# Patient Record
Sex: MALE | Race: WHITE | NOT HISPANIC OR LATINO | Employment: FULL TIME | URBAN - NONMETROPOLITAN AREA
[De-identification: names, ages, dates, MRNs, and addresses within clinical notes are randomized per-mention and may not be internally consistent; named-entity substitution may affect disease eponyms.]

---
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
## 2018-06-28 ENCOUNTER — HOSPITAL ENCOUNTER (EMERGENCY)
Facility: HOSPITAL | Age: 18
Discharge: HOME | End: 2018-06-29
Attending: INTERNAL MEDICINE | Admitting: INTERNAL MEDICINE
Payer: OTHER MISCELLANEOUS

## 2018-06-28 VITALS
DIASTOLIC BLOOD PRESSURE: 77 MMHG | TEMPERATURE: 98.9 F | SYSTOLIC BLOOD PRESSURE: 150 MMHG | OXYGEN SATURATION: 99 % | RESPIRATION RATE: 16 BRPM

## 2018-06-28 DIAGNOSIS — S81.811A LACERATION OF RIGHT LOWER EXTREMITY, INITIAL ENCOUNTER: ICD-10-CM

## 2018-06-28 PROCEDURE — 12001 RPR S/N/AX/GEN/TRNK 2.5CM/<: CPT

## 2018-06-28 PROCEDURE — 99282 EMERGENCY DEPT VISIT SF MDM: CPT | Mod: 25

## 2018-06-28 PROCEDURE — 12001 RPR S/N/AX/GEN/TRNK 2.5CM/<: CPT | Performed by: INTERNAL MEDICINE

## 2018-06-28 NOTE — ED AVS SNAPSHOT
[facility]    [address] [place] [address]    [place] [address]    Phone:  [phone]                                       Hema Rico   MRN: [id number]    Department:  [facility]   Date of Visit:  6/28/2018           Patient Information     Date Of Birth          2000        Your diagnoses for this visit were:     Laceration of right lower extremity, initial encounter        You were seen by Chauncey Lee MD.      Follow-up Information     Schedule an appointment as soon as possible for a visit with Pema Allison PA.    Specialty:  Family Practice    Contact information:    [address] [address]  [place] [address]  [phone]          Discharge Instructions          * LACERATION (All Closures)  A laceration is a cut through the skin. This will usually require stitches (sutures) or staples if it is deep. Minor cuts may be treated with a tape closure ( Steri-Strips ) or Dermabond skin glue.       HOME CARE:  PAIN MEDICINE: You may use acetaminophen (Tylenol) 650-1000 mg every 6 hours or ibuprofen (Motrin, Advil) 600 mg every 6-8 hours with food to control pain, if you are able to take these medicines. [NOTE: If you have chronic liver or kidney disease or ever had a stomach ulcer or GI bleeding, talk with your doctor before using these medicines.]  EXTREMITY, FACE or TRUNK WOUNDS:    Keep the wound clean and dry. If a bandage was applied and it becomes wet or dirty, replace it. Otherwise, leave it in place for the first 24 hours.    If stitches or staples were used, clean the wound daily. Protect the wound from sunlight and tanning lamps.    After removing the bandage, wash the area with soap and water. Use a wet cotton swab (Q tip) to loosen and remove any blood or crust that forms.    After cleaning, apply a thin layer of Polysporin or Bacitracin ointment. This will keep the wound clean and make it easier to remove the stitches or staples. Reapply a fresh bandage.    You  may remove the bandage to shower as usual after the first 24 hours, but do not soak the area in water (no swimming) until the stitches or staples are removed.    If Steri-Strips were used, keep the area clean and dry. If it becomes wet, blot it dry with a towel. It is okay to take a brief shower, but avoid scrubbing the area.    If Dermabond skin adhesive was used, do not scratch, rub or pick at the adhesive film. Do not place tape directly over the film. Do not apply liquid, ointment or creams to the wound while the film is in place. Do not clean the wound with peroxide and do not apply ointments. Avoid activities that cause heavy sweating until the film has fallen off. Protect the wound from prolonged exposure to sunlight or tanning lamps. You may shower as usual but do not soak the wound in water (no baths or swimming). The film will fall off by itself in 5-10 days.  SCALP WOUNDS: During the first two days, you may carefully rinse your hair in the shower to remove blood, glass or dirt particles. After two days, you may shower and shampoo your hair normally. Do not soak your scalp in the tub or go swimming until the stitches or staples have been removed.  MOUTH WOUNDS: Eat soft foods to reduce pain. If the cut is inside of your mouth, clean by rinsing after each meal and at bedtime with a mixture of equal parts water and Hydrogen Peroxide (do not swallow!). Or, you can use a cotton swab to directly apply Hydrogen Peroxide onto the cut.  After the wound is done healing, use sunscreen over the area whenever exposed for the next 6 minths to avoid a darker scar.     FOLLOW UP: Most skin wounds heal within ten days. Mouth and facial wounds heal within five days. However, even with proper treatment, a wound infection may sometimes occur. Therefore, you should check the wound daily for signs of infection listed below.  Stitches should be removed from the face within five days; stitches and staples should be removed from  other parts of the body within 7-10 days. Unless you are told to come back to the emergency room, you may have your doctor or urgent care remove the stitches. If dissolving stitches were used in the mouth, these will fall out or dissolve without the need for removal. If tape closures ( Steri-Strips ) were used, remove them yourself if they have not fallen off after 7 days. If Dermabond skin glue was used, the film will fall off by itself in 5-10 days.      GET PROMPT MEDICAL ATTENTION  if any of the following occur:    Increasing pain in the wound    Redness, swelling or pus coming from the wound    Fever over 101 F (38.3 C) oral    If stitches or staples come apart or fall out or if Steri-Strips fall off before seven days    If the wound edges re-open    Bleeding not controlled by direct pressure    2092-4993 [employer]. [address] [place] [address]. All rights reserved. This information is not intended as a substitute for professional medical care. Always follow your healthcare professional's instructions.  This information has been modified by your health care provider with permission from the publisher.         Review of your medicines      Notice     You have not been prescribed any medications.            Orders Needing Specimen Collection     None      Pending Results     No orders found for last 3 day(s).            Pending Culture Results     No orders found for last 3 day(s).            Thank you for choosing [facility]       Thank you for choosing [facility] for your care. Our goal is always to provide you with excellent care. Hearing back from our patients is one way we can continue to improve our services. Please take a few minutes to complete the written survey that you may receive in the mail after you visit with us. Thank you!        [employer] Information     [employer] lets you send messages to your doctor, view your test results, renew your prescriptions, schedule appointments  "and more. To sign up, go to [email or web address] . Click on \"Log in\" on the left side of the screen, which will take you to the Welcome page. Then click on \"Sign up Now\" on the right side of the page.     You will be asked to enter the access code listed below, as well as some personal information. Please follow the directions to create your username and password.     Your access code is: [email or web address]  Expires: 2018 12:29 AM     Your access code will  in 90 days. If you need help or a new code, please call your [facility] clinic or [phone].        Care EveryWhere ID     This is your Care EveryWhere ID. This could be used by other organizations to access your [facility] medical records  [id number]        Equal Access to Services     KYLE GÓMEZ : Makayla Greene, ursula holliday, olga diane, alberta story. So [facility] [phone].    ATENCIÓN: Si habla español, tiene a lindsey disposición servicios gratuitos de asistencia lingüística. Llame al [phone].    We comply with applicable federal civil rights laws and [place] laws. We do not discriminate on the basis of race, color, national origin, age, disability, sex, sexual orientation, or gender identity.            After Visit Summary       This is your record. Keep this with you and show to your community pharmacist(s) and doctor(s) at your next visit.                  "

## 2018-06-28 NOTE — ED AVS SNAPSHOT
[facility]    [address]    [place] [address]    Phone:  [phone]                                       Hema Rico   MRN: [id number]    Department:  [facility]   Date of Visit:  6/28/2018           After Visit Summary Signature Page     I have received my discharge instructions, and my questions have been answered. I have discussed any challenges I see with this plan with the nurse or doctor.    ..........................................................................................................................................  Patient/Patient Representative Signature      ..........................................................................................................................................  Patient Representative Print Name and Relationship to Patient    ..................................................               ................................................  Date                                            Time    ..........................................................................................................................................  Reviewed by Signature/Title    ...................................................              ..............................................  Date                                                            Time

## 2018-06-29 ASSESSMENT — ENCOUNTER SYMPTOMS
ABDOMINAL PAIN: 0
FOCAL WEAKNESS: 0
SHORTNESS OF BREATH: 0
EXTREMITY NUMBNESS: 0
FEVER: 0

## 2018-06-29 NOTE — ED NOTES
Ambulated to room 3 independently, call light in reach.  Around 2330 was cutting a zip tie with a razor blade cutter at work.  It slipped and cut right thigh.  2.5 cm laceration right anterior medial thigh, bleeding controlled.  Wound flushed with NS.  Denies pain at this time.   Tdap - 8/11/11.

## 2018-06-29 NOTE — DISCHARGE INSTRUCTIONS
* LACERATION (All Closures)  A laceration is a cut through the skin. This will usually require stitches (sutures) or staples if it is deep. Minor cuts may be treated with a tape closure ( Steri-Strips ) or Dermabond skin glue.       HOME CARE:  PAIN MEDICINE: You may use acetaminophen (Tylenol) 650-1000 mg every 6 hours or ibuprofen (Motrin, Advil) 600 mg every 6-8 hours with food to control pain, if you are able to take these medicines. [NOTE: If you have chronic liver or kidney disease or ever had a stomach ulcer or GI bleeding, talk with your doctor before using these medicines.]  EXTREMITY, FACE or TRUNK WOUNDS:    Keep the wound clean and dry. If a bandage was applied and it becomes wet or dirty, replace it. Otherwise, leave it in place for the first 24 hours.    If stitches or staples were used, clean the wound daily. Protect the wound from sunlight and tanning lamps.    After removing the bandage, wash the area with soap and water. Use a wet cotton swab (Q tip) to loosen and remove any blood or crust that forms.    After cleaning, apply a thin layer of Polysporin or Bacitracin ointment. This will keep the wound clean and make it easier to remove the stitches or staples. Reapply a fresh bandage.    You may remove the bandage to shower as usual after the first 24 hours, but do not soak the area in water (no swimming) until the stitches or staples are removed.    If Steri-Strips were used, keep the area clean and dry. If it becomes wet, blot it dry with a towel. It is okay to take a brief shower, but avoid scrubbing the area.    If Dermabond skin adhesive was used, do not scratch, rub or pick at the adhesive film. Do not place tape directly over the film. Do not apply liquid, ointment or creams to the wound while the film is in place. Do not clean the wound with peroxide and do not apply ointments. Avoid activities that cause heavy sweating until the film has fallen off. Protect the wound from prolonged  exposure to sunlight or tanning lamps. You may shower as usual but do not soak the wound in water (no baths or swimming). The film will fall off by itself in 5-10 days.  SCALP WOUNDS: During the first two days, you may carefully rinse your hair in the shower to remove blood, glass or dirt particles. After two days, you may shower and shampoo your hair normally. Do not soak your scalp in the tub or go swimming until the stitches or staples have been removed.  MOUTH WOUNDS: Eat soft foods to reduce pain. If the cut is inside of your mouth, clean by rinsing after each meal and at bedtime with a mixture of equal parts water and Hydrogen Peroxide (do not swallow!). Or, you can use a cotton swab to directly apply Hydrogen Peroxide onto the cut.  After the wound is done healing, use sunscreen over the area whenever exposed for the next 6 minths to avoid a darker scar.     FOLLOW UP: Most skin wounds heal within ten days. Mouth and facial wounds heal within five days. However, even with proper treatment, a wound infection may sometimes occur. Therefore, you should check the wound daily for signs of infection listed below.  Stitches should be removed from the face within five days; stitches and staples should be removed from other parts of the body within 7-10 days. Unless you are told to come back to the emergency room, you may have your doctor or urgent care remove the stitches. If dissolving stitches were used in the mouth, these will fall out or dissolve without the need for removal. If tape closures ( Steri-Strips ) were used, remove them yourself if they have not fallen off after 7 days. If Dermabond skin glue was used, the film will fall off by itself in 5-10 days.      GET PROMPT MEDICAL ATTENTION  if any of the following occur:    Increasing pain in the wound    Redness, swelling or pus coming from the wound    Fever over 101 F (38.3 C) oral    If stitches or staples come apart or fall out or if Steri-Strips fall  off before seven days    If the wound edges re-open    Bleeding not controlled by direct pressure    2851-9086 [employer]. [address] [place] [address], [place] [address]. All rights reserved. This information is not intended as a substitute for professional medical care. Always follow your healthcare professional's instructions.  This information has been modified by your health care provider with permission from the publisher.

## 2018-06-30 NOTE — ED PROVIDER NOTES
History     Chief Complaint   Patient presents with     Laceration     about 2330 was cutting a zip tie with a razor blade cutter, it slipped and cut right thigh, bleeding controlled     Patient is a 18 year old male presenting with skin laceration. The history is provided by the patient.   Laceration   Location:  Leg  Leg laceration location:  R leg  Depth:  Through dermis  Laceration mechanism:  Razor  Pain details:     Severity:  Mild  Foreign body present:  No foreign bodies  Tetanus status:  Up to date  Associated symptoms: no fever, no focal weakness, no numbness and no swelling          Problem List:    Patient Active Problem List    Diagnosis Date Noted     NO ACTIVE PROBLEMS 03/24/2016     Priority: Medium        Past Medical History:    History reviewed. No pertinent past medical history.    Past Surgical History:    History reviewed. No pertinent surgical history.    Family History:    Family History   Problem Relation Age of Onset     Asthma Mother      Psychotic Disorder Father      panic attacks     Hypertension Father      Family History Negative Maternal Grandmother      Family History Negative Brother      Hypertension Maternal Grandfather      C.A.D. Maternal Grandfather      Family History Negative Paternal Grandmother      Family History Negative Paternal Grandfather      GASTROINTESTINAL DISEASE Maternal Aunt      crohns     HEART DISEASE Other      family history Afib     HEART DISEASE Other      family history mitral valve     Thyroid Disease No family hx of        Social History:  Marital Status:  Single [1]  Social History   Substance Use Topics     Smoking status: Current Every Day Smoker     Types: Dip, chew, snus or snuff     Smokeless tobacco: Never Used      Comment: e cigarettes     Alcohol use No        Medications:      No current outpatient prescriptions on file.      Review of Systems   Constitutional: Negative for fever.   Respiratory: Negative for shortness of breath.     Cardiovascular: Negative for chest pain.   Gastrointestinal: Negative for abdominal pain.   Neurological: Negative for focal weakness.   All other systems reviewed and are negative.      Physical Exam   BP: 150/77  Heart Rate: 79  Temp: 98.9  F (37.2  C)  Resp: 16  SpO2: 99 %      Physical Exam   Constitutional: No distress.   HENT:   Head: Atraumatic.   Mouth/Throat: Oropharynx is clear and moist. No oropharyngeal exudate.   Eyes: Pupils are equal, round, and reactive to light. No scleral icterus.   Cardiovascular: Normal heart sounds and intact distal pulses.    Pulmonary/Chest: Breath sounds normal. No respiratory distress.   Abdominal: Soft. Bowel sounds are normal. There is no tenderness.   Musculoskeletal: He exhibits no edema or tenderness.   Skin: Skin is warm. No rash noted. He is not diaphoretic.        About 20 x 5 mm laceration on anteromedial of mid thigh       ED Course     ED Course     Laceration repair  Date/Time: 6/29/2018 8:41 PM  Performed by: EMERSON MORRISSEY  Authorized by: EMERSON MORRISSEY   Consent: Verbal consent obtained.  Risks and benefits: risks, benefits and alternatives were discussed  Consent given by: patient  Patient understanding: patient states understanding of the procedure being performed  Patient identity confirmed: verbally with patient  Body area: lower extremity  Location details: right upper leg  Laceration length: 2 cm  Foreign bodies: no foreign bodies  Tendon involvement: none  Nerve involvement: none  Anesthesia: local infiltration    Anesthesia:  Local Anesthetic: lidocaine 2% without epinephrine  Anesthetic total: 2 mL  Irrigation solution: saline  Irrigation method: syringe  Amount of cleaning: standard  Skin closure: 3-0 nylon  Technique: simple  Approximation: close  Approximation difficulty: simple  Patient tolerance: Patient tolerated the procedure well with no immediate complications                         No results found for this or any previous visit (from the past  24 hour(s)).    Medications - No data to display    Assessments & Plan (with Medical Decision Making)   R upper leg laceration with razor   Repaired  Dc home, fu with PCP  I have reviewed the nursing notes.    I have reviewed the findings, diagnosis, plan and need for follow up with the patient.      There are no discharge medications for this patient.      Final diagnoses:   Laceration of right lower extremity, initial encounter       6/28/2018   [facility]     Chauncey Lee MD  06/29/18 9964

## 2018-07-31 NOTE — PROGRESS NOTES
"  SUBJECTIVE:   Hema Rico is a 18 year old male who presents to clinic today for the following health issues:    Abnormal Mood Symptoms      Duration: 1 year - has  Gotten drastically worse    Description:  Depression: no   Anxiety: YES  Panic attacks: YES on Saturday, has had a headache/migraine since saturday    Accompanying signs and symptoms: see PHQ-9 and JENAE scores    History (similar episodes/previous evaluation): None    Precipitating or alleviating factors: None    Therapies tried and outcome: none    Pt is a 17 yo M who presents to clinic with anxiety symptoms. States he has been struggling with anxiety for the last year or so. He has difficulty falling asleep because he mind is racing. He also notes his mind seems scattered. It is hard for him to focus. He is forgetful and irritable. He had what he feels is a panic attack several days ago around 2 AM. Felt overwhelmed, palpitations, short of breath, sweating. He was able to get up and walk it off. Lasted about 30 minutes. No weight loss. No change in appetite. Is not taking any drugs. Is working on quitting tobacco. He denies etoh or other drug use.     Of note, patient was worked up from a cardiac standpoint in 2014 for palpitations. Normal echo and normal holter monitor.     Problem list and histories reviewed & adjusted, as indicated.  Additional history: as documented    Labs reviewed in EPIC    Reviewed and updated as needed this visit by clinical staff  Tobacco  Allergies  Meds  Problems  Med Hx  Surg Hx  Fam Hx  Soc Hx        Reviewed and updated as needed this visit by Provider  Tobacco  Med Hx  Surg Hx  Fam Hx  Soc Hx        ROS:  Constitutional, HEENT, cardiovascular, pulmonary, gi and gu systems are negative, except as otherwise noted.    OBJECTIVE:     /78  Pulse 66  Temp 98.6  F (37  C) (Tympanic)  Resp 16  Ht 6' 3.5\" (1.918 m)  Wt 200 lb (90.7 kg)  SpO2 98%  BMI 24.67 kg/m2  Body mass index is 24.67 " kg/(m^2).  GENERAL: healthy, alert and no distress  NECK: no adenopathy, no asymmetry, masses, or scars and thyroid normal to palpation  RESP: lungs clear to auscultation - no rales, rhonchi or wheezes  CV: regular rate and rhythm, normal S1 S2, no S3 or S4, no murmur, click or rub, no peripheral edema and peripheral pulses strong  ABDOMEN: soft, nontender, no hepatosplenomegaly, no masses and bowel sounds normal  MS: no gross musculoskeletal defects noted, no edema    Diagnostic Test Results:  Results for orders placed or performed in visit on 08/01/18   CBC with platelets and differential   Result Value Ref Range    WBC 6.9 4.0 - 11.0 10e9/L    RBC Count 4.88 4.4 - 5.9 10e12/L    Hemoglobin 15.0 13.3 - 17.7 g/dL    Hematocrit 42.3 40.0 - 53.0 %    MCV 87 78 - 100 fl    MCH 30.7 26.5 - 33.0 pg    MCHC 35.5 31.5 - 36.5 g/dL    RDW 12.1 10.0 - 15.0 %    Platelet Count 191 150 - 450 10e9/L    Diff Method Automated Method     % Neutrophils 57.2 %    % Lymphocytes 32.5 %    % Monocytes 6.2 %    % Eosinophils 3.3 %    % Basophils 0.7 %    % Immature Granulocytes 0.1 %    Nucleated RBCs 0 0 /100    Absolute Neutrophil 4.0 1.6 - 8.3 10e9/L    Absolute Lymphocytes 2.3 0.8 - 5.3 10e9/L    Absolute Monocytes 0.4 0.0 - 1.3 10e9/L    Absolute Eosinophils 0.2 0.0 - 0.7 10e9/L    Absolute Basophils 0.1 0.0 - 0.2 10e9/L    Abs Immature Granulocytes 0.0 0 - 0.4 10e9/L    Absolute Nucleated RBC 0.0    Comprehensive metabolic panel (BMP + Alb, Alk Phos, ALT, AST, Total. Bili, TP)   Result Value Ref Range    Sodium 140 133 - 144 mmol/L    Potassium 3.7 3.4 - 5.3 mmol/L    Chloride 106 94 - 109 mmol/L    Carbon Dioxide 28 20 - 32 mmol/L    Anion Gap 6 3 - 14 mmol/L    Glucose 89 70 - 99 mg/dL    Urea Nitrogen 12 7 - 30 mg/dL    Creatinine 0.63 (L) 0.66 - 1.25 mg/dL    GFR Estimate >90 >60 mL/min/1.7m2    GFR Estimate If Black >90 >60 mL/min/1.7m2    Calcium 8.4 (L) 8.5 - 10.1 mg/dL    Bilirubin Total 1.2 0.2 - 1.3 mg/dL    Albumin 4.5  3.4 - 5.0 g/dL    Protein Total 7.6 6.8 - 8.8 g/dL    Alkaline Phosphatase 108 40 - 150 U/L    ALT 32 0 - 70 U/L    AST 16 0 - 45 U/L   TSH with free T4 reflex   Result Value Ref Range    TSH 2.59 0.40 - 4.00 mU/L       ASSESSMENT/PLAN:     JENAE (generalized anxiety disorder)   Discussed options with patient. He would like to start medication. Is ambivalent about what to start. Discussed SSRIs vs SNRI like wellbutrin as he is currently using tobacco. He believes his mom or brother may have been on this medication. Start at one tab a day. Follow up 1 month. If side effects of medication, he should call rather than wait for appt and we can start something else if desired over the phone.   - CBC with platelets and differential  - Comprehensive metabolic panel (BMP + Alb, Alk Phos, ALT, AST, Total. Bili, TP)  - TSH with free T4 reflex  - Vitamin D Deficiency  - buPROPion (WELLBUTRIN XL) 150 MG 24 hr tablet; Take 1 tablet (150 mg) by mouth every morning  Dispense: 30 tablet; Refill: 1    Sushila Anderson MD  [facility]

## 2018-08-01 ENCOUNTER — TELEPHONE (OUTPATIENT)
Dept: FAMILY MEDICINE | Facility: OTHER | Age: 18
End: 2018-08-01

## 2018-08-01 ENCOUNTER — OFFICE VISIT (OUTPATIENT)
Dept: FAMILY MEDICINE | Facility: OTHER | Age: 18
End: 2018-08-01
Attending: FAMILY MEDICINE
Payer: COMMERCIAL

## 2018-08-01 VITALS
OXYGEN SATURATION: 98 % | HEART RATE: 66 BPM | DIASTOLIC BLOOD PRESSURE: 78 MMHG | BODY MASS INDEX: 24.36 KG/M2 | SYSTOLIC BLOOD PRESSURE: 128 MMHG | HEIGHT: 76 IN | TEMPERATURE: 98.6 F | RESPIRATION RATE: 16 BRPM | WEIGHT: 200 LBS

## 2018-08-01 DIAGNOSIS — Z72.0 TOBACCO ABUSE: ICD-10-CM

## 2018-08-01 DIAGNOSIS — F41.1 GAD (GENERALIZED ANXIETY DISORDER): Primary | ICD-10-CM

## 2018-08-01 LAB
ALBUMIN SERPL-MCNC: 4.5 G/DL (ref 3.4–5)
ALP SERPL-CCNC: 108 U/L (ref 40–150)
ALT SERPL W P-5'-P-CCNC: 32 U/L (ref 0–70)
ANION GAP SERPL CALCULATED.3IONS-SCNC: 6 MMOL/L (ref 3–14)
AST SERPL W P-5'-P-CCNC: 16 U/L (ref 0–45)
BASOPHILS # BLD AUTO: 0.1 10E9/L (ref 0–0.2)
BASOPHILS NFR BLD AUTO: 0.7 %
BILIRUB SERPL-MCNC: 1.2 MG/DL (ref 0.2–1.3)
BUN SERPL-MCNC: 12 MG/DL (ref 7–30)
CALCIUM SERPL-MCNC: 8.4 MG/DL (ref 8.5–10.1)
CHLORIDE SERPL-SCNC: 106 MMOL/L (ref 94–109)
CO2 SERPL-SCNC: 28 MMOL/L (ref 20–32)
CREAT SERPL-MCNC: 0.63 MG/DL (ref 0.66–1.25)
DIFFERENTIAL METHOD BLD: NORMAL
EOSINOPHIL # BLD AUTO: 0.2 10E9/L (ref 0–0.7)
EOSINOPHIL NFR BLD AUTO: 3.3 %
ERYTHROCYTE [DISTWIDTH] IN BLOOD BY AUTOMATED COUNT: 12.1 % (ref 10–15)
GFR SERPL CREATININE-BSD FRML MDRD: >90 ML/MIN/1.7M2
GLUCOSE SERPL-MCNC: 89 MG/DL (ref 70–99)
HCT VFR BLD AUTO: 42.3 % (ref 40–53)
HGB BLD-MCNC: 15 G/DL (ref 13.3–17.7)
IMM GRANULOCYTES # BLD: 0 10E9/L (ref 0–0.4)
IMM GRANULOCYTES NFR BLD: 0.1 %
LYMPHOCYTES # BLD AUTO: 2.3 10E9/L (ref 0.8–5.3)
LYMPHOCYTES NFR BLD AUTO: 32.5 %
MCH RBC QN AUTO: 30.7 PG (ref 26.5–33)
MCHC RBC AUTO-ENTMCNC: 35.5 G/DL (ref 31.5–36.5)
MCV RBC AUTO: 87 FL (ref 78–100)
MONOCYTES # BLD AUTO: 0.4 10E9/L (ref 0–1.3)
MONOCYTES NFR BLD AUTO: 6.2 %
NEUTROPHILS # BLD AUTO: 4 10E9/L (ref 1.6–8.3)
NEUTROPHILS NFR BLD AUTO: 57.2 %
NRBC # BLD AUTO: 0 10*3/UL
NRBC BLD AUTO-RTO: 0 /100
PLATELET # BLD AUTO: 191 10E9/L (ref 150–450)
POTASSIUM SERPL-SCNC: 3.7 MMOL/L (ref 3.4–5.3)
PROT SERPL-MCNC: 7.6 G/DL (ref 6.8–8.8)
RBC # BLD AUTO: 4.88 10E12/L (ref 4.4–5.9)
SODIUM SERPL-SCNC: 140 MMOL/L (ref 133–144)
TSH SERPL DL<=0.005 MIU/L-ACNC: 2.59 MU/L (ref 0.4–4)
WBC # BLD AUTO: 6.9 10E9/L (ref 4–11)

## 2018-08-01 PROCEDURE — 99213 OFFICE O/P EST LOW 20 MIN: CPT | Performed by: FAMILY MEDICINE

## 2018-08-01 PROCEDURE — 82306 VITAMIN D 25 HYDROXY: CPT | Mod: 90 | Performed by: FAMILY MEDICINE

## 2018-08-01 PROCEDURE — 36415 COLL VENOUS BLD VENIPUNCTURE: CPT | Performed by: FAMILY MEDICINE

## 2018-08-01 PROCEDURE — 99000 SPECIMEN HANDLING OFFICE-LAB: CPT | Performed by: FAMILY MEDICINE

## 2018-08-01 PROCEDURE — 80050 GENERAL HEALTH PANEL: CPT | Performed by: FAMILY MEDICINE

## 2018-08-01 RX ORDER — BUPROPION HYDROCHLORIDE 150 MG/1
150 TABLET ORAL EVERY MORNING
Qty: 30 TABLET | Refills: 1 | Status: SHIPPED | OUTPATIENT
Start: 2018-08-01 | End: 2018-11-01

## 2018-08-01 ASSESSMENT — ANXIETY QUESTIONNAIRES
2. NOT BEING ABLE TO STOP OR CONTROL WORRYING: NEARLY EVERY DAY
3. WORRYING TOO MUCH ABOUT DIFFERENT THINGS: NEARLY EVERY DAY
6. BECOMING EASILY ANNOYED OR IRRITABLE: NOT AT ALL
7. FEELING AFRAID AS IF SOMETHING AWFUL MIGHT HAPPEN: MORE THAN HALF THE DAYS
IF YOU CHECKED OFF ANY PROBLEMS ON THIS QUESTIONNAIRE, HOW DIFFICULT HAVE THESE PROBLEMS MADE IT FOR YOU TO DO YOUR WORK, TAKE CARE OF THINGS AT HOME, OR GET ALONG WITH OTHER PEOPLE: SOMEWHAT DIFFICULT
1. FEELING NERVOUS, ANXIOUS, OR ON EDGE: MORE THAN HALF THE DAYS
GAD7 TOTAL SCORE: 12
4. TROUBLE RELAXING: MORE THAN HALF THE DAYS
5. BEING SO RESTLESS THAT IT IS HARD TO SIT STILL: NOT AT ALL

## 2018-08-01 ASSESSMENT — PAIN SCALES - GENERAL: PAINLEVEL: NO PAIN (0)

## 2018-08-01 NOTE — TELEPHONE ENCOUNTER
To: Nurse of Dr. Anderson  Please call mom of patient back to discuss the medication you put him on today.  She does not agree with the medication and would like to speak with the doctor.  Her phone # [phone].  Thank you

## 2018-08-01 NOTE — NURSING NOTE
"Chief Complaint   Patient presents with     Anxiety       Initial /78  Pulse 66  Temp 98.6  F (37  C) (Tympanic)  Resp 16  Ht 6' 3.5\" (1.918 m)  Wt 200 lb (90.7 kg)  SpO2 98%  BMI 24.67 kg/m2 Estimated body mass index is 24.67 kg/(m^2) as calculated from the following:    Height as of this encounter: 6' 3.5\" (1.918 m).    Weight as of this encounter: 200 lb (90.7 kg).  Medication Reconciliation: complete    Sabiha Peña LPN  "

## 2018-08-01 NOTE — MR AVS SNAPSHOT
"              After Visit Summary   2018    Hema Rico    MRN: [id number]           Patient Information     Date Of Birth          2000        Visit Information        Provider Department      2018 11:30 AM Sushila Anderson MD [facility]        Today's Diagnoses     JENAE (generalized anxiety disorder)    -  1    Tobacco abuse           Follow-ups after your visit        Who to contact     If you have questions or need follow up information about today's clinic visit or your schedule please contact [facility] directly at [phone].  Normal or non-critical lab and imaging results will be communicated to you by Ravel Lawhart, letter or phone within 4 business days after the clinic has received the results. If you do not hear from us within 7 days, please contact the clinic through Ravel Lawhart or phone. If you have a critical or abnormal lab result, we will notify you by phone as soon as possible.  Submit refill requests through [employer] or call your pharmacy and they will forward the refill request to us. Please allow 3 business days for your refill to be completed.          Additional Information About Your Visit        MyChart Information     [employer] lets you send messages to your doctor, view your test results, renew your prescriptions, schedule appointments and more. To sign up, go to [email or web address] . Click on \"Log in\" on the left side of the screen, which will take you to the Welcome page. Then click on \"Sign up Now\" on the right side of the page.     You will be asked to enter the access code listed below, as well as some personal information. Please follow the directions to create your username and password.     Your access code is: [email or web address]  Expires: 2018 12:29 AM     Your access code will  in 90 days. If you need help or a new code, please call your [facility] or [phone].        Care EveryWhere ID     This is your Care EveryWhere ID. This " "could be used by other organizations to access your [facility] medical records  [id number]        Your Vitals Were     Pulse Temperature Respirations Height Pulse Oximetry BMI (Body Mass Index)    66 98.6  F (37  C) (Tympanic) 16 6' 3.5\" (1.918 m) 98% 24.67 kg/m2       Blood Pressure from Last 3 Encounters:   08/01/18 128/78   06/28/18 150/77   04/14/17 122/66    Weight from Last 3 Encounters:   08/01/18 200 lb (90.7 kg)   04/14/17 194 lb (88 kg) (91 %)*   03/24/16 180 lb (81.6 kg) (86 %)*     * Growth percentiles are based on CDC 2-20 Years data.              We Performed the Following     CBC with platelets and differential     Comprehensive metabolic panel (BMP + Alb, Alk Phos, ALT, AST, Total. Bili, TP)     TSH with free T4 reflex     Vitamin D Deficiency          Today's Medication Changes          These changes are accurate as of 8/1/18 11:59 PM.  If you have any questions, ask your nurse or doctor.               Start taking these medicines.        Dose/Directions    buPROPion 150 MG 24 hr tablet   Commonly known as:  WELLBUTRIN XL   Used for:  JENAE (generalized anxiety disorder), Tobacco abuse   Started by:  Sushila Anderson MD        Dose:  150 mg   Take 1 tablet (150 mg) by mouth every morning   Quantity:  30 tablet   Refills:  1            Where to get your medicines      These medications were sent to [id number] - [place], [place] - [address] AT [facility] [address], [facility] [address]     Phone:  [phone]     buPROPion 150 MG 24 hr tablet                Primary Care Provider Office Phone # Fax #    FIDENCIO Reyes [phone]       [address] [place] [address]        Equal Access to Services     KYLE GÓMEZ AH: Makayla phillips [place], waaxda luqadaha, qaybta kaalmada adequiqueyacarolin, alberta story. So [facility] [phone].    ATENCIÓN: Si habla español, tiene a lindsey disposición servicios gratuitos de " asistencia lingüística. Edwina al [phone].    We comply with applicable federal civil rights laws and [place] laws. We do not discriminate on the basis of race, color, national origin, age, disability, sex, sexual orientation, or gender identity.            Thank you!     Thank you for choosing [facility]  for your care. Our goal is always to provide you with excellent care. Hearing back from our patients is one way we can continue to improve our services. Please take a few minutes to complete the written survey that you may receive in the mail after your visit with us. Thank you!             Your Updated Medication List - Protect others around you: Learn how to safely use, store and throw away your medicines at www.disposemymeds.org.          This list is accurate as of 8/1/18 11:59 PM.  Always use your most recent med list.                   Brand Name Dispense Instructions for use Diagnosis    buPROPion 150 MG 24 hr tablet    WELLBUTRIN XL    30 tablet    Take 1 tablet (150 mg) by mouth every morning    JENAE (generalized anxiety disorder), Tobacco abuse

## 2018-08-02 ASSESSMENT — PATIENT HEALTH QUESTIONNAIRE - PHQ9: SUM OF ALL RESPONSES TO PHQ QUESTIONS 1-9: 8

## 2018-08-02 ASSESSMENT — ANXIETY QUESTIONNAIRES: GAD7 TOTAL SCORE: 12

## 2018-08-03 LAB — DEPRECATED CALCIDIOL+CALCIFEROL SERPL-MC: 26 UG/L (ref 20–75)

## 2018-10-09 ENCOUNTER — TELEPHONE (OUTPATIENT)
Dept: FAMILY MEDICINE | Facility: OTHER | Age: 18
End: 2018-10-09

## 2018-10-09 NOTE — TELEPHONE ENCOUNTER
1:06 PM    Reason for Call: OVERBOOK    Patient is having the following symptoms: chronic headaches for 2 weeks.    The patient is requesting an appointment for 10/15/2018 with Pema Allison[place] he has home.    Was an appointment offered for this call? No  If yes : Appointment type              Date    Preferred method for responding to this message: Telephone Call  What is your phone number ? [phone]  If we cannot reach you directly, may we leave a detailed response at the number you provided? Yes    Can this message wait until your PCP/provider returns, if unavailable today? Not applicable, provider is in today.     thank you,     Judith Patricia

## 2018-11-01 ENCOUNTER — OFFICE VISIT (OUTPATIENT)
Dept: PEDIATRICS | Facility: OTHER | Age: 18
End: 2018-11-01
Attending: NURSE PRACTITIONER
Payer: COMMERCIAL

## 2018-11-01 VITALS
HEIGHT: 75 IN | SYSTOLIC BLOOD PRESSURE: 128 MMHG | TEMPERATURE: 97.2 F | OXYGEN SATURATION: 98 % | RESPIRATION RATE: 18 BRPM | BODY MASS INDEX: 23.62 KG/M2 | WEIGHT: 190 LBS | DIASTOLIC BLOOD PRESSURE: 74 MMHG | HEART RATE: 70 BPM

## 2018-11-01 DIAGNOSIS — G44.229 CHRONIC TENSION-TYPE HEADACHE, NOT INTRACTABLE: Primary | ICD-10-CM

## 2018-11-01 PROCEDURE — 99213 OFFICE O/P EST LOW 20 MIN: CPT | Performed by: NURSE PRACTITIONER

## 2018-11-01 ASSESSMENT — ANXIETY QUESTIONNAIRES
IF YOU CHECKED OFF ANY PROBLEMS ON THIS QUESTIONNAIRE, HOW DIFFICULT HAVE THESE PROBLEMS MADE IT FOR YOU TO DO YOUR WORK, TAKE CARE OF THINGS AT HOME, OR GET ALONG WITH OTHER PEOPLE: NOT DIFFICULT AT ALL
5. BEING SO RESTLESS THAT IT IS HARD TO SIT STILL: NOT AT ALL
6. BECOMING EASILY ANNOYED OR IRRITABLE: NOT AT ALL
3. WORRYING TOO MUCH ABOUT DIFFERENT THINGS: MORE THAN HALF THE DAYS
7. FEELING AFRAID AS IF SOMETHING AWFUL MIGHT HAPPEN: SEVERAL DAYS
1. FEELING NERVOUS, ANXIOUS, OR ON EDGE: MORE THAN HALF THE DAYS
2. NOT BEING ABLE TO STOP OR CONTROL WORRYING: SEVERAL DAYS
4. TROUBLE RELAXING: MORE THAN HALF THE DAYS
GAD7 TOTAL SCORE: 8

## 2018-11-01 ASSESSMENT — PATIENT HEALTH QUESTIONNAIRE - PHQ9: SUM OF ALL RESPONSES TO PHQ QUESTIONS 1-9: 3

## 2018-11-01 ASSESSMENT — PAIN SCALES - GENERAL: PAINLEVEL: MILD PAIN (2)

## 2018-11-01 NOTE — PATIENT INSTRUCTIONS
May try ice alternating with heat to the back of the neck.  May try massage.   Peppermint and/or lavender essential oils may help with pain and sleep.  May continue ibuprofen 800 mg every 8 hours as needed.  Continue chiropractic, as it is helping.  Consider trying yoga, especially yoga geared to headache relief. There are many free You Tube videos to do at home.  May try guided meditation, especially at bedtime.

## 2018-11-01 NOTE — PROGRESS NOTES
SUBJECTIVE:   Hema Rico is a 18 year old male who presents to clinic today for the following health issues:      Headaches      Duration: 3 months. Started after a panic attack.    Description  Location: bilateral from the temporal area around to back of head and down neck  Character: throbbing pain, dull pain  Frequency:  All day, daily with only a few days without a headache  Duration:  All day but worse in the morning after waking up    Intensity:  moderate    Accompanying signs and symptoms:    Precipitating or Alleviating factors:  Nausea/vomiting: no  Dizziness: no  Weakness or numbness: no  Visual changes: none  Difficulty with light (emitting from video game), but no photophobia or phonophobia  Fever: no   Sinus or URI symptoms no     History  Head trauma: no   Family history of migraines: YES- mother  Previous tests for headaches: no   Neurologist evaluations: no  Able to do daily activities when headache present: YES  Wake with headaches: YES  Daily pain medication use: no, sometimes will take Ibuprofen if it gets too bad  Any life changes: Girlfriend moving to [place] and some changes with his job (possible transfer)    Precipitating or Alleviating factors (light/sound/sleep/caffeine): quiet, sitting in a dark room helps. Has tried peppermint essential oils.    Therapies tried and outcome: Ibuprofen 800 mg (Advil, Motrin)    Outcome - not effective, only helps a little bit. Weekly visits to the chiropractor for neck adjustments relieve the headache for a few days.   Frequent/daily pain medication use: no    Has been having allergy symptoms of rhinorrhea, congestion chronically. He uses a humidifier at night. Takes Airborne for allergy, which he states helps a little. Has tried OTC allergy medications without improvement.    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 Was working night shifts for the past month, but headaches started prior. Goes to a chiropractor weekly,  "which helps a little bit.     Has a degree in IT, and spends a lot of time playing computer games.     Doesn't each much fruit or vegetables, \"I mostly get vegetables when I get a sub.\" Eats a lot of meat. Drinks a lot of water. Diet Coke once daily.    Was prescribed Wellbutrin for anxiety, but never filled the prescription, stating, \"My mom didn't want me to take it because of the side effect.\" No panic attacks since he had one about 3 months ago, although he states he is sometimes anxious. He has been taking NRF2 supplements given to him by his mom for anxiety. He doesn't know if they've been helping or not. He is not interested in counseling.    Sleep is \"okay.\" Headaches seem to start first thing in the morning, but do not wake him during the night.      Problem list and histories reviewed & adjusted, as indicated.  Additional history: as documented    Patient Active Problem List   Diagnosis     Episodic tension-type headache, not intractable     History reviewed. No pertinent surgical history.    Social History   Substance Use Topics     Smoking status: Former Smoker     Types: Dip, chew, snus or snuff     Smokeless tobacco: Never Used      Comment: e cigarettes     Alcohol use No     Family History   Problem Relation Age of Onset     Asthma Mother      Anxiety Disorder Mother      Psychotic Disorder Father      panic attacks     Hypertension Father      Family History Negative Maternal Grandmother      Family History Negative Brother      Anxiety Disorder Brother      panic attack, drug abuse     Hypertension Maternal Grandfather      C.A.D. Maternal Grandfather      Family History Negative Paternal Grandmother      Family History Negative Paternal Grandfather      GASTROINTESTINAL DISEASE Maternal Aunt      crohns     HEART DISEASE Other      family history Afib     HEART DISEASE Other      family history mitral valve     Thyroid Disease No family hx of          Current Outpatient Prescriptions   Medication " "Sig Dispense Refill     VITAMIN D, CHOLECALCIFEROL, PO Take by mouth daily       Allergies   Allergen Reactions     Sulfa Drugs        Reviewed and updated as needed this visit by clinical staff  Tobacco  Allergies  Meds  Problems  Med Hx  Surg Hx  Fam Hx  Soc Hx        Reviewed and updated as needed this visit by Provider  Tobacco  Allergies  Meds  Problems  Med Hx  Surg Hx  Fam Hx  Soc Hx          ROS:  Constitutional, HEENT, cardiovascular, pulmonary, gi and gu systems are negative, except as otherwise noted.    OBJECTIVE:     /74 (BP Location: Right arm, Patient Position: Sitting, Cuff Size: Adult Regular)  Pulse 70  Temp 97.2  F (36.2  C) (Tympanic)  Resp 18  Ht 6' 3\" (1.905 m)  Wt 190 lb (86.2 kg)  SpO2 98%  BMI 23.75 kg/m2  Body mass index is 23.75 kg/(m^2).  GENERAL: healthy, alert and no distress  EYES: Eyes grossly normal to inspection, PERRL and conjunctivae and sclerae normal  HENT: ear canals and TM's normal, nose and mouth without ulcers or lesions  NECK: no adenopathy, no asymmetry, masses, or scars and thyroid normal to palpation.   RESP: lungs clear to auscultation - no rales, rhonchi or wheezes  CV: regular rate and rhythm, normal S1 S2, no S3 or S4, no murmur, click or rub, no peripheral edema and peripheral pulses strong  ABDOMEN: soft, nontender, no hepatosplenomegaly, no masses and bowel sounds normal  MS: no gross musculoskeletal defects noted, no edema    Diagnostic Test Results:  none     ASSESSMENT/PLAN:     1. Chronic tension-type headache, not intractable  Description of headaches more consistent with tension-type headache than migraine. Hema is very open to alternative treatments for headaches - does not like to take medication. Advised to continue chiropractic treatment, massage, yoga, meditation, lavender essential oil, peppermint essential oil, ice/heat. Adequate sleep and exercise also are important. Self care measures should also help anxiety " symptoms.      See Patient Instructions  Follow up if not improving with self-care measures    ZOHRA Blair [facility]

## 2018-11-01 NOTE — NURSING NOTE
"Chief Complaint   Patient presents with     Headache       Initial /74 (BP Location: Right arm, Patient Position: Sitting, Cuff Size: Adult Regular)  Pulse 70  Temp 97.2  F (36.2  C) (Tympanic)  Resp 18  Ht 6' 3\" (1.905 m)  Wt 190 lb (86.2 kg)  SpO2 98%  BMI 23.75 kg/m2 Estimated body mass index is 23.75 kg/(m^2) as calculated from the following:    Height as of this encounter: 6' 3\" (1.905 m).    Weight as of this encounter: 190 lb (86.2 kg).  Medication Reconciliation: complete    Ashley A. Lechevalier, LPN  "

## 2018-11-01 NOTE — MR AVS SNAPSHOT
"              After Visit Summary   11/1/2018    Hema Rico    MRN: [id number]           Patient Information     Date Of Birth          2000        Visit Information        Provider Department      11/1/2018 2:15 PM Saida Herrera APRN CNP [facility]        Care Instructions    May try ice alternating with heat to the back of the neck.  May try massage.   Peppermint and/or lavender essential oils may help with pain and sleep.  May continue ibuprofen 800 mg every 8 hours as needed.  Continue chiropractic, as it is helping.  Consider trying yoga, especially yoga geared to headache relief. There are many free You Tube videos to do at home.  May try guided meditation, especially at bedtime.              Follow-ups after your visit        Follow-up notes from your care team     Return if symptoms worsen or fail to improve.      Who to contact     If you have questions or need follow up information about today's clinic visit or your schedule please contact [facility] directly at [phone].  Normal or non-critical lab and imaging results will be communicated to you by QVIVOhart, letter or phone within 4 business days after the clinic has received the results. If you do not hear from us within 7 days, please contact the clinic through [employer] or phone. If you have a critical or abnormal lab result, we will notify you by phone as soon as possible.  Submit refill requests through [employer] or call your pharmacy and they will forward the refill request to us. Please allow 3 business days for your refill to be completed.          Additional Information About Your Visit        [employer] Information     [employer] lets you send messages to your doctor, view your test results, renew your prescriptions, schedule appointments and more. To sign up, go to [email or web address] . Click on \"Log in\" on the left side of the screen, which will take you to the Welcome page. Then " "click on \"Sign up Now\" on the right side of the page.     You will be asked to enter the access code listed below, as well as some personal information. Please follow the directions to create your username and password.     Your access code is: [email or web address]  Expires: 2019 10:00 AM     Your access code will  in 90 days. If you need help or a new code, please call your [place] clinic or [phone].        Care EveryWhere ID     This is your Care EveryWhere ID. This could be used by other organizations to access your [place] medical records  [id number]        Your Vitals Were     Pulse Temperature Respirations Height Pulse Oximetry BMI (Body Mass Index)    70 97.2  F (36.2  C) (Tympanic) 18 6' 3\" (1.905 m) 98% 23.75 kg/m2       Blood Pressure from Last 3 Encounters:   18 128/74   18 128/78   18 150/77    Weight from Last 3 Encounters:   18 190 lb (86.2 kg)   18 200 lb (90.7 kg)   17 194 lb (88 kg) (91 %)*     * Growth percentiles are based on CDC 2-20 Years data.              Today, you had the following     No orders found for display       Primary Care Provider Office Phone # Fax #    PemaFIDENCIO Rodriguez [phone]       [address] [place] [address]        Equal Access to Services     [facility]: Hadii aad ku hadasho Soomaali, waaxda luqadaha, qaybta kaalmada adeegyada, alberta fuller . So [facility] [phone].    ATENCIÓN: Si habla español, tiene a lindsey disposición servicios gratuitos de asistencia lingüística. Llame al [phone].    We comply with applicable federal civil rights laws and [place] laws. We do not discriminate on the basis of race, color, national origin, age, disability, sex, sexual orientation, or gender identity.            Thank you!     Thank you for choosing [facility]  for your care. Our goal is always to provide you with excellent care. Hearing back from our " patients is one way we can continue to improve our services. Please take a few minutes to complete the written survey that you may receive in the mail after your visit with us. Thank you!             Your Updated Medication List - Protect others around you: Learn how to safely use, store and throw away your medicines at www.disposemymeds.org.          This list is accurate as of 11/1/18  3:06 PM.  Always use your most recent med list.                   Brand Name Dispense Instructions for use Diagnosis    VITAMIN D (CHOLECALCIFEROL) PO      Take by mouth daily

## 2018-11-02 PROBLEM — G44.219 EPISODIC TENSION-TYPE HEADACHE, NOT INTRACTABLE: Status: ACTIVE | Noted: 2018-11-02

## 2018-11-02 ASSESSMENT — ANXIETY QUESTIONNAIRES: GAD7 TOTAL SCORE: 8

## 2019-03-04 ENCOUNTER — OFFICE VISIT (OUTPATIENT)
Dept: FAMILY MEDICINE | Facility: OTHER | Age: 19
End: 2019-03-04
Attending: FAMILY MEDICINE
Payer: COMMERCIAL

## 2019-03-04 VITALS
SYSTOLIC BLOOD PRESSURE: 132 MMHG | OXYGEN SATURATION: 98 % | WEIGHT: 202 LBS | BODY MASS INDEX: 24.6 KG/M2 | HEART RATE: 82 BPM | HEIGHT: 76 IN | TEMPERATURE: 97.7 F | DIASTOLIC BLOOD PRESSURE: 60 MMHG

## 2019-03-04 DIAGNOSIS — G44.229 CHRONIC TENSION-TYPE HEADACHE, NOT INTRACTABLE: Primary | ICD-10-CM

## 2019-03-04 DIAGNOSIS — J32.9 CHRONIC CONGESTION OF PARANASAL SINUS: ICD-10-CM

## 2019-03-04 DIAGNOSIS — F41.1 GAD (GENERALIZED ANXIETY DISORDER): ICD-10-CM

## 2019-03-04 PROCEDURE — 99214 OFFICE O/P EST MOD 30 MIN: CPT | Performed by: FAMILY MEDICINE

## 2019-03-04 ASSESSMENT — PAIN SCALES - GENERAL: PAINLEVEL: MODERATE PAIN (5)

## 2019-03-04 ASSESSMENT — MIFFLIN-ST. JEOR: SCORE: 2014.83

## 2019-03-04 NOTE — NURSING NOTE
"Chief Complaint   Patient presents with     Headache       Initial /60   Pulse 82   Temp 97.7  F (36.5  C)   Ht 1.918 m (6' 3.5\")   Wt 91.6 kg (202 lb)   SpO2 98%   BMI 24.91 kg/m   Estimated body mass index is 24.91 kg/m  as calculated from the following:    Height as of this encounter: 1.918 m (6' 3.5\").    Weight as of this encounter: 91.6 kg (202 lb).  Medication Reconciliation: complete    Rafiq Linder LPN  "

## 2019-03-04 NOTE — PROGRESS NOTES
SUBJECTIVE:   Hema Rico is a 19 year old male who presents to clinic today for the following health issues:      Headaches      Duration: 5-6 months    Description  Location: bilateral in the temporal area   Character: squeezing pain  Frequency:  Every day  Duration:  All day    Intensity:  moderate    Accompanying signs and symptoms:    Precipitating or Alleviating factors:  Nausea/vomiting: no  Dizziness: no  Weakness or numbness: no  Visual changes: none  Fever: YES  Sinus or URI symptoms YES- sinus    History  Head trauma: fell 1 year ago and hit head  Family history of migraines: YES- mom  Previous tests for headaches: no  Neurologist evaluations: no  Able to do daily activities when headache present: YES  Wake with headaches: YES  Daily pain medication use: no   Any changes in: no    Precipitating or Alleviating factors (light/sound/sleep/caffeine): caffiene    Therapies tried and outcome: Tylenol    Outcome - usually effective  Frequent/daily pain medication use: no    Wake up with HA and goes to bed with SUH  Constant and low grade HA avg 5/10   Stressed out about it  Feels he forgets things  No focal neuro sx    Fell an hit head- ground level and hit a pipe on right head. NO LOC and had HA for several days     Has chronic sinus sx and increase nasal congestion and allergy sx.   Frontal sinus pain  No teeth pain    Mother has tension HA and migraines  Installs computers in past - unemployed for 3 months     No major stressors  JENAE sx in [place] - he says less   Never filled anxiety meds    [facility]         Problem list and histories reviewed & adjusted, as indicated.  Additional history: as documented    Labs reviewed in EPIC    Reviewed and updated as needed this visit by clinical staff  Tobacco  Allergies  Meds  Med Hx  Surg Hx  Fam Hx  Soc Hx      Reviewed and updated as needed this visit by Provider         ROS:  CONSTITUTIONAL: NEGATIVE for fever, chills, change in  "weight  ENT/MOUTH: NEGATIVE for ear, mouth and throat problems  RESP: NEGATIVE for significant cough or SOB  CV: NEGATIVE for chest pain, palpitations or peripheral edema    OBJECTIVE:                                                    /60   Pulse 82   Temp 97.7  F (36.5  C)   Ht 1.918 m (6' 3.5\")   Wt 91.6 kg (202 lb)   SpO2 98%   BMI 24.91 kg/m    Body mass index is 24.91 kg/m .   GENERAL: healthy, alert, well nourished, well hydrated, no distress  HENT: ear canals- normal; TMs- normal; Nose- normal; Mouth- no ulcers, no lesions  NECK: no tenderness, no adenopathy, no asymmetry, no masses, no stiffness; thyroid- normal to palpation  RESP: lungs clear to auscultation - no rales, no rhonchi, no wheezes  CV: regular rates and rhythm, normal S1 S2, no S3 or S4 and no murmur, no click or rub -  ABDOMEN: soft, no tenderness, no  hepatosplenomegaly, no masses, normal bowel sounds  MS: extremities- no gross deformities noted, no edema  NEURO: strength and tone- normal, sensory exam- grossly normal, mentation- intact, speech- normal, reflexes- symmetric  PSYCH: Alert and oriented times 3; speech- coherent , normal rate and volume; able to articulate logical thoughts, able to abstract reason, no tangential thoughts, no hallucinations or delusions, affect- normal         ASSESSMENT/PLAN:                                                        ICD-10-CM    1. Chronic tension-type headache, not intractable G44.229 CT Head w/o Contrast     CT Sinus w/o Contrast   2. Chronic congestion of paranasal sinus J32.9 CT Head w/o Contrast     CT Sinus w/o Contrast   3. JENAE (generalized anxiety disorder) F41.1      Anxiety high since HA not better. Sounds to be sinus related but pt is concerned of brain issue. Exam unremarkable.  Will order CT above and then go from there on treatment. IF sinus issue.- treat (abx/steroids?/flonase) and if not better see ENT.   Symptomatic treatment was discussed along when patient should call " and/or come back into the clinic or go to ER/Urgent care. All questions answered.         Philippe Duran MD  [facility]

## 2019-03-06 ENCOUNTER — HOSPITAL ENCOUNTER (OUTPATIENT)
Dept: CT IMAGING | Facility: HOSPITAL | Age: 19
End: 2019-03-06
Attending: FAMILY MEDICINE
Payer: COMMERCIAL

## 2019-03-06 ENCOUNTER — HOSPITAL ENCOUNTER (OUTPATIENT)
Dept: CT IMAGING | Facility: HOSPITAL | Age: 19
Discharge: HOME | End: 2019-03-06
Attending: FAMILY MEDICINE | Admitting: FAMILY MEDICINE
Payer: COMMERCIAL

## 2019-03-06 DIAGNOSIS — R09.81 CHRONIC NASAL CONGESTION: ICD-10-CM

## 2019-03-06 DIAGNOSIS — J32.9 CHRONIC CONGESTION OF PARANASAL SINUS: ICD-10-CM

## 2019-03-06 DIAGNOSIS — G44.229 CHRONIC TENSION-TYPE HEADACHE, NOT INTRACTABLE: ICD-10-CM

## 2019-03-06 DIAGNOSIS — F41.1 GAD (GENERALIZED ANXIETY DISORDER): ICD-10-CM

## 2019-03-06 DIAGNOSIS — G44.209 TENSION HEADACHE: Primary | ICD-10-CM

## 2019-03-06 DIAGNOSIS — J34.1 MUCOUS RETENTION CYST OF MAXILLARY SINUS: ICD-10-CM

## 2019-03-06 PROCEDURE — 70486 CT MAXILLOFACIAL W/O DYE: CPT | Mod: TC

## 2019-03-06 PROCEDURE — 70450 CT HEAD/BRAIN W/O DYE: CPT | Mod: TC

## 2019-03-06 RX ORDER — DULOXETIN HYDROCHLORIDE 30 MG/1
30 CAPSULE, DELAYED RELEASE ORAL DAILY
Qty: 30 CAPSULE | Refills: 1 | Status: SHIPPED | OUTPATIENT
Start: 2019-03-06 | End: 2019-04-05

## 2019-03-06 RX ORDER — DULOXETIN HYDROCHLORIDE 30 MG/1
30 CAPSULE, DELAYED RELEASE ORAL DAILY
Qty: 30 CAPSULE | Refills: 1 | Status: SHIPPED | OUTPATIENT
Start: 2019-03-06 | End: 2019-03-06

## 2019-03-06 NOTE — PROGRESS NOTES
Discussed CT scans and sx along with h/o JENAE.  Options given with start him on Cymbalta low dose and f/u with CW in 4 weeks - consider increasing to 60mg then.  R/B of cymbalta discussed.     CT scan shows retention cysts- doubt causing HA- also has lots of chronic nasal congestion. Will set appt up with ENT for evaluation .

## 2019-03-11 NOTE — PATIENT INSTRUCTIONS
Thank you for allowing Nieves Montano CNP and our ENT team to participate in your care.  If your medications are too expensive, please give the nurse a call.  We can possibly change this medication.  If you have a scheduling or an appointment question please contact [facility] Unit Coordinator at their direct line [phone]  ALL nursing questions or concerns can be directed to your ENT nurse at: [phone] - Adela        Budesonide nasal saline irrigation per instructions:  -Obtain Matty Med Sinus rinse over the counter.    -Use warm distilled water and 2 packets of the salt solution that comes with the bottle, dissolve in bottle up to the 240 mL tee.  -Add 1 vial of budesonide.  -Irrigate each side of your nose leaning over the sink, using 1/2 the volume of the bottle in each nostril every irrigation.  Irrigate 2 times daily.  -If additional rinses are needed/recommended, you may use the plain Matty Med Sinus irrigation without the use of added budesonide.       Xyzal (antihistamine) every night  Flonase (nasal steroid) 2 sprays into each nostril middle of the day.    You will get a call to set up sleep study.    Return in 2 months, sooner as needed.

## 2019-03-12 ENCOUNTER — TELEPHONE (OUTPATIENT)
Dept: FAMILY MEDICINE | Facility: OTHER | Age: 19
End: 2019-03-12

## 2019-03-12 NOTE — TELEPHONE ENCOUNTER
Agree with plan if symptoms to not improve he should follow up sooner.     Dona العلي FNP-BC  Family Nurse Practitioner

## 2019-03-12 NOTE — TELEPHONE ENCOUNTER
GENERIC ADULT  Hema Rico is a 19 year old male who calls with side effects from a new prescription for Cymbalta prescribed on 3/6/19.  States he has been nauseated, dry mouth and constipation.  Last night he woke up to go to the bathroom, 10 minutes later he felt like he was seeing stars and dropped to his knees, his body feeling warm and tingly.  His girlfriend dialed 911 and when the EMT's got there they took his vitals and he was fine and he did not go to the ER.  Advised that these are side effects of the med and it states that it could take 2-3 weeks for the symptoms to subside after starting this med and to not quit taking abruptly.  Advised I would send a note to a covering provider and get back to him with recommendations. [phone].      RECOMMENDED DISPOSITION:    Note sent to PCP  Will comply with recommendation: Yes    If further questions/concerns or if symptoms do not improve, worsen or new symptoms develop, call your PCP or [facility] as soon as possible.      Guideline used: Adult Telephone Protocols Office Version 3rd Edition - Dwayne Sarmiento MD, FACEP      Rosa Pavon, SALINAS

## 2019-03-13 ENCOUNTER — OFFICE VISIT (OUTPATIENT)
Dept: OTOLARYNGOLOGY | Facility: OTHER | Age: 19
End: 2019-03-13
Attending: FAMILY MEDICINE
Payer: COMMERCIAL

## 2019-03-13 VITALS
TEMPERATURE: 97.9 F | HEART RATE: 68 BPM | DIASTOLIC BLOOD PRESSURE: 70 MMHG | SYSTOLIC BLOOD PRESSURE: 126 MMHG | HEIGHT: 76 IN | OXYGEN SATURATION: 99 % | WEIGHT: 202 LBS | BODY MASS INDEX: 24.6 KG/M2

## 2019-03-13 DIAGNOSIS — J34.3 NASAL TURBINATE HYPERTROPHY: ICD-10-CM

## 2019-03-13 DIAGNOSIS — J30.2 SEASONAL ALLERGIC RHINITIS, UNSPECIFIED TRIGGER: ICD-10-CM

## 2019-03-13 DIAGNOSIS — J34.1 MUCOUS RETENTION CYST OF MAXILLARY SINUS: ICD-10-CM

## 2019-03-13 DIAGNOSIS — G44.209 TENSION-TYPE HEADACHE, NOT INTRACTABLE, UNSPECIFIED CHRONICITY PATTERN: Primary | ICD-10-CM

## 2019-03-13 DIAGNOSIS — J32.9 CHRONIC CONGESTION OF PARANASAL SINUS: ICD-10-CM

## 2019-03-13 PROCEDURE — 31231 NASAL ENDOSCOPY DX: CPT | Performed by: NURSE PRACTITIONER

## 2019-03-13 PROCEDURE — 99213 OFFICE O/P EST LOW 20 MIN: CPT | Mod: 25 | Performed by: NURSE PRACTITIONER

## 2019-03-13 RX ORDER — BUDESONIDE 0.5 MG/2ML
INHALANT ORAL
Qty: 2 BOX | Refills: 11 | Status: SHIPPED | OUTPATIENT
Start: 2019-03-13 | End: 2019-04-05

## 2019-03-13 RX ORDER — FLUTICASONE PROPIONATE 50 MCG
2 SPRAY, SUSPENSION (ML) NASAL DAILY
Qty: 16 G | Refills: 11 | Status: SHIPPED | OUTPATIENT
Start: 2019-03-13 | End: 2019-04-05

## 2019-03-13 RX ORDER — LEVOCETIRIZINE DIHYDROCHLORIDE 5 MG/1
5 TABLET, FILM COATED ORAL EVERY EVENING
Qty: 30 TABLET | Refills: 11 | Status: SHIPPED | OUTPATIENT
Start: 2019-03-13 | End: 2019-04-05

## 2019-03-13 ASSESSMENT — MIFFLIN-ST. JEOR: SCORE: 2014.83

## 2019-03-13 ASSESSMENT — PAIN SCALES - GENERAL: PAINLEVEL: MILD PAIN (2)

## 2019-03-13 NOTE — LETTER
3/13/2019         RE: Hema Rico  [address] [place] [address]        Dear Colleague,    Thank you for referring your patient, Hema Rico, to the [facility]. Please see a copy of my visit note below.    Otolaryngology Note         Chief Complaint:     Patient presents with:  Ent Problem: Chronic nasal congestion, mucous retention cyst of maxillary sinus; Philippe Duran referring         History of Present Illness:     Hema Rico is a 19 year old male  I was asked to see for evaluation of chronic nasal congestion with recently noted mucous retention cyst of the maxillary sinus. The patient was sent here by Dr. Duran.     For the past 6 months, Hema has been experiencing pain in the bilateral temporal region, describing it as a 'squeezing' pain where it radiates around his entire head like a 'belt is round it'. These are occurring every day, all day. He is not having any N/V, dizziness, vision changes or extremity weakness/paresthesias with these. He finds that he will wake up with these and can last all day. Tylenol will provide mild relief in his symptoms.    Up to date on vision, wears glasses.  No aural symptoms. No weakness/paresthesias of extremities. No N/V.   Started cymbalta 6 days ago for headache/anxiety and feels headaches are getting better.  Mom with migraines.     2 month history of chronic nasal congestion, worse when he lays down, needing to mouth breath. Is having constant PND. No other sinus pain. Has never been treated for sinus infection.   No new environmental exposures.  Seasonal allergies for couple years, worse in spring time. Takes PRN AH, is effective. No history of allergy testing. Lives in house with basement, no water/mold issues. Carpet. 3 dogs. Uses space heater. Family hx of seasonal allergies.  No heroic snoring or apnea. Feels he gets good sleep.   Dad with sleep apnea. One episode one year ago where he woke up feeling he needed  to gasp for air, but has not occurred since then.   Distant tympanostomy tubes as a child.     Has not seen neurology for headaches.     He does report chronic frontal sinus pain along with nasal congestion.  Denies dentalgia or any recent dental work.     Pertinent medical history:  History of episodic tension type headaches  Recent increase in anxiety due to concern regarding headaches.   Hx of fall 1 year ago, resulting in him hitting right side of head on a pipe with no associated LOC (he did not go in for medical evaluation)  Mom with history of migraines and tension type headaches.      Sinus CT 3/6/19:  FINDINGS: There is a moderate-sized mucosal retention cyst at the  floor of the left maxillary sinus. There is a small mucosal retention  cyst at the floor of the right maxillary sinus. There is a small  mucosal retention cyst at the roof of the right maxillary sinus.  The ostiomeatal units are patent.  The sphenoid sinuses and ethmoid air cells and frontal sinuses are  clear.                                                               IMPRESSION: There is a moderate-sized mucosal retention cyst in the  left maxillary sinus and small because retention cyst within the right  maxillary sinus. The ostomy ligaments are patent and the paranasal  sinuses are otherwise clear.    Head CT 3/6/19:  FINDINGS:  Ventricles and sulci are symmetric. The gray-white matter  differentiation throughout the brain is well maintained. There is no  evidence of intracranial mass or hemorrhage. There is a mucosal  retention cyst within the left maxillary sinus. The paranasal sinuses  and mastoid air cells are otherwise clear. There is no evidence of  skull fracture..                                                                IMPRESSION: Mucosal retention cyst of the left maxillary sinus. The  exam is otherwise unremarkable.         Medications:     Current Outpatient Rx   Medication Sig Dispense Refill     DULoxetine (CYMBALTA)  "30 MG capsule Take 1 capsule (30 mg) by mouth daily 30 capsule 1            Allergies:     Allergies: Sulfa drugs          Past Medical History:     No past medical history on file.         Past Surgical History:     No past surgical history on file.    ENT family history reviewed         Social History:     Social History     Tobacco Use     Smoking status: Former Smoker     Types: Dip, chew, snus or snuff, Other     Smokeless tobacco: Never Used     Tobacco comment: e cigarettes   Substance Use Topics     Alcohol use: No     Drug use: No            Review of Systems:     ROS: See HPI         Physical Exam:     /70 (Cuff Size: Adult Regular)   Pulse 68   Temp 97.9  F (36.6  C) (Tympanic)   Ht 1.918 m (6' 3.5\")   Wt 91.6 kg (202 lb)   SpO2 99%   BMI 24.91 kg/m       General - The patient is well nourished and well developed, and appears to have good nutritional status.  Alert and oriented to person and place, answers questions and cooperates with examination appropriately.   Head and Face - Normocephalic and atraumatic, with no gross asymmetry noted.  The facial nerve is intact, with strong symmetric movements.  Voice and Breathing - The patient was breathing comfortably without the use of accessory muscles. There was no wheezing, stridor. The patients voice was clear and strong, and had appropriate pitch and quality.  Ears - External ear normal. Canals are patent. Right tympanic membrane is intact without effusion, retraction or mass. Left tympanic membrane is intact without effusion, retraction or mass.  Eyes - Extraocular movements intact, and the pupils were reactive to light. Sclera were not icteric or injected, conjunctiva were pink and moist.  Mouth - Examination of the oral cavity showed pink, healthy oral mucosa. Dentition in good condition. No lesions or ulcerations noted. The tongue was mobile and midline.   Throat - The walls of the oropharynx were smooth, pink, moist, symmetric, and had no " lesions or ulcerations.  The tonsillar pillars and soft palate were symmetric. The uvula was midline on elevation. Oropharynx is not crowded. FT I Tonsils grade 2.  Neck - Normal midline excursion of the laryngotracheal complex during swallowing.  Full range of motion on passive movement.  Palpation of the occipital, submental, submandibular, internal jugular chain, and supraclavicular nodes did not demonstrate any abnormal lymph nodes or masses.  Palpation of the thyroid was soft and smooth, with no nodules or goiter appreciated.  The trachea was mobile and midline.  Nose - External contour is symmetric, no gross deflection or scars.     To further evaluate the nasal cavity, I performed rigid nasal endoscopy.  I first sprayed the nasal cavity bilaterally with a mix of lidocaine and neosynephrine.  I used a 2.7mm,   30 degree rigid nasal endoscope for examination.    Septum mild irregularity with generalized mucosal edema    Left side:    Inferior turbinate hypertrophy.  The left middle turbinate and middle meatus were clearly visualized, narrowed meatus.  Going further back, the sphenoethmoid and frontal recess draining white/clear secretions.   The nasopharynx was with clear/white secretions and the eustachian tube opening on this side was unobstructed.  Grade 1 adenoid tissue.  No purulence, or polyps were noted.    Right side:    Inferior turbinate hypertrophy.  The right middle turbinate and middle meatus were clearly visualized and normal in appearance, narrow meatus.  Going further back, the sphenoethmoid and frontal recess with clear/white secretions  The nasopharynx was with clear secretions and the eustachian tube opening on this side was unobstructed.  Grade 1 adenoid tissue.  No purulence, or polyps were noted.         Assessment and Plan:       ICD-10-CM    1. Tension-type headache, not intractable, unspecified chronicity pattern G44.209 SLEEP EVALUATION & MANAGEMENT REFERRAL - [facility]  [facility] [phone] (Age 5 and up)   2. Chronic congestion of paranasal sinus J32.9 fluticasone (FLONASE) 50 MCG/ACT nasal spray     levocetirizine (XYZAL) 5 MG tablet     budesonide (PULMICORT) 0.5 MG/2ML neb solution   3. Mucous retention cyst of maxillary sinus J34.1    4. Seasonal allergic rhinitis, unspecified trigger J30.2 fluticasone (FLONASE) 50 MCG/ACT nasal spray     levocetirizine (XYZAL) 5 MG tablet     budesonide (PULMICORT) 0.5 MG/2ML neb solution   5. Nasal turbinate hypertrophy J34.3 fluticasone (FLONASE) 50 MCG/ACT nasal spray     levocetirizine (XYZAL) 5 MG tablet     budesonide (PULMICORT) 0.5 MG/2ML neb solution     Reviewed sinus CT with him and explained that many sinus mucosal retention cysts are most often found incidentally and are generally asymptomatic. Given the location of his sinus cyst, I do not feel it is the cause of his headache.    His headache sounds like more of a tension headache.    Will get a sleep study done due to these daily morning headaches, along with family history of sleep apnea.     Budesonide nasal irrigation BID  Xyzal 5 mg every night  Flonase daily as directed.    Return in 2 months for re-evaluation, sooner as needed.  If sleep study negative and/or more concerns regarding headaches, can refer to neurology.       Nieves Montano NP  ENT  [facility], [place]  [phone]      [id number]    Again, thank you for allowing me to participate in the care of your patient.        Sincerely,        Nieves Montano, ZOHRA CNP

## 2019-03-13 NOTE — NURSING NOTE
"Chief Complaint   Patient presents with     Ent Problem     Chronic nasal congestion, mucous retention cyst of maxillary sinus; Philippe Duran referring       Initial /70 (Cuff Size: Adult Regular)   Pulse 68   Temp 97.9  F (36.6  C) (Tympanic)   Ht 1.918 m (6' 3.5\")   Wt 91.6 kg (202 lb)   SpO2 99%   BMI 24.91 kg/m   Estimated body mass index is 24.91 kg/m  as calculated from the following:    Height as of this encounter: 1.918 m (6' 3.5\").    Weight as of this encounter: 91.6 kg (202 lb).  Medication Reconciliation: complete    Tiffanie Albrecht LPN    "

## 2019-03-13 NOTE — PROGRESS NOTES
Otolaryngology Note         Chief Complaint:     Patient presents with:  Ent Problem: Chronic nasal congestion, mucous retention cyst of maxillary sinus; Philippe Duran referring         History of Present Illness:     Hema Rico is a 19 year old male  I was asked to see for evaluation of chronic nasal congestion with recently noted mucous retention cyst of the maxillary sinus. The patient was sent here by Dr. Duran.     For the past 6 months, Hema has been experiencing pain in the bilateral temporal region, describing it as a 'squeezing' pain where it radiates around his entire head like a 'belt is round it'. These are occurring every day, all day. He is not having any N/V, dizziness, vision changes or extremity weakness/paresthesias with these. He finds that he will wake up with these and can last all day. Tylenol will provide mild relief in his symptoms.    Up to date on vision, wears glasses.  No aural symptoms. No weakness/paresthesias of extremities. No N/V.   Started cymbalta 6 days ago for headache/anxiety and feels headaches are getting better.  Mom with migraines.     2 month history of chronic nasal congestion, worse when he lays down, needing to mouth breath. Is having constant PND. No other sinus pain. Has never been treated for sinus infection.   No new environmental exposures.  Seasonal allergies for couple years, worse in spring time. Takes PRN AH, is effective. No history of allergy testing. Lives in house with basement, no water/mold issues. Carpet. 3 dogs. Uses space heater. Family hx of seasonal allergies.  No heroic snoring or apnea. Feels he gets good sleep.   Dad with sleep apnea. One episode one year ago where he woke up feeling he needed to gasp for air, but has not occurred since then.   Distant tympanostomy tubes as a child.     Has not seen neurology for headaches.     He does report chronic frontal sinus pain along with nasal congestion.  Denies dentalgia or any recent dental  work.     Pertinent medical history:  History of episodic tension type headaches  Recent increase in anxiety due to concern regarding headaches.   Hx of fall 1 year ago, resulting in him hitting right side of head on a pipe with no associated LOC (he did not go in for medical evaluation)  Mom with history of migraines and tension type headaches.      Sinus CT 3/6/19:  FINDINGS: There is a moderate-sized mucosal retention cyst at the  floor of the left maxillary sinus. There is a small mucosal retention  cyst at the floor of the right maxillary sinus. There is a small  mucosal retention cyst at the roof of the right maxillary sinus.  The ostiomeatal units are patent.  The sphenoid sinuses and ethmoid air cells and frontal sinuses are  clear.                                                               IMPRESSION: There is a moderate-sized mucosal retention cyst in the  left maxillary sinus and small because retention cyst within the right  maxillary sinus. The ostomy ligaments are patent and the paranasal  sinuses are otherwise clear.    Head CT 3/6/19:  FINDINGS:  Ventricles and sulci are symmetric. The gray-white matter  differentiation throughout the brain is well maintained. There is no  evidence of intracranial mass or hemorrhage. There is a mucosal  retention cyst within the left maxillary sinus. The paranasal sinuses  and mastoid air cells are otherwise clear. There is no evidence of  skull fracture..                                                                IMPRESSION: Mucosal retention cyst of the left maxillary sinus. The  exam is otherwise unremarkable.         Medications:     Current Outpatient Rx   Medication Sig Dispense Refill     DULoxetine (CYMBALTA) 30 MG capsule Take 1 capsule (30 mg) by mouth daily 30 capsule 1            Allergies:     Allergies: Sulfa drugs          Past Medical History:     No past medical history on file.         Past Surgical History:     No past surgical history on  "file.    ENT family history reviewed         Social History:     Social History     Tobacco Use     Smoking status: Former Smoker     Types: Dip, chew, snus or snuff, Other     Smokeless tobacco: Never Used     Tobacco comment: e cigarettes   Substance Use Topics     Alcohol use: No     Drug use: No            Review of Systems:     ROS: See HPI         Physical Exam:     /70 (Cuff Size: Adult Regular)   Pulse 68   Temp 97.9  F (36.6  C) (Tympanic)   Ht 1.918 m (6' 3.5\")   Wt 91.6 kg (202 lb)   SpO2 99%   BMI 24.91 kg/m      General - The patient is well nourished and well developed, and appears to have good nutritional status.  Alert and oriented to person and place, answers questions and cooperates with examination appropriately.   Head and Face - Normocephalic and atraumatic, with no gross asymmetry noted.  The facial nerve is intact, with strong symmetric movements.  Voice and Breathing - The patient was breathing comfortably without the use of accessory muscles. There was no wheezing, stridor. The patients voice was clear and strong, and had appropriate pitch and quality.  Ears - External ear normal. Canals are patent. Right tympanic membrane is intact without effusion, retraction or mass. Left tympanic membrane is intact without effusion, retraction or mass.  Eyes - Extraocular movements intact, and the pupils were reactive to light. Sclera were not icteric or injected, conjunctiva were pink and moist.  Mouth - Examination of the oral cavity showed pink, healthy oral mucosa. Dentition in good condition. No lesions or ulcerations noted. The tongue was mobile and midline.   Throat - The walls of the oropharynx were smooth, pink, moist, symmetric, and had no lesions or ulcerations.  The tonsillar pillars and soft palate were symmetric. The uvula was midline on elevation. Oropharynx is not crowded. FT I Tonsils grade 2.  Neck - Normal midline excursion of the laryngotracheal complex during swallowing.  " Full range of motion on passive movement.  Palpation of the occipital, submental, submandibular, internal jugular chain, and supraclavicular nodes did not demonstrate any abnormal lymph nodes or masses.  Palpation of the thyroid was soft and smooth, with no nodules or goiter appreciated.  The trachea was mobile and midline.  Nose - External contour is symmetric, no gross deflection or scars.     To further evaluate the nasal cavity, I performed rigid nasal endoscopy.  I first sprayed the nasal cavity bilaterally with a mix of lidocaine and neosynephrine.  I used a 2.7mm,   30 degree rigid nasal endoscope for examination.    Septum mild irregularity with generalized mucosal edema    Left side:    Inferior turbinate hypertrophy.  The left middle turbinate and middle meatus were clearly visualized, narrowed meatus.  Going further back, the sphenoethmoid and frontal recess draining white/clear secretions.   The nasopharynx was with clear/white secretions and the eustachian tube opening on this side was unobstructed.  Grade 1 adenoid tissue.  No purulence, or polyps were noted.    Right side:    Inferior turbinate hypertrophy.  The right middle turbinate and middle meatus were clearly visualized and normal in appearance, narrow meatus.  Going further back, the sphenoethmoid and frontal recess with clear/white secretions  The nasopharynx was with clear secretions and the eustachian tube opening on this side was unobstructed.  Grade 1 adenoid tissue.  No purulence, or polyps were noted.         Assessment and Plan:       ICD-10-CM    1. Tension-type headache, not intractable, unspecified chronicity pattern G44.209 [place]ALU[place] & [place] [facility] [phone]Age 5 and [place]   2. Chronic congestion of paranasal sinus J32.9 fluticasone (FLONASE) 50 MCG/ACT nasal spray     levocetirizine (XYZAL) 5 MG tablet     budesonide (PULMICORT) 0.5 MG/2ML neb solution   3. Mucous retention  cyst of maxillary sinus J34.1    4. Seasonal allergic rhinitis, unspecified trigger J30.2 fluticasone (FLONASE) 50 MCG/ACT nasal spray     levocetirizine (XYZAL) 5 MG tablet     budesonide (PULMICORT) 0.5 MG/2ML neb solution   5. Nasal turbinate hypertrophy J34.3 fluticasone (FLONASE) 50 MCG/ACT nasal spray     levocetirizine (XYZAL) 5 MG tablet     budesonide (PULMICORT) 0.5 MG/2ML neb solution     Reviewed sinus CT with him and explained that many sinus mucosal retention cysts are most often found incidentally and are generally asymptomatic. Given the location of his sinus cyst, I do not feel it is the cause of his headache.    His headache sounds like more of a tension headache.    Will get a sleep study done due to these daily morning headaches, along with family history of sleep apnea.     Budesonide nasal irrigation BID  Xyzal 5 mg every night  Flonase daily as directed.    Return in 2 months for re-evaluation, sooner as needed.  If sleep study negative and/or more concerns regarding headaches, can refer to neurology.       Nieves Montano NP  ENT  [facility], [place]  [phone]

## 2019-03-29 NOTE — PROGRESS NOTES
SUBJECTIVE:   Hema Rico is a 19 year old male who presents to clinic today for the following health issues:      Anxiety Follow-Up    Status since last visit: Improved less worrying, falling asleep better     Other associated symptoms:None    Complicating factors:   Significant life event: Girlfriend moved away to college  Current substance abuse: None  Depression symptoms: No  JENAE-7 SCORE 4/14/2017 8/1/2018 11/1/2018   Total Score 0 12 8       JENAE-7    Amount of exercise or physical activity: 2-3 days/week for an average of 15-30 minutes    Problems taking medications regularly: No    Medication side effects: No    Diet: regular (no restrictions)        Medication Followup of cymbalta    Taking Medication as prescribed: yes    Side Effects:  None    Medication Helping Symptoms:  yes       Problem list and histories reviewed & adjusted, as indicated.  Additional history: as documented    Patient Active Problem List   Diagnosis     Episodic tension-type headache, not intractable     JENAE (generalized anxiety disorder)     Tension headache     No past surgical history on file.    Social History     Tobacco Use     Smoking status: Former Smoker     Types: Dip, chew, snus or snuff, Other     Smokeless tobacco: Never Used     Tobacco comment: e cigarettes   Substance Use Topics     Alcohol use: No     Family History   Problem Relation Age of Onset     Asthma Mother      Anxiety Disorder Mother      Psychotic Disorder Father         panic attacks     Hypertension Father      Family History Negative Maternal Grandmother      Family History Negative Brother      Anxiety Disorder Brother         panic attack, drug abuse     Hypertension Maternal Grandfather      C.A.D. Maternal Grandfather      Family History Negative Paternal Grandmother      Family History Negative Paternal Grandfather      Gastrointestinal Disease Maternal Aunt         crohns     Heart Disease Other         family history Afib     Heart Disease  "Other         family history mitral valve     Thyroid Disease No family hx of          Current Outpatient Medications   Medication Sig Dispense Refill     budesonide (PULMICORT) 0.5 MG/2ML neb solution Squirt entire vial into previously made roz med saline bottle, mix, irrigate both nostrils until entire bottle empty. Do this twice daily. 2 Box 11     DULoxetine (CYMBALTA) 30 MG capsule Take 1 capsule (30 mg) by mouth daily 30 capsule 1     fluticasone (FLONASE) 50 MCG/ACT nasal spray Spray 2 sprays into both nostrils daily 16 g 11     levocetirizine (XYZAL) 5 MG tablet Take 1 tablet (5 mg) by mouth every evening 30 tablet 11     Allergies   Allergen Reactions     Sulfa Drugs      Recent Labs   Lab Test 08/01/18  1210 11/25/14  1628   ALT 32 21   CR 0.63* 0.69   GFRESTIMATED >90 >90  Non African American GFR Calc     GFRESTBLACK >90 >90  African American GFR Calc     POTASSIUM 3.7 3.6   TSH 2.59 3.06      BP Readings from Last 3 Encounters:   04/05/19 112/70   03/13/19 126/70   03/04/19 132/60    Wt Readings from Last 3 Encounters:   04/05/19 89.8 kg (198 lb)   03/13/19 91.6 kg (202 lb)   03/04/19 91.6 kg (202 lb)                    Reviewed and updated as needed this visit by clinical staff       Reviewed and updated as needed this visit by Provider         ROS:  Constitutional, neuro, ENT, endocrine, pulmonary, cardiac, gastrointestinal, genitourinary, musculoskeletal, integument and psychiatric systems are negative, except as otherwise noted.    OBJECTIVE:                                                    /70 (BP Location: Left arm, Patient Position: Sitting, Cuff Size: Adult Regular)   Pulse 84   Temp 97.9  F (36.6  C) (Tympanic)   Ht 1.918 m (6' 3.5\")   Wt 89.8 kg (198 lb)   SpO2 98%   BMI 24.42 kg/m    Body mass index is 24.42 kg/m .  GENERAL APPEARANCE: healthy, alert and no distress  EYES: Eyes grossly normal to inspection, PERRL and conjunctivae and sclerae normal  HENT: ear canals and TM's " normal and nose and mouth without ulcers or lesions  NECK: no adenopathy, no asymmetry, masses, or scars and thyroid normal to palpation  RESP: lungs clear to auscultation - no rales, rhonchi or wheezes  CV: regular rates and rhythm, normal S1 S2, no S3 or S4 and no murmur, click or rub  MS: extremities normal- no gross deformities noted  SKIN: no suspicious lesions or rashes  NEURO: Normal strength and tone, mentation intact and speech normal  PSYCH: mentation appears normal, affect normal/bright.  Discussion on no panic attacks and doing much better on his mood.  Mild dizziness.     Diagnostic test results:  Diagnostic Test Results:  Results for orders placed or performed during the hospital encounter of 03/06/19   CT Sinus w/o Contrast    Narrative    CT SINUS W/O CONTRAST    HISTORY: 19 yearsMale Ped, sinusitis, persistent, recurring, or  chronic; Chronic tension-type headache, not intractable; Chronic  congestion of paranasal sinus    TECHNIQUE: Axial CT imaging of the paranasal sinuses was performed.  Coronal reconstructions were obtained.    COMPARISON: None    FINDINGS: There is a moderate-sized mucosal retention cyst at the  floor of the left maxillary sinus. There is a small mucosal retention  cyst at the floor of the right maxillary sinus. There is a small  mucosal retention cyst at the roof of the right maxillary sinus.    The ostiomeatal units are patent.    The sphenoid sinuses and ethmoid air cells and frontal sinuses are  clear.      Impression    IMPRESSION: There is a moderate-sized mucosal retention cyst in the  left maxillary sinus and small because retention cyst within the right  maxillary sinus. The ostomy ligaments are patent and the paranasal  sinuses are otherwise clear.    ANGELIC MOISE MD        ASSESSMENT/PLAN:                                                    1. Tension headache  Awaiting on Sleep Study.  He is going to be sending in his survey today.  Having significant blockage  in his nasal passage that is improved but still has headaches.  Cymbalta somewhat helpful.   - DULoxetine (CYMBALTA) 30 MG capsule; Take 1 capsule (30 mg) by mouth daily  Dispense: 90 capsule; Refill: 1    2. JENAE (generalized anxiety disorder)  He is much better. Anxiety is better. Will con't  same dose.   - DULoxetine (CYMBALTA) 30 MG capsule; Take 1 capsule (30 mg) by mouth daily  Dispense: 90 capsule; Refill: 1    3. Chronic congestion of paranasal sinus  Given refill. Improved.   - fluticasone (FLONASE) 50 MCG/ACT nasal spray; Spray 2 sprays into both nostrils daily  Dispense: 16 g; Refill: 11  - levocetirizine (XYZAL) 5 MG tablet; Take 1 tablet (5 mg) by mouth every evening  Dispense: 30 tablet; Refill: 11    4. Seasonal allergic rhinitis, unspecified trigger  Aware this is his time of year for worsening sx.  He has done sinus washes also.   - fluticasone (FLONASE) 50 MCG/ACT nasal spray; Spray 2 sprays into both nostrils daily  Dispense: 16 g; Refill: 11  - levocetirizine (XYZAL) 5 MG tablet; Take 1 tablet (5 mg) by mouth every evening  Dispense: 30 tablet; Refill: 11    5. Nasal turbinate hypertrophy  As above.   - fluticasone (FLONASE) 50 MCG/ACT nasal spray; Spray 2 sprays into both nostrils daily  Dispense: 16 g; Refill: 11  - levocetirizine (XYZAL) 5 MG tablet; Take 1 tablet (5 mg) by mouth every evening  Dispense: 30 tablet; Refill: 11      See Patient Instructions    FIDENCIO Platt  [facility] - [place]

## 2019-04-05 ENCOUNTER — DOCUMENTATION ONLY (OUTPATIENT)
Dept: SLEEP MEDICINE | Facility: HOSPITAL | Age: 19
End: 2019-04-05

## 2019-04-05 ENCOUNTER — OFFICE VISIT (OUTPATIENT)
Dept: FAMILY MEDICINE | Facility: OTHER | Age: 19
End: 2019-04-05
Attending: PHYSICIAN ASSISTANT
Payer: COMMERCIAL

## 2019-04-05 VITALS
SYSTOLIC BLOOD PRESSURE: 112 MMHG | OXYGEN SATURATION: 98 % | HEART RATE: 84 BPM | WEIGHT: 198 LBS | TEMPERATURE: 97.9 F | DIASTOLIC BLOOD PRESSURE: 70 MMHG | HEIGHT: 76 IN | BODY MASS INDEX: 24.11 KG/M2

## 2019-04-05 DIAGNOSIS — G44.209 TENSION HEADACHE: ICD-10-CM

## 2019-04-05 DIAGNOSIS — J30.2 SEASONAL ALLERGIC RHINITIS, UNSPECIFIED TRIGGER: ICD-10-CM

## 2019-04-05 DIAGNOSIS — J34.3 NASAL TURBINATE HYPERTROPHY: ICD-10-CM

## 2019-04-05 DIAGNOSIS — R09.81 NASAL CONGESTION: Chronic | ICD-10-CM

## 2019-04-05 DIAGNOSIS — J32.9 CHRONIC CONGESTION OF PARANASAL SINUS: ICD-10-CM

## 2019-04-05 DIAGNOSIS — F41.1 GAD (GENERALIZED ANXIETY DISORDER): ICD-10-CM

## 2019-04-05 DIAGNOSIS — F17.200 TOBACCO USE DISORDER: ICD-10-CM

## 2019-04-05 PROCEDURE — 99214 OFFICE O/P EST MOD 30 MIN: CPT | Performed by: PHYSICIAN ASSISTANT

## 2019-04-05 RX ORDER — DULOXETIN HYDROCHLORIDE 30 MG/1
30 CAPSULE, DELAYED RELEASE ORAL DAILY
Qty: 90 CAPSULE | Refills: 1 | Status: SHIPPED | OUTPATIENT
Start: 2019-04-05 | End: 2019-09-09

## 2019-04-05 RX ORDER — LEVOCETIRIZINE DIHYDROCHLORIDE 5 MG/1
5 TABLET, FILM COATED ORAL EVERY EVENING
Qty: 30 TABLET | Refills: 11 | Status: SHIPPED | OUTPATIENT
Start: 2019-04-05 | End: 2019-09-09

## 2019-04-05 RX ORDER — BUDESONIDE 0.5 MG/2ML
INHALANT ORAL
Qty: 2 BOX | Refills: 11 | Status: SHIPPED | OUTPATIENT
Start: 2019-04-05 | End: 2019-09-09

## 2019-04-05 RX ORDER — FLUTICASONE PROPIONATE 50 MCG
2 SPRAY, SUSPENSION (ML) NASAL DAILY
Qty: 16 G | Refills: 11 | Status: SHIPPED | OUTPATIENT
Start: 2019-04-05 | End: 2019-09-09

## 2019-04-05 ASSESSMENT — ANXIETY QUESTIONNAIRES
5. BEING SO RESTLESS THAT IT IS HARD TO SIT STILL: NOT AT ALL
2. NOT BEING ABLE TO STOP OR CONTROL WORRYING: SEVERAL DAYS
1. FEELING NERVOUS, ANXIOUS, OR ON EDGE: SEVERAL DAYS
6. BECOMING EASILY ANNOYED OR IRRITABLE: SEVERAL DAYS
GAD7 TOTAL SCORE: 4
3. WORRYING TOO MUCH ABOUT DIFFERENT THINGS: SEVERAL DAYS
7. FEELING AFRAID AS IF SOMETHING AWFUL MIGHT HAPPEN: NOT AT ALL
IF YOU CHECKED OFF ANY PROBLEMS ON THIS QUESTIONNAIRE, HOW DIFFICULT HAVE THESE PROBLEMS MADE IT FOR YOU TO DO YOUR WORK, TAKE CARE OF THINGS AT HOME, OR GET ALONG WITH OTHER PEOPLE: NOT DIFFICULT AT ALL

## 2019-04-05 ASSESSMENT — PATIENT HEALTH QUESTIONNAIRE - PHQ9
SUM OF ALL RESPONSES TO PHQ QUESTIONS 1-9: 2
5. POOR APPETITE OR OVEREATING: NOT AT ALL

## 2019-04-05 ASSESSMENT — MIFFLIN-ST. JEOR: SCORE: 1996.68

## 2019-04-05 ASSESSMENT — PAIN SCALES - GENERAL: PAINLEVEL: NO PAIN (1)

## 2019-04-05 NOTE — PROGRESS NOTES
STOP CHRISTA       Name: Hema Rico MRN# [id number]   Age: 19 year old YOB: 2000     Stop Bang questionnaire completed with a score of >3 to allow for HST     Have you been told you snore loudly (louder than talking or loud enough to be heard through doors)? NO    Do you often feel tired, fatigued, or sleepy during the daytime? NO    Has anyone observed you stop breathing during your sleep? NO    Do you have or are you being treated for high blood pressure? NO    Is your BMI greater than 35? NO    Is your neck size circumference 16 inches or greater? NO    Are you over 50 years old? NO    Stop Bang Score (# of yes): 1

## 2019-04-05 NOTE — PROGRESS NOTES
SLEEP HISTORY QUESTIONNAIRE    Please describe the main reason for your sleep appointment? Headaches and bad sleep    How long has this been a problem?     Have you been diagnosed with a sleep problem in the past? NO    If so, what?     What treatment was recommended?     Have you had a sleep study in the past? NO    If yes, where and when?     Sleep Habits:   Do you read in bed? No  Do you eat in bed? Yes  Do you watch TV in bed? Yes  Do you work in bed? No  Do you use a phone or computer in bed? Yes    Is you sleep disturbed by:   Bed partner: No  Children: No  Noise: Yes   Pets: Yes  Other:       On two or more nights per week, do you drink alcohol to help you fall asleep?NO    On two or more nights per week, do you take melatonin to help you fall asleep? YES    On two or more nights per week, do you take over the counter medicine to fall asleep?  NO    Do you take drinks with caffeine (coffee, tea, soda, energy drinks)? YES    Do you have 3 or more caffeine drinks in a day? NO    Do you have caffeine drinks within 6 hours of bedtime? NO    Do you smoke or use tobacco? YES    Do you exercise? NO    Sleep Routine:   Using a 24 Hour Clock    What time do you usually get into bed on workdays? 11 pm    Weekend/non work days? 1 am    What time do you get out of bed on workdays? 9 am      Weekend/non work days?11 am    Do you work the evening or night shift or do your shifts rotate? NO    How long does it usually take to fall to sleep? 30 minutes    How many times do you wake during the night? 2    How much time do you feel that you are awake during the entire night? 20 minutes    How long does it take for you to fall back to sleep after you wake up? 3 minutes    Why do you think you wake up? Noise from dogs and others in the house    What do you do when you wake up? Try to go back to sleep      How much sleep do you think you get on work nights? 8 hours    How much sleep do you think you get on weekends/non work days?  7 hours    How much sleep do you think you need to feel your best? 8 hours    How many days during a week do you take a nap on average? 1     What is the average length of your naps? 30 minutes    Do you feel better after taking a nap? YES    If you could chose the best sleep schedule for you, what time would you go to bed? 12 am  What time would you get up? 8 am    Do you read in bed? NO    Do you eat in bed? YES    Do you watch TV in bed? YES    Do you do work in bed? NO    Do you use a computer or phone in bed? YES    Sleep Disruptions?   Leg movements:  Do you ever have restless, crawling, aching or other unusual feelings in your legs? NO    Do you ever wake yourself by kicking your legs during the night? NO    Are the sheets and blankets messed up or tossed about when you get up? YES    Night-time behaviors:   Do you have nightmares or night terrors? NO   How often?     Have you had times when you were sleep walking? YES    Have you been seen doing anything unusual while you sleep at nights? NO  What?   How often?     Have you ever hurt yourself or someone else while you were sleeping? NO  Please describe:     Do you clench or grind your teeth during the night? yes    Sleep Apnea (pauses in breathing during sleep):  Do you wake with a headache in the morning? YES  How often? For the last 6 months    Does your bed partner, family or friends ever say that you snore? NO  How many nights per week do you snore?   Can snoring be heard outside the bedroom?     Do you ever wake yourself up from snoring, gasping or choking? YES    Have you ever been told that you stop breathing or have pauses in your breathing? NO    Do you wake in the morning with a dry throat or mouth? YES    Do you have trouble breathing through your nose? YES    Do you have problems with heartburn, reflux or a hiatal hernia? NO    Which positions do you usually sleep in? (stomach, back, sides, all) sides    Do you use oxygen or any other medical  equipment when you sleep? NO    Do members of your family (related by blood) snore? YES    Have any members of your family been diagnosed with with sleep apnea? YES    Do other members of your family have restless leg? NO    Do other members of your family have sleep walking? NO    Have you ever had an accident, or near accident due to sleepiness while driving? NO    Does your sleepiness affect your work on the job or at school? NO    Do you ever fall asleep by accident while doing a task? NO    Have you had sudden muscle weakness when laughing, angry or surprised? NO    Have you ever been unable to move your body when falling asleep or waking up? NO    Do you ever have trouble  your dreams from real life events? NO  Please describe:     Physical Health: (including illness and injury): During the past 30 days, on how many days was your physical health not good? 0/30 days     Mental Health: (including stress, depression, and problems with emotions): During the last 30 days, how may days was your mental health not good? 5/30 days.     During the past 30 days, on how many days did poor physical or mental health keep you from doing your usual activities? This might be self-care, work, or play? 0/30 days.     Social History:   Marital status: single    Who lives in your home with you? Mother and step dad    Mother (alive or dead)? alive If has , from what?   Father (alive or dead)? alive If has , from what?     Siblings: NO  Have any ? NO  If so, from what?     Currently working? NO  If yes, work:   Former jobs: IT field engineering     Sleepiness Scale:   Sitting and reading 0   Watching TV 1   Sitting in a public place 0   Riding in a car 0   Lying down to rest in the afternoon 2   Sitting and talking to someone 0   Sitting quietly after a lunch without alcohol 0   In a car, stopping for a few minutes in traffic 0       Surgical History: No past surgical history on file.    Medical Conditions: No  past medical history on file.    Medications:   Current Outpatient Medications   Medication Sig     budesonide (PULMICORT) 0.5 MG/2ML neb solution Squirt entire vial into previously made roz med saline bottle, mix, irrigate both nostrils until entire bottle empty. Do this twice daily.     DULoxetine (CYMBALTA) 30 MG capsule Take 1 capsule (30 mg) by mouth daily     fluticasone (FLONASE) 50 MCG/ACT nasal spray Spray 2 sprays into both nostrils daily     levocetirizine (XYZAL) 5 MG tablet Take 1 tablet (5 mg) by mouth every evening     No current facility-administered medications for this visit.        Are you currently having any of the following symptoms?   General:   Obvious weight gain or loss NO  Fever, chills or sweats NO  Drug allergies: sulfa    Eyes:   Changes in vision NO  Blind spots NO  Double vision NO  Other     Ear, Nose and Throat:   Ear pain NO  Sore throat YES  Sinus pain YES  Post-nasal drip YES  Runny nose YES  Bloody nose NO    Heart:   Rapid or irregular heart beat NO  Chest pain or pressure NO  Out of breath when lying down NO  Swelling in feet or legs NO  High blood pressure NO  Heart disease NO    Nervous system   Headaches YES  Weakness in arms or legs NO  Numbness in arms of legs NO  Other:     Skin  Rashes NO  New moles or skin changes NO  Other     Lungs  Shortness of breath at rest NO  Shortness of breath with activity NO  Dry cough YES  Coughing up mucous or phlegm YES  Coughing up blood NO  Wheezing when breathing NO    Lymph System  Swollen lymph nodes NO  New lumps or bumps NO  Changes in breasts or discharge NO    Digestive System   Nausea or vomiting NO  Loose or watery stools YES  Hard, dry stools (constipation) YES  Fat or grease in stools NO  Blood in stools NO  Stools are black or bloody NO  Abdominal (belly) pain NO    Urinary Tract   Pain when you urinate (pee) NO  Blood in your urine NO  Urinate (pee) more than normal NO  Irregular periods NO    Muscles and bones   Muscle  pain NO  Joint or bone pain NO  Swollen joints NO  Other back and neck pain    Glands  Increased thirst or urination YES  Diabetes NO  Morning glucose:   Afternoon glucose:     Mental Health  Depression NO  Anxiety NO  Other mental health issues:

## 2019-04-05 NOTE — NURSING NOTE
"No chief complaint on file.      Initial /70 (BP Location: Left arm, Patient Position: Sitting, Cuff Size: Adult Regular)   Pulse 84   Temp 97.9  F (36.6  C) (Tympanic)   Ht 1.918 m (6' 3.5\")   Wt 89.8 kg (198 lb)   SpO2 98%   BMI 24.42 kg/m   Estimated body mass index is 24.42 kg/m  as calculated from the following:    Height as of this encounter: 1.918 m (6' 3.5\").    Weight as of this encounter: 89.8 kg (198 lb).  Medication Reconciliation: complete    Christine Collins LPN    "

## 2019-04-05 NOTE — PATIENT INSTRUCTIONS
Thank you for choosing [facility].   I have office hours 8:00 am to 4:30 pm on Monday's, Wednesday's, Thursday's and Friday's. My nurse and I are out of the office every Tuesday.    Following your visit, when your labs and diagnostic testing have returned, I will review then and you will be contacted by my nurse.  If you are on My Chart, you can also view results there.    For refills, notify your pharmacy regarding what you need and the pharmacy will generate a refill request. Do not call my nurse as she is unable to process refill request. Please plan ahead and allow 3-5 days for refill requests.    You will generally receive a reminder call the day prior to your appointment.  If you cannot attend your appointment, please cancel your appointment with as much notice as possible.  If there is a pattern of failure to present for your appointments, I cannot provide consistent, meaningful, ongoing care for you. It is very important to me that you come in for your care, so we can best assist you with your health care needs.    IMPORTANT:  Please note that it is my standard of practice to NOT participate in prescribing ongoing requested Narcotic Analgesic therapy, and/or participate in the prescribing of other controlled substances.  My nurse and I am happy to assist you with the process of referral for alternative pain management as needed, and other treatment modalities including but not limited to:  Physical Therapy, Physical Medicine and Rehab, Counseling, Chiropractic Care, Orthopedic Care, and non-narcotic medication management.     In the event that you need to be seen for emergent concerns and I am out of office,  please see one of my colleagues for acute concerns.  You may also present to  or ER.  I appreciate the opportunity to serve you and look forward to supporting your healthcare needs in the future. Please contact me with any questions or concerns that you may  have.    Sincerely,      Pema Allison RN, PA-C

## 2019-04-06 ASSESSMENT — ANXIETY QUESTIONNAIRES: GAD7 TOTAL SCORE: 4

## 2019-05-14 ENCOUNTER — OFFICE VISIT (OUTPATIENT)
Dept: OTOLARYNGOLOGY | Facility: OTHER | Age: 19
End: 2019-05-14
Attending: NURSE PRACTITIONER
Payer: COMMERCIAL

## 2019-05-14 VITALS
OXYGEN SATURATION: 99 % | TEMPERATURE: 97.6 F | DIASTOLIC BLOOD PRESSURE: 76 MMHG | WEIGHT: 198 LBS | HEART RATE: 88 BPM | BODY MASS INDEX: 24.11 KG/M2 | SYSTOLIC BLOOD PRESSURE: 122 MMHG | HEIGHT: 76 IN

## 2019-05-14 DIAGNOSIS — J32.9 CHRONIC CONGESTION OF PARANASAL SINUS: ICD-10-CM

## 2019-05-14 DIAGNOSIS — J34.3 NASAL TURBINATE HYPERTROPHY: ICD-10-CM

## 2019-05-14 DIAGNOSIS — J30.2 SEASONAL ALLERGIC RHINITIS, UNSPECIFIED TRIGGER: Primary | ICD-10-CM

## 2019-05-14 DIAGNOSIS — J34.1 MUCOUS RETENTION CYST OF MAXILLARY SINUS: ICD-10-CM

## 2019-05-14 DIAGNOSIS — G44.209 TENSION-TYPE HEADACHE, NOT INTRACTABLE, UNSPECIFIED CHRONICITY PATTERN: ICD-10-CM

## 2019-05-14 PROCEDURE — 99214 OFFICE O/P EST MOD 30 MIN: CPT | Performed by: NURSE PRACTITIONER

## 2019-05-14 RX ORDER — LORATADINE 10 MG/1
10 TABLET ORAL DAILY
Qty: 30 TABLET | Refills: 11 | Status: SHIPPED | OUTPATIENT
Start: 2019-05-14 | End: 2020-05-13

## 2019-05-14 ASSESSMENT — MIFFLIN-ST. JEOR: SCORE: 1996.68

## 2019-05-14 ASSESSMENT — PAIN SCALES - GENERAL: PAINLEVEL: NO PAIN (1)

## 2019-05-14 NOTE — PATIENT INSTRUCTIONS
Thank you for allowing Nieves Montano CNP and our ENT team to participate in your care.  If your medications are too expensive, please give the nurse a call.  We can possibly change this medication.  If you have a scheduling or an appointment question please contact [facility] Unit Coordinator at their direct line [phone]  ALL nursing questions or concerns can be directed to your ENT nurse at: [phone] - Adela    Stop Xyzal  Start Claritin  Continue Flonase    Matty Med sinus irrigations twice daily and as needed.     Budesonide nasal irrigations as needed with increased nasal congestion.    Complete allergy testing and I will see you after.    Return sooner as needed

## 2019-05-14 NOTE — NURSING NOTE
"Chief Complaint   Patient presents with     Follow Up     congetsion of paranasal sinus, mucous retention cyst of maxillary sinus, tension headaches       Initial /76   Pulse 88   Temp 97.6  F (36.4  C) (Tympanic)   Ht 1.918 m (6' 3.5\")   Wt 89.8 kg (198 lb)   SpO2 99%   BMI 24.42 kg/m   Estimated body mass index is 24.42 kg/m  as calculated from the following:    Height as of this encounter: 1.918 m (6' 3.5\").    Weight as of this encounter: 89.8 kg (198 lb).  Medication Reconciliation: complete    Adela Zuleta LPN  "

## 2019-05-14 NOTE — PROGRESS NOTES
"Otolaryngology Progress Note           Chief Complaint:     Patient presents with:  Follow Up: congetsion of paranasal sinus, mucous retention cyst of maxillary sinus, tension headaches         History of Present Illness:     Hema Rico is a 19 year old male, history of tension type headaches/AM headaches, sins congestion, seasonal allergic rhinitis, nasal turbinate hypertrophy, here to follow-up. Last appointment 3/13/19, nasal endoscopy showed mild septal irregularity, bilateral ITH, clear/white secretions from sphenoethmoid recess and draining into the nasopharynx with grade 1 adenoid tissue. Recent sinus CT showed incidental findings of mucosal retention cyst, and with no hx of chronic sinusitis, surgical options not recommended. Started him on Budesonide nasal irrigations BID, Xyzal and Flonase daily. Sleep referral ordered. Suspected more of tension headaches vs due to NED.    Today he reports he can breath a lot better following compliance with recommended medications. Has not been having any nasal congestion, sinus pain/pressure or nasal drainage. As far as his allergies, he is only experiencing intermittent itchy/watery eyes. He is happy with the improvement in his symptoms. He does want to switch back to his loratadine.    Ongoing morning headache starting at his forehead and wrapping around his head is unchanged. This is happening every day. Having a hard time waking up every morning more recently. Is feeling more fatigued.   No vision changes or nausea with headaches.  He did complete questionnaire for NED, but has not been contacted to set up appointment. .          Review of Systems:     ROS: See HPI         Physical Exam:     /76   Pulse 88   Temp 97.6  F (36.4  C) (Tympanic)   Ht 1.918 m (6' 3.5\")   Wt 89.8 kg (198 lb)   SpO2 99%   BMI 24.42 kg/m    General - The patient is well nourished and well developed, and appears to have good nutritional status.  Alert and oriented to person " and place, interactive.  Head and Face - Normocephalic and atraumatic, with no gross asymmetry noted of the contour of the facial features.  The facial nerve is intact, with strong symmetric movements.  Neck- No palpable lymphadenopathy or thyroid mass.  Trachea is midline.  Eyes - Extraocular movements intact.   Ears- External ears normal. Canals clear. Right tympanic membrane intact without effusion, worrisome retraction or mass. Left tympanic membrane intact without effusion, worrisome retraction or mass.    Nose - Nasal mucosa is pink and moist with no abnormal mucus.  The septum with mild irregularity, non-obstructive. Inferior turbinates winnie. No polyps, masses, or purulence noted on examination.  Mouth - Examination of the oral cavity shows pink, healthy, moist mucosa. No lesions or ulceration noted. The tongue is mobile and midline. FTP I.   Throat - The walls of the oropharynx were smooth, pink, moist, symmetric, and had no lesions or ulcerations.  The tonsillar pillars and soft palate were symmetric. Tonsils grade 2. The uvula was midline on elevation.      Sinus CT 3/6/19:  FINDINGS: There is a moderate-sized mucosal retention cyst at the  floor of the left maxillary sinus. There is a small mucosal retention  cyst at the floor of the right maxillary sinus. There is a small  mucosal retention cyst at the roof of the right maxillary sinus.  The ostiomeatal units are patent.  The sphenoid sinuses and ethmoid air cells and frontal sinuses are  clear.                                                               IMPRESSION: There is a moderate-sized mucosal retention cyst in the  left maxillary sinus and small because retention cyst within the right  maxillary sinus. The ostomy ligaments are patent and the paranasal  sinuses are otherwise clear.         Assessment and Plan:       ICD-10-CM    1. Seasonal allergic rhinitis, unspecified trigger J30.2 loratadine (CLARITIN) 10 MG tablet   2. Tension-type  headache, not intractable, unspecified chronicity pattern G44.209    3. Mucous retention cyst of maxillary sinus J34.1    4. Chronic congestion of paranasal sinus, currently resolved J32.9    5. Nasal turbinate hypertrophy, currently resolved J34.3      Stop Xyzal  Start Claritin  Continue Flonase    Matty Med sinus irrigations twice daily and as needed.     Can stop budesonide nasal irrigations at this time and use just as needed with increased nasal congestion.    Will look in to referral to sleep medicine so appropriate appointment for sleep evaluation/study can be made to rule out NED being cause of AM headaches and chronic fatigue.    Allergen avoidance measures were discussed and are important in preventing symptoms from occurring or worsening.  Follow up for allergy testing as recommended.  This may be a blood test (mRAST) or skin testing (modified quantitative testing).  Indications for allergy testing include:   1) Confirm suspicion of allergic rhinitis due to inhalant allergies  2) Identify the offending allergen to determine specific mode of treatment  3) In the case of chronic rhinosinusitis: when symptoms are not controlled by avoidance and pharmacotherapy  4) In the Asthma patient when exacerbations may be due to perennial allergen exposure  5) Suspect food allergy  6) Otitis Media, chronic rhinitis, atopic dermatitis, Meniere disease, headache, pharyngitis or eye symptoms    Signed consent was obtained, and the risks of immunotherapy were discussed, including the potential for anaphylaxis.      I will see him after allergy testing.    Encouraged him to return sooner as needed.    Nieves Montano NP  ENT  [facility], [place]  [phone]

## 2019-05-14 NOTE — LETTER
5/14/2019         RE: Hema Rico  [address] [facility] [address]        Dear Colleague,    Thank you for referring your patient, Hema Rico, to the [facility]. Please see a copy of my visit note below.    Otolaryngology Progress Note           Chief Complaint:     Patient presents with:  Follow Up: congetsion of paranasal sinus, mucous retention cyst of maxillary sinus, tension headaches         History of Present Illness:     Hema Rico is a 19 year old male, history of tension type headaches/AM headaches, sins congestion, seasonal allergic rhinitis, nasal turbinate hypertrophy, here to follow-up. Last appointment 3/13/19, nasal endoscopy showed mild septal irregularity, bilateral ITH, clear/white secretions from sphenoethmoid recess and draining into the nasopharynx with grade 1 adenoid tissue. Recent sinus CT showed incidental findings of mucosal retention cyst, and with no hx of chronic sinusitis, surgical options not recommended. Started him on Budesonide nasal irrigations BID, Xyzal and Flonase daily. Sleep referral ordered. Suspected more of tension headaches vs due to NED.    Today he reports he can breath a lot better following compliance with recommended medications. Has not been having any nasal congestion, sinus pain/pressure or nasal drainage. As far as his allergies, he is only experiencing intermittent itchy/watery eyes. He is happy with the improvement in his symptoms. He does want to switch back to his loratadine.    Ongoing morning headache starting at his forehead and wrapping around his head is unchanged. This is happening every day. Having a hard time waking up every morning more recently. Is feeling more fatigued.   No vision changes or nausea with headaches.  He did complete questionnaire for NED, but has not been contacted to set up appointment. .          Review of Systems:     ROS: See HPI         Physical Exam:     /76   Pulse 88    "Temp 97.6  F (36.4  C) (Tympanic)   Ht 1.918 m (6' 3.5\")   Wt 89.8 kg (198 lb)   SpO2 99%   BMI 24.42 kg/m     General - The patient is well nourished and well developed, and appears to have good nutritional status.  Alert and oriented to person and place, interactive.  Head and Face - Normocephalic and atraumatic, with no gross asymmetry noted of the contour of the facial features.  The facial nerve is intact, with strong symmetric movements.  Neck- No palpable lymphadenopathy or thyroid mass.  Trachea is midline.  Eyes - Extraocular movements intact.   Ears- External ears normal. Canals clear. Right tympanic membrane intact without effusion, worrisome retraction or mass. Left tympanic membrane intact without effusion, worrisome retraction or mass.    Nose - Nasal mucosa is pink and moist with no abnormal mucus.  The septum with mild irregularity, non-obstructive. Inferior turbinates winnie. No polyps, masses, or purulence noted on examination.  Mouth - Examination of the oral cavity shows pink, healthy, moist mucosa. No lesions or ulceration noted. The tongue is mobile and midline. FTP I.   Throat - The walls of the oropharynx were smooth, pink, moist, symmetric, and had no lesions or ulcerations.  The tonsillar pillars and soft palate were symmetric. Tonsils grade 2. The uvula was midline on elevation.      Sinus CT 3/6/19:  FINDINGS: There is a moderate-sized mucosal retention cyst at the  floor of the left maxillary sinus. There is a small mucosal retention  cyst at the floor of the right maxillary sinus. There is a small  mucosal retention cyst at the roof of the right maxillary sinus.  The ostiomeatal units are patent.  The sphenoid sinuses and ethmoid air cells and frontal sinuses are  clear.                                                               IMPRESSION: There is a moderate-sized mucosal retention cyst in the  left maxillary sinus and small because retention cyst within the right  maxillary " sinus. The ostomy ligaments are patent and the paranasal  sinuses are otherwise clear.         Assessment and Plan:       ICD-10-CM    1. Seasonal allergic rhinitis, unspecified trigger J30.2 loratadine (CLARITIN) 10 MG tablet   2. Tension-type headache, not intractable, unspecified chronicity pattern G44.209    3. Mucous retention cyst of maxillary sinus J34.1    4. Chronic congestion of paranasal sinus, currently resolved J32.9    5. Nasal turbinate hypertrophy, currently resolved J34.3      Stop Xyzal  Start Claritin  Continue Flonase    Matty Med sinus irrigations twice daily and as needed.     Can stop budesonide nasal irrigations at this time and use just as needed with increased nasal congestion.    Will look in to referral to sleep medicine so appropriate appointment for sleep evaluation/study can be made to rule out NED being cause of AM headaches and chronic fatigue.    Allergen avoidance measures were discussed and are important in preventing symptoms from occurring or worsening.  Follow up for allergy testing as recommended.  This may be a blood test (mRAST) or skin testing (modified quantitative testing).  Indications for allergy testing include:   1) Confirm suspicion of allergic rhinitis due to inhalant allergies  2) Identify the offending allergen to determine specific mode of treatment  3) In the case of chronic rhinosinusitis: when symptoms are not controlled by avoidance and pharmacotherapy  4) In the Asthma patient when exacerbations may be due to perennial allergen exposure  5) Suspect food allergy  6) Otitis Media, chronic rhinitis, atopic dermatitis, Meniere disease, headache, pharyngitis or eye symptoms    Signed consent was obtained, and the risks of immunotherapy were discussed, including the potential for anaphylaxis.      I will see him after allergy testing.    Encouraged him to return sooner as needed.    Nieves Montano NP  ENT  [facility], [place]  [phone]              Again,  thank you for allowing me to participate in the care of your patient.        Sincerely,        ZOHRA Pardo CNP

## 2019-05-15 ENCOUNTER — TELEPHONE (OUTPATIENT)
Dept: OTOLARYNGOLOGY | Facility: OTHER | Age: 19
End: 2019-05-15

## 2019-05-15 NOTE — TELEPHONE ENCOUNTER
I spoke with the patient today regarding allergy skin testing.    All general instructions are reviewed with the patient.      The patient is made aware of all medications that need to be held prior to testing, and will stop medications as directed per instructions.  The patient verbalized understanding and agree with plan.      Should the patient be given any additional medications prior to the day of testing, they are told to let the provider know that they are going to be allergy tested, so medications that need to be help prior to MQT are not prescribed.      An appointment will be arranged by the [facility] for testing date and time.     This message is forwarded to the [facility] to arrange for an appointment. Written instructions are mailed to the patient as well,  by the ENT [facility].  Emily Sanchez RN

## 2019-05-15 NOTE — TELEPHONE ENCOUNTER
I tried to reach [place] to review instructions for MQT allergy skin testing, and arrange a date for the appointment.  NA/LM to call.  Emily Sanchez RN

## 2019-06-06 PROBLEM — F17.200 TOBACCO USE DISORDER: Status: ACTIVE | Noted: 2019-06-06

## 2019-06-06 PROBLEM — R09.81 NASAL CONGESTION: Chronic | Status: ACTIVE | Noted: 2019-06-06

## 2019-06-06 PROBLEM — G44.219 EPISODIC TENSION-TYPE HEADACHE, NOT INTRACTABLE: Chronic | Status: ACTIVE | Noted: 2018-11-02

## 2019-06-06 NOTE — PROGRESS NOTES
Referred by ENT due to headaches and 'bad sleep'     Initial sleep questionnaire 6/6/2019 notable for no snoring, apneas. I episode of gasp arousal. No comorbidities    STOPBANG 1  ESS 3    Sleep schedule  Weekdays 11pm - 9am  Weekends 1am - 11am    Sleep ROS notable for  - Eats in bed, watches TV in bed, uses phone computer in bed  - Sleep onset difficulties  - Sleep walking    SpO2 Readings from Last 3 Encounters:   05/14/19 99%   04/05/19 98%   03/13/19 99%       Recent Labs   Lab Test 08/01/18  1210 11/25/14  1628    137   POTASSIUM 3.7 3.6   CHLORIDE 106 105   CO2 28 26   ANIONGAP 6 6   GLC 89 91   BUN 12 17   CR 0.63* 0.69   JOHN 8.4* 9.2       BMI Readings from Last 3 Encounters:   05/14/19 24.42 kg/m    04/05/19 24.42 kg/m    03/13/19 24.91 kg/m        10 point ROS negative with exceptions noted above and below.   Sore throat YES  Sinus pain YES  Post-nasal drip YES  Runny nose YES  Dry cough YES  Coughing up mucous or phlegm YES  Loose or watery stools YES  Hard, dry stools (constipation) YES  Increased thirst or urination YES      Headaches, little else to suggest obstructive sleep apnea  'Bad sleep'- unclear  Sleep onset difficulties with poor sleep hygiene, delayed sleep phase    Recommend clinic visit

## 2019-08-06 ENCOUNTER — OFFICE VISIT (OUTPATIENT)
Dept: SLEEP MEDICINE | Facility: HOSPITAL | Age: 19
End: 2019-08-06
Attending: INTERNAL MEDICINE
Payer: COMMERCIAL

## 2019-08-06 VITALS
HEIGHT: 75 IN | DIASTOLIC BLOOD PRESSURE: 70 MMHG | BODY MASS INDEX: 24.87 KG/M2 | WEIGHT: 200 LBS | OXYGEN SATURATION: 97 % | HEART RATE: 94 BPM | SYSTOLIC BLOOD PRESSURE: 128 MMHG | RESPIRATION RATE: 12 BRPM

## 2019-08-06 DIAGNOSIS — R53.83 FATIGUE, UNSPECIFIED TYPE: ICD-10-CM

## 2019-08-06 DIAGNOSIS — F51.04 PSYCHOPHYSIOLOGIC INSOMNIA: ICD-10-CM

## 2019-08-06 DIAGNOSIS — G44.209 TENSION-TYPE HEADACHE, NOT INTRACTABLE, UNSPECIFIED CHRONICITY PATTERN: Primary | ICD-10-CM

## 2019-08-06 PROBLEM — F41.1 GAD (GENERALIZED ANXIETY DISORDER): Chronic | Status: ACTIVE | Noted: 2019-03-06

## 2019-08-06 PROCEDURE — G0463 HOSPITAL OUTPT CLINIC VISIT: HCPCS

## 2019-08-06 PROCEDURE — 99213 OFFICE O/P EST LOW 20 MIN: CPT | Performed by: INTERNAL MEDICINE

## 2019-08-06 SDOH — HEALTH STABILITY: MENTAL HEALTH: HOW OFTEN DO YOU HAVE A DRINK CONTAINING ALCOHOL?: 2-4 TIMES A MONTH

## 2019-08-06 ASSESSMENT — MIFFLIN-ST. JEOR: SCORE: 1997.82

## 2019-08-06 NOTE — PROGRESS NOTES
Sleep Consultation:    Date on this visit: 8/6/2019    Hema Rico is sent by Pema Allison for a sleep consultation regarding Headaches.    Primary Physician: Pema Allison     Referred by ENT due to headaches and 'bad sleep' . He complains of difficulty concentrating and memory problems. He has unrefreshing sleep, morning headaches     Initial sleep questionnaire 6/6/2019 notable for no snoring, apneas. A few episode of gasp arousal, difficult nasal breathing. No comorbidities.     STOPBANG 1  ESS 3     Sleep schedule  Weekdays 11pm - 9am  Weekends 1am - 11am     Sleep ROS notable for  - Eats in bed, watches TV in bed, uses phone computer in bed  - Sleep onset difficulties for the past 6 months due to 'anxiety ramping up'. He has had problems for as long as he can remember. Cymbalta has helped anxity but not the sleep onset problems.   - Sleep walking     SpO2 Readings from Last 3 Encounters:  05/14/19 99%  04/05/19 98%  03/13/19 99%     Recent Labs  Lab Test 08/01/18 11/25/14  CO2 28 26     BMI Readings from Last 3 Encounters:  05/14/19 24.42 kg/m   04/05/19 24.42 kg/m   03/13/19 24.91 kg/m      10 point ROS negative with exceptions noted above and below.   Sore throat YES  Sinus pain YES  Post-nasal drip YES  Runny nose YES  Dry cough YES  Coughing up mucous or phlegm YES  Loose or watery stools YES  Hard, dry stools (constipation) YES  Increased thirst or urination YES     Lab Results   Component Value Date    TSH 2.59 08/01/2018           Allergies:    Allergies   Allergen Reactions     Sulfa Drugs        Medications:    Current Outpatient Medications   Medication Sig Dispense Refill     DULoxetine (CYMBALTA) 30 MG capsule Take 1 capsule (30 mg) by mouth daily 90 capsule 1     fluticasone (FLONASE) 50 MCG/ACT nasal spray Spray 2 sprays into both nostrils daily 16 g 11     loratadine (CLARITIN) 10 MG tablet Take 1 tablet (10 mg) by mouth daily 30 tablet 11     budesonide (PULMICORT) 0.5 MG/2ML neb  solution Squirt entire vial into previously made roz med saline bottle, mix, irrigate both nostrils until entire bottle empty. Do this twice daily. (Patient not taking: Reported on 8/6/2019) 2 Box 11     levocetirizine (XYZAL) 5 MG tablet Take 1 tablet (5 mg) by mouth every evening (Patient not taking: Reported on 8/6/2019) 30 tablet 11       Problem List:  Patient Active Problem List    Diagnosis Date Noted     Tobacco use disorder 06/06/2019     Priority: Medium     JENAE (generalized anxiety disorder) 03/06/2019     Priority: Medium     Episodic tension-type headache, not intractable 11/02/2018     Priority: Medium     Nasal congestion 06/06/2019     Priority: Low        Past Medical/Surgical History:  No past medical history on file.  Past Surgical History:   Procedure Laterality Date     NO HISTORY OF SURGERY         Social History:  Social History     Socioeconomic History     Marital status: Single     Spouse name: Not on file     Number of children: Not on file     Years of education: 11     Highest education level: Not on file   Occupational History     Occupation: IT      Comment: [employer]   Social Needs     Financial resource strain: Not on file     Food insecurity:     Worry: Not on file     Inability: Not on file     Transportation needs:     Medical: Not on file     Non-medical: Not on file   Tobacco Use     Smoking status: Former Smoker     Types: Dip, chew, snus or snuff, Other     Smokeless tobacco: Never Used     Tobacco comment: e cigarettes   Substance and Sexual Activity     Alcohol use: Yes     Frequency: 2-4 times a month     Drug use: No     Sexual activity: Not on file   Lifestyle     Physical activity:     Days per week: Not on file     Minutes per session: Not on file     Stress: Not on file   Relationships     Social connections:     Talks on phone: Not on file     Gets together: Not on file     Attends [employer] service: Not on file     Active member of club or organization: Not on  "file     Attends meetings of clubs or organizations: Not on file     Relationship status: Not on file     Intimate partner violence:     Fear of current or ex partner: Not on file     Emotionally abused: Not on file     Physically abused: Not on file     Forced sexual activity: Not on file   Other Topics Concern      Service No     Blood Transfusions Not Asked     Caffeine Concern No     Occupational Exposure Not Asked     Hobby Hazards Not Asked     Sleep Concern Not Asked     Stress Concern Not Asked     Weight Concern Not Asked     Special Diet Not Asked     Back Care Not Asked     Exercise No     Comment: no sports, plays video games     Bike Helmet Not Asked     Seat Belt Yes     Self-Exams Not Asked     Parent/sibling w/ CABG, MI or angioplasty before 65F 55M? Not Asked   Social History Narrative     Not on file       Family History:  Family History   Problem Relation Age of Onset     Asthma Mother      Anxiety Disorder Mother      Psychotic Disorder Father         panic attacks     Hypertension Father      Family History Negative Maternal Grandmother      Family History Negative Brother      Anxiety Disorder Brother         panic attack, drug abuse     Hypertension Maternal Grandfather      C.A.D. Maternal Grandfather      Family History Negative Paternal Grandmother      Family History Negative Paternal Grandfather      Gastrointestinal Disease Maternal Aunt         crohns     Heart Disease Other         family history Afib     Heart Disease Other         family history mitral valve     Thyroid Disease No family hx of          Physical Examination:  Vitals: /70   Pulse 94   Resp 12   Ht 6' 3\" (1.905 m)   Wt 200 lb (90.7 kg)   SpO2 97%   BMI 25.00 kg/m    BMI= Body mass index is 25 kg/m .     Colbert Total Score 6/6/2019   Total score - [place] 3        GENERAL APPEARANCE: healthy, alert and no distress  HENT: nose and mouth without ulcers or lesions  Mallampati Class: II.  Tonsillar " Stage: 2  visible at pillars.    Impression/Plan:    Headaches, difficult nasal breathing, fatigue (ESS 3) little else to suggest obstructive sleep apnea  - Recommend Polysomnogram (using 4% desaturation/Medicare/2012 AASM 1B scoring rules) for possible obstructive sleep apnea.    - Insurance ([employer]) does not cover Polysomnogram, changed to an Home Sleep ApneaTest     Primary problem appears to be Psychophysiologic insomnia comorbid to anxiety with sleep onset difficulties   - We discussed stimulus control, regular wake times, later bed times  -Read the book Say Good Night To Insomnia       I spent 30 minutes with patient. More than 1/2 this time spent counseling on above issues     Literature provided regarding sleep apnea.      He will follow up with me in approximately two weeks after his sleep study has been competed to review the results and discuss plan of care.       Arnold Robb     CC: Pema Allison

## 2019-08-06 NOTE — NURSING NOTE
Patient ID checked with name and date of birth. Reviewed allergies and home medications. Took Vitals and brief medical history.     Scheduled split night test  And reviewed instructions he had good understanding

## 2019-08-06 NOTE — PATIENT INSTRUCTIONS
Read the book Say Good Night To Insomnia     In order to help your stay at the Sleep Center to be as comfortable as possible and to obtain the best sleep study possible, the Sleep Center Staff has established the following guidelines:    1.  Please attempt to be here 15 minutes prior to your scheduled appointment time.  If you anticipate being late or you cannot make your overnight appointment, please call us at [phone] or call [phone] and ask for the Sleep Center.  PLEASE MAKE EVERY ATTEMPT TO MAKE YOUR APPOINTMENT.  A SLEEP TECHNICIAN AND A SLEEP ROOM HAS BEEN RESERVED JUST FOR YOU.    2. When you arrive at [facility], please park in the upper lot, by [address].  Enter the hospital at the Emergency Room Entrance.  Follow the signs to the Emergency Room Admitting Desk and tell them you are here for a sleep study in the Sleep Disorder Center.    3. Do not stop any medications, unless specifically requested.  Be sure to bring all medications that you need with you.    4. Do not use any hair creams or gels, moisturizers, rinses or sprays the day of your study.  FOR MALES:  if you are usually clean shaven, please shave before you come in; FOR FEMALES:  do not wear make-up or be prepared to remove it.  This will improve the quality of the study.    5. Please DO NOT USE CAFFEINE OR ALCOHOL after 2:00 PM the day of your test unless advised otherwise.    6. Do not take any naps the day of your test.    7. Attachment of monitoring equipment will take approximately one hour, including an explanation of the test.    8. Bring comfortable night clothes to sleep in, two-piece, cotton pajamas or shorts are best.    9. If you have a pillow that you prefer using, please bring it with you; but do not forget to take it home with you in the morning.    10. Most of our studies are complete by approximately 7:00 AM.  In most instances we may wake you during your normal wake time or the time you request to be  awakened.    If you have any special needs or questions related to this information or your test, please feel free to call the Sleep Disorder Center at [phone] or [phone] and ask for the Sleep Disorder Center.  Please make every effort to keep your appointment.  A sleep technician and a sleep room have been reserved just for you.  In the event that you are unable to make your appointment, please call as soon as possible to notify us of your cancellation.

## 2019-08-27 ENCOUNTER — TELEPHONE (OUTPATIENT)
Dept: SLEEP MEDICINE | Facility: HOSPITAL | Age: 19
End: 2019-08-27

## 2019-08-27 NOTE — TELEPHONE ENCOUNTER
Changed to an Home Sleep ApneaTest for insurance reasons  If negative will recommend Polysomnogram

## 2019-09-05 ENCOUNTER — NURSE TRIAGE (OUTPATIENT)
Dept: FAMILY MEDICINE | Facility: OTHER | Age: 19
End: 2019-09-05

## 2019-09-05 ENCOUNTER — DOCUMENTATION ONLY (OUTPATIENT)
Dept: SLEEP MEDICINE | Facility: HOSPITAL | Age: 19
End: 2019-09-05
Attending: INTERNAL MEDICINE
Payer: COMMERCIAL

## 2019-09-05 DIAGNOSIS — G44.209 TENSION-TYPE HEADACHE, NOT INTRACTABLE, UNSPECIFIED CHRONICITY PATTERN: ICD-10-CM

## 2019-09-05 DIAGNOSIS — R53.83 FATIGUE, UNSPECIFIED TYPE: ICD-10-CM

## 2019-09-05 DIAGNOSIS — F51.04 PSYCHOPHYSIOLOGIC INSOMNIA: ICD-10-CM

## 2019-09-05 PROCEDURE — G0399 HOME SLEEP TEST/TYPE 3 PORTA: HCPCS

## 2019-09-05 PROCEDURE — G0399 HOME SLEEP TEST/TYPE 3 PORTA: HCPCS | Mod: 26

## 2019-09-05 NOTE — TELEPHONE ENCOUNTER
Patient called and stated he has been having ongoing symptoms for the last week and a half of a productive cough, congestion and chest burning sensation. Patient based on protocol was scheduled an appointment. Patient advised of care advise and to call back or go to UC/ER if after hours with any new or worsening symptoms. Patient stated understanding.     Reason for Disposition    [1] Nasal discharge AND [2] present > 10 days    Additional Information    Negative: Bluish (or gray) lips or face now    Negative: Severe difficulty breathing (e.g., struggling for each breath, speaks in single words)    Negative: [1] Difficulty breathing AND [2] exposure to flames, smoke, or fumes    Negative: [1] Stridor AND [2] difficulty breathing    Negative: Sounds like a life-threatening emergency to the triager    Negative: [1] Previous asthma attacks AND [2] this feels like asthma attack    Negative: Dry (non-productive) cough (i.e., no sputum or minimal clear sputum)    Negative: Chest pain  (Exception: MILD central chest pain, present only when coughing)    Negative: Difficulty breathing    Negative: Patient sounds very sick or weak to the triager    Negative: [1] Coughed up blood AND [2] > 1 tablespoon (15 ml) (Exception: blood-tinged sputum)    Negative: Fever > 103 F (39.4 C)    Negative: [1] Fever > 101 F (38.3 C) AND [2] age > 60    Negative: [1] Fever > 100.0 F (37.8 C) AND [2] bedridden (e.g., nursing home patient, CVA, chronic illness, recovering from surgery)    Negative: [1] Fever > 100.0 F (37.8 C) AND [2] diabetes mellitus or weak immune system (e.g., HIV positive, cancer chemo, splenectomy, chronic steroids)    Negative: Wheezing is present    Negative: SEVERE coughing spells (e.g., whooping sound after coughing, vomiting after coughing)    Negative: [1] Continuous (nonstop) coughing interferes with work or school AND [2] no improvement using cough treatment per Care Advice    Negative: Coughing up stephan-colored  "(reddish-brown) sputum    Negative: Fever present > 3 days (72 hours)    Negative: [1] Fever returns after gone for over 24 hours AND [2] symptoms worse or not improved    Negative: [1] Using nasal washes and pain medicine > 24 hours AND [2] sinus pain (around cheekbone or eye) persists    Negative: Earache    Negative: [1] Known COPD or other severe lung disease (i.e., bronchiectasis, cystic fibrosis, lung surgery) AND [2] worsening symptoms (i.e., increased sputum purulence or amount, increased breathing difficulty    Negative: Cough has been present for > 3 weeks    Answer Assessment - Initial Assessment Questions  1. ONSET: \"When did the cough begin?\"       Started about a week and a half ago  2. SEVERITY: \"How bad is the cough today?\"       mild  3. RESPIRATORY DISTRESS: \"Describe your breathing.\"       When wake up and first couple hours in the morning, has burning feeling in chest area  4. FEVER: \"Do you have a fever?\" If so, ask: \"What is your temperature, how was it measured, and when did it start?\"      Unknown, has not taken temp  5. SPUTUM: \"Describe the color of your sputum\" (clear, white, yellow, green)      White or yellow  6. HEMOPTYSIS: \"Are you coughing up any blood?\" If so ask: \"How much?\" (flecks, streaks, tablespoons, etc.)      no  7. CARDIAC HISTORY: \"Do you have any history of heart disease?\" (e.g., heart attack, congestive heart failure)       no  8. LUNG HISTORY: \"Do you have any history of lung disease?\"  (e.g., pulmonary embolus, asthma, emphysema)      no  9. PE RISK FACTORS: \"Do you have a history of blood clots?\" (or: recent major surgery, recent prolonged travel, bedridden )      no  10. OTHER SYMPTOMS: \"Do you have any other symptoms?\" (e.g., runny nose, wheezing, chest pain)        Runny nose, chest pain/burning in the morning and after coughing  11. PREGNANCY: \"Is there any chance you are pregnant?\" \"When was your last menstrual period?\"        no  12. TRAVEL: \"Have you traveled " "out of the country in the last month?\" (e.g., travel history, exposures)        no    Protocols used: COUGH - ACUTE [id number]      "

## 2019-09-09 ENCOUNTER — OFFICE VISIT (OUTPATIENT)
Dept: FAMILY MEDICINE | Facility: OTHER | Age: 19
End: 2019-09-09
Attending: FAMILY MEDICINE
Payer: COMMERCIAL

## 2019-09-09 VITALS
HEART RATE: 68 BPM | TEMPERATURE: 97.8 F | SYSTOLIC BLOOD PRESSURE: 118 MMHG | DIASTOLIC BLOOD PRESSURE: 64 MMHG | OXYGEN SATURATION: 98 % | BODY MASS INDEX: 25.49 KG/M2 | HEIGHT: 75 IN | WEIGHT: 205 LBS

## 2019-09-09 DIAGNOSIS — J06.9 VIRAL UPPER RESPIRATORY TRACT INFECTION: Primary | ICD-10-CM

## 2019-09-09 PROCEDURE — 99213 OFFICE O/P EST LOW 20 MIN: CPT | Performed by: FAMILY MEDICINE

## 2019-09-09 ASSESSMENT — PAIN SCALES - GENERAL: PAINLEVEL: NO PAIN (0)

## 2019-09-09 ASSESSMENT — MIFFLIN-ST. JEOR: SCORE: 2020.5

## 2019-09-09 NOTE — PROGRESS NOTES
This HSAT was performed using a Noxturnal T3 device which recorded snore, sound, movement activity, body position, nasal pressure, oronasal thermal airflow, pulse, oximetry and both chest and abdominal respiratory effort. HSAT data was restricted to the time patient states they were in bed.     HSAT was scored using 1B 4% hypopnea rule.     HST AHI (Non-PAT): 7.6  Snoring was reported as mild.  Time with SpO2 below 89% was 4 minutes.   Overall signal quality was fair     Pt will follow up with sleep provider to determine appropriate therapy. Patient states he did not have any trouble with equipment however it looks like th oximeter did not work when he was on his left side. everything looked good the rest of the night and his snoring was mild the events he had looked to all happen supine he did say he woke gasping a couple times.

## 2019-09-09 NOTE — NURSING NOTE
"Chief Complaint   Patient presents with     URI       Initial /64   Pulse 68   Temp 97.8  F (36.6  C)   Ht 1.905 m (6' 3\")   Wt 93 kg (205 lb)   SpO2 98%   BMI 25.62 kg/m   Estimated body mass index is 25.62 kg/m  as calculated from the following:    Height as of this encounter: 1.905 m (6' 3\").    Weight as of this encounter: 93 kg (205 lb).  Medication Reconciliation: complete  "

## 2019-09-09 NOTE — PROGRESS NOTES
"Subjective     Hema Rioc is a 19 year old male who presents to clinic today for the following health issues:    HPI   RESPIRATORY SYMPTOMS      Duration: 1 week    Description  nasal congestion, rhinorrhea, facial pain/pressure, cough, headache and hoarse voice    Severity: moderate    Accompanying signs and symptoms: None    History (predisposing factors):  none    Precipitating or alleviating factors: None    Therapies tried and outcome:  Antihistamine    Has lots of allergies    Has some h/o sinus issues     Some increase sinus sx    Getting some better                  Reviewed and updated as needed this visit by Provider         Review of Systems   ROS COMP: CONSTITUTIONAL: NEGATIVE for fever, chills, change in weight  CV: NEGATIVE for chest pain, palpitations or peripheral edema      Objective    /64   Pulse 68   Temp 97.8  F (36.6  C)   Ht 1.905 m (6' 3\")   Wt 93 kg (205 lb)   SpO2 98%   BMI 25.62 kg/m    Body mass index is 25.62 kg/m .  Physical Exam   GENERAL: healthy, alert and no distress  HENT: ear canals and TM's normal, nose and mouth without ulcers or lesions  NECK: no adenopathy, no asymmetry, masses, or scars and thyroid normal to palpation  RESP: lungs clear to auscultation - no rales, rhonchi or wheezes  CV: regular rate and rhythm, normal S1 S2, no S3 or S4, no murmur, click or rub, no peripheral edema and peripheral pulses strong            Assessment & Plan       ICD-10-CM    1. Viral upper respiratory tract infection J06.9    vs seasonal allergies. Discussed. He says he is improving. Will watch. Symptomatic treatment was discussed along what is available for OTC medications for symptomatic relief.   Symptomatic treatment was discussed along when patient should call and/or come back into the clinic or go to ER/Urgent care. All questions answered.        BMI:   Estimated body mass index is 25.62 kg/m  as calculated from the following:    Height as of this encounter: 1.905 m " "(6' 3\").    Weight as of this encounter: 93 kg (205 lb).               No follow-ups on file.    Philippe Duran MD  [facility] [place]      "

## 2019-09-11 NOTE — PROCEDURES
"HOME SLEEP STUDY INTERPRETATION    Patient: Hema Rico  MRN: [id number]  YOB: 2000  Study Date: 9/5/2019  Referring Provider: Pema Allison  Ordering Provider: Arnold Robb MD     Indications for Home Study: Hema Rico is a 19 year old male with symptoms suggestive of obstructive sleep apnea.    Estimated body mass index is 25.62 kg/m  as calculated from the following:    Height as of 9/9/19: 1.905 m (6' 3\").    Weight as of 9/9/19: 93 kg (205 lb).  Total score - [place]: 3 (6/6/2019  4:00 PM)  StopBang Total Score: 1 (6/6/2019  4:00 PM)    Data: A full night home sleep study was performed recording the standard physiologic parameters including body position, movement, sound, nasal pressure, thermal oral airflow, chest and abdominal movements with respiratory inductance plethysmography, and oxygen saturation by pulse oximetry. Pulse rate was estimated by oximetry recording. This study was considered adequate based on > 4 hours of quality oximetry and respiratory recording. As specified by the AASM Manual for the Scoring of Sleep and Associated events, version 2.3, Rule VIII.D 1B, 4% oxygen desaturation scoring for hypopneas is used as a standard of care on all home sleep apnea testing.    Analysis Time:  434 minutes    Respiration:   Sleep Associated Hypoxemia: sustained hypoxemia was not present. Baseline oxygen saturation was 96%.  Time with saturation less than or equal to 88% was 4 minutes. The lowest oxygen saturation was 89%.   Snoring: Snoring was not heard.  Respiratory events: The home study revealed a presence of 18 obstructive apneas and 34 mixed and central apneas. There were 3 hypopneas resulting in a combined obstructive apnea/hypopnea index [AHI] of 2 .9 events per hour and a mixed/central index of 4.7 events per hour. .  AHI was 17.9 per hour supine, 20.9 per hour prone, 16.2 per hour on left side, and 2.1 per hour on right side.   Pattern: Excluding events noted above, " respiratory rate and pattern was mild tachpnea (RR 18-22)    Position: Percent of time spent: supine - 22%, prone - 1%, on left - 12%, on right - 64%.    Heart Rate: By pulse oximetry normal rate was noted.     Assessment:   No evidence of severe obstructive sleep apnea.  Sleep associated hypoxemia was not present.    Recommendations:  Consider attended polysomnogram if clinical suspicion of NED is moderate or high.  Suggest optimizing sleep hygiene and avoiding sleep deprivation.  Weight management.    Diagnosis Code(s): keanu Robb MD, September 11, 2019   Diplomate, American Board of Internal Medicine, Sleep Medicine

## 2019-10-01 ENCOUNTER — OFFICE VISIT (OUTPATIENT)
Dept: SLEEP MEDICINE | Facility: HOSPITAL | Age: 19
End: 2019-10-01
Attending: INTERNAL MEDICINE
Payer: COMMERCIAL

## 2019-10-01 VITALS
BODY MASS INDEX: 26.11 KG/M2 | OXYGEN SATURATION: 98 % | SYSTOLIC BLOOD PRESSURE: 116 MMHG | WEIGHT: 210 LBS | DIASTOLIC BLOOD PRESSURE: 64 MMHG | RESPIRATION RATE: 12 BRPM | HEIGHT: 75 IN | HEART RATE: 64 BPM

## 2019-10-01 DIAGNOSIS — R53.81 MALAISE AND FATIGUE: ICD-10-CM

## 2019-10-01 DIAGNOSIS — R06.89 DYSPNEA AND RESPIRATORY ABNORMALITY: ICD-10-CM

## 2019-10-01 DIAGNOSIS — R53.83 MALAISE AND FATIGUE: ICD-10-CM

## 2019-10-01 DIAGNOSIS — F51.04 PSYCHOPHYSIOLOGIC INSOMNIA: Primary | ICD-10-CM

## 2019-10-01 DIAGNOSIS — G47.9 DISTURBANCE IN SLEEP BEHAVIOR: ICD-10-CM

## 2019-10-01 DIAGNOSIS — R06.00 DYSPNEA AND RESPIRATORY ABNORMALITY: ICD-10-CM

## 2019-10-01 PROCEDURE — 99213 OFFICE O/P EST LOW 20 MIN: CPT | Performed by: INTERNAL MEDICINE

## 2019-10-01 PROCEDURE — G0463 HOSPITAL OUTPT CLINIC VISIT: HCPCS

## 2019-10-01 RX ORDER — ZOLPIDEM TARTRATE 5 MG/1
TABLET ORAL
Qty: 1 TABLET | Refills: 0 | Status: SHIPPED | OUTPATIENT
Start: 2019-10-01 | End: 2020-05-10

## 2019-10-01 ASSESSMENT — MIFFLIN-ST. JEOR: SCORE: 2043.18

## 2019-10-01 NOTE — PATIENT INSTRUCTIONS
In order to help your stay at the Sleep Center to be as comfortable as possible and to obtain the best sleep study possible, the Sleep Center Staff has established the following guidelines:    1.  Please attempt to be here 15 minutes prior to your scheduled appointment time.  If you anticipate being late or you cannot make your overnight appointment, please call us at [phone] or call [phone] and ask for the Sleep Center.  PLEASE MAKE EVERY ATTEMPT TO MAKE YOUR APPOINTMENT.  A SLEEP TECHNICIAN AND A SLEEP ROOM HAS BEEN RESERVED JUST FOR YOU.    2. When you arrive at [facility], please park in the upper lot, by [address].  Enter the hospital at the Emergency Room Entrance.  Follow the signs to the Emergency Room Admitting Desk and tell them you are here for a sleep study in the [facility].    3. Do not stop any medications, unless specifically requested.  Be sure to bring all medications that you need with you.    4. Do not use any hair creams or gels, moisturizers, rinses or sprays the day of your study.  FOR MALES:  if you are usually clean shaven, please shave before you come in; FOR FEMALES:  do not wear make-up or be prepared to remove it.  This will improve the quality of the study.    5. Please DO NOT USE CAFFEINE OR ALCOHOL after 2:00 PM the day of your test unless advised otherwise.    6. Do not take any naps the day of your test.    7. Attachment of monitoring equipment will take approximately one hour, including an explanation of the test.    8. Bring comfortable night clothes to sleep in, two-piece, cotton pajamas or shorts are best.    9. If you have a pillow that you prefer using, please bring it with you; but do not forget to take it home with you in the morning.    10. Most of our studies are complete by approximately 7:00 AM.  In most instances we may wake you during your normal wake time or the time you request to be awakened.    If you have any special needs or  questions related to this information or your test, please feel free to call the Sleep Disorder Center at [phone] or [phone] and ask for the Sleep Disorder Center.  Please make every effort to keep your appointment.  A sleep technician and a sleep room have been reserved just for you.  In the event that you are unable to make your appointment, please call as soon as possible to notify us of your cancellation.

## 2019-10-01 NOTE — PROGRESS NOTES
"Home Sleep Apnea Testing Results Visit:    Chief Complaint   Patient presents with     Sleep Problem     follow up for test results       Hema Rico is a 19 year old male who returns to [facility] after having had Home Sleep Apnea Testing.      Referred by ENT due to headaches and 'bad sleep'. He has unrefreshing sleep, morning headaches     Initial sleep questionnaire 6/6/2019 notable for no snoring, apneas. A few episode of gasp arousal, difficult nasal breathing. No comorbidities  -  Sleep onset difficulties     At initial sleep visit 8/6/19 primary problem appeared to be Psychophysiologic insomnia comorbid to anxiety with sleep onset difficulties.     A polysomnogram was ordered to rule out sleep disordered breathing, but was not covered by insurance,     Home Study Date: 9/5/2019 (205 lb)  - The study revealed a presence of 18 obstructive apneas and 34 mixed and central apneas. There were 3 hypopneas resulting in a combined obstructive apnea/hypopnea index [AHI] of 2.9 events per hour and a mixed/central index of 4.7 events per hour. AHI was 17.9 per hour supine, 20.9 per hour prone, 16.2 per hour on left side, and 2.1 per hour on right side.   - The lowest oxygen saturation was 89%.   - Mild tachpnea (RR 18-22)  -Percent of time spent: supine - 22%, prone - 1%, on left - 12%, on right - 64%.        Home Sleep Apnea Testing was reviewed in detail today with Hema and a copy given to him for his records.    Past medical/surgical history, family history, social history, medications and allergies were reviewed.      Current Outpatient Medications   Medication Sig Dispense Refill     zolpidem (AMBIEN) 5 MG tablet Take tablet by mouth 15 minutes prior to sleep, for Sleep Study 1 tablet 0     loratadine (CLARITIN) 10 MG tablet Take 1 tablet (10 mg) by mouth daily 30 tablet 11         /64   Pulse 64   Resp 12   Ht 6' 3\" (1.905 m)   Wt 210 lb (95.3 kg)   SpO2 98%   BMI 26.25 kg/m  "     Impression/Plan:    No evidence of severe obstructive sleep apnea.on HSAT.   AHI was elevated at 7  but he had an elevated number of central an mixed apneas.  HSAT has a false negative rate of 20%. Recommend Polysomnogram (using 4% desaturation/Medicare/2012 AASM 1B scoring rules) for modest probability obstructive sleep apnea.  AMbien if needed.     Psychophysiologic insomnia  We reviewed stimulus control and sleep restriction again           25 minutes spent with patient with >50% spent in counseling and coordination of care.

## 2019-10-01 NOTE — NURSING NOTE
Patient ID checked with name and date of birth. Reviewed allergies and home medications. Took Vitals and brief medical history.     Scheduled an overnight sleep test and reviewed instructions he had good understanding

## 2020-03-26 ENCOUNTER — VIRTUAL VISIT (OUTPATIENT)
Dept: FAMILY MEDICINE | Facility: OTHER | Age: 20
End: 2020-03-26

## 2020-03-26 NOTE — PROGRESS NOTES
"Date: 2020 10:49:48  Clinician: April Garcia  Clinician NPI: [id number]  Patient: Hema Rico  Patient : 2000  Patient Address: [address]  [place] [address]  Patient Phone: [phone]  Visit Protocol: URI  Patient Summary:  Hema is a 20 year old ( : 2000 ) male who initiated a Visit for COVID-19 (Coronavirus) evaluation and screening. When asked the question \"Please sign me up to receive news, health information and promotions. \", Hema responded \"Yes\".    Hema states his symptoms started gradually 3-6 days ago. After his symptoms started, they improved and then got worse again.   His symptoms consist of a cough, nasal congestion, and facial pain or pressure. He is experiencing difficulty breathing due to nasal congestion but he is not short of breath.   Symptom details     Nasal secretions: The color of his mucus is clear.    Cough: Hema coughs a few times an hour and his cough is not more bothersome at night. Phlegm does not come into his throat when he coughs. He believes his cough is caused by post-nasal drip.     Facial pain or pressure: The facial pain or pressure feels worse when bending over or leaning forward.      Hema denies having ear pain, rhinitis, wheezing, sore throat, malaise, enlarged lymph nodes, teeth pain, headache, fever, myalgias, and chills. He also denies taking antibiotic medication for the symptoms, having recent facial or sinus surgery in the past 60 days, and having a sinus infection within the past year.   Precipitating events  He has not recently been exposed to someone with influenza. Hema has not been in close contact with any high risk individuals.   Pertinent COVID-19 (Coronavirus) information  Hema has not traveled internationally or to the areas where COVID-19 (Coronavirus) is widespread, including cruise ship travel in the last 14 days before the start of his symptoms.   Hema has not had a close contact with a " laboratory-confirmed COVID-19 patient within 14 days of symptom onset. He also has not had a close contact with a suspected COVID-19 patient within 14 days of symptom onset.   Hema is not a healthcare worker or a  and does not work in a healthcare facility. He does not live with a healthcare worker.   Pertinent medical history  Hema does not need a return to work/school note.   Weight: 200 lbs   Hema does not smoke or use smokeless tobacco.   Additional information as reported by the patient (free text): Sleeping issues   Weight: 200 lbs    MEDICATIONS: No current medications, ALLERGIES: Sulfa (Sulfonamide Antibiotics)  Clinician Response:  Dear Hema,   Based on the information you have provided, you do have symptoms that are consistent with Coronavirus (COVID-19).  The coronavirus causes mild to severe respiratory illness with the most common symptoms including fever, cough and difficulty breathing. Unfortunately, many viruses cause similar symptoms and it can be difficult to distinguish between viruses, especially in mild cases, so we are presuming that anyone with cough or fever has coronavirus at this time.  Coronavirus/COVID-19 has reached the point of community spread in [place], meaning that we are finding the virus in people with no known exposure risk for quynh the virus. Given the increasing commonness of coronavirus in the community we are no longer testing patients who are not critically ill.  If you are a health care worker, you should refer to your employee health office for instructions about testing and returning to work.  For everyone else who has cough or fever, you should assume you are infected with coronavirus. Since you will not be tested but have symptoms that may be consistent with coronavirus, the CDC recommends you stay in self-isolation until these three things have happened:    You have had no fever for at least 72 hours (that is three full days of no fever  without the use of medicine that reduces fevers)    AND   Other symptoms have improved (for example, when your cough or shortness of breath have improved)   AND   At least 7 days have passed since your symptoms first appeared.   How to Isolate:   Isolate yourself at home.  Do Not allow any visitors  Do Not go to work or school  Do Not go to [place],  centers, shopping, or other public places.  Do Not shake hands.  Avoid close contact with others (hugging, kissing).   Protect Others:   Cover Your Mouth and Nose with a mask, disposable tissue or wash cloth to avoid spreading germs to others.  Wash your hands and face frequently with soap and water.   We know it can be scary to hear that you might have COVID-19. Our team can help track your symptoms and make sure you are doing ok over the next two weeks using a program called [employer] to keep in touch. When you receive an email from [employer], please consider enrolling in our monitoring program. There is no cost to you for monitoring. Here is a URL where you can learn more: [email or web address]  Managing Symptoms:   At this time, we primarily recommend Tylenol (Acetaminophen) for fever or pain. If you have liver or kidney problems, contact your primary care provider for instructions on use of tylenol. Adults can take 650 mg (two 325 mg pills) by mouth every 4-6 hours as needed OR 1,000 mg (two 500 mg pills) every 8 hours as needed. MAXIMUM DAILY DOSE: 3,000mg. For children, refer to dosing on bottle based on age or weight.   If you develop significant shortness of breath that prevents you from doing normal activities, please call 911 or proceed to the nearest emergency room and alert them immediately that you have been in self-isolation for possible coronavirus.  For more information about COVID19 and options for caring for yourself at home, please visit the CDC website at https://www.cdc.gov/coronavirus/2019-ncov/about/steps-when-sick.htmlFor  more options for care at [facility], please visit our website at [email or web address]    Diagnosis: Cough  Diagnosis ICD: R05

## 2020-03-26 NOTE — PROGRESS NOTES
"Date: 2020 11:39:06  Clinician: Manpreet Vivar  Clinician NPI: [id number]  Patient: Hema Rico  Patient : 2000  Patient Address: [address]  [place] [address]  Patient Phone: [phone]  Visit Protocol: URI  Patient Summary:  Hema is a 20 year old ( : 2000 ) male who initiated a Visit for cold, sinus infection, or influenza. When asked the question \"Please sign me up to receive news, health information and promotions. \", Hema responded \"Yes\".    Hema states his symptoms started suddenly 3-6 days ago.   His symptoms consist of nasal congestion, a headache, and facial pain or pressure. He is experiencing difficulty breathing due to nasal congestion but he is not short of breath.   Symptom details     Nasal secretions: The color of his mucus is clear.    Facial pain or pressure: The facial pain or pressure feels worse when bending over or leaning forward.     Headache: He states the headache is mild (1-3 on a 10 point pain scale).      Hema denies having ear pain, rhinitis, wheezing, sore throat, cough, malaise, enlarged lymph nodes, teeth pain, fever, myalgias, and chills. He also denies taking antibiotic medication for the symptoms, having recent facial or sinus surgery in the past 60 days, and double sickening (worsening symptoms after initial improvement).    Pertinent COVID-19 (Coronavirus) information  Hema has not traveled internationally or to the areas where COVID-19 (Coronavirus) is widespread, including cruise ship travel in the last 14 days before the start of his symptoms.   Hema has not had a close contact with a laboratory-confirmed COVID-19 patient within 14 days of symptom onset. He also has not had a close contact with a suspected COVID-19 patient within 14 days of symptom onset.   Hema is not a healthcare worker or a  and does not work in a healthcare facility. He does not live with a healthcare worker.   Pertinent medical history  Hema " does not need a return to work/school note.   Weight: 200 lbs   Hema does not smoke or use smokeless tobacco.   Additional information as reported by the patient (free text): Nasal drip trouble sleeping   Weight: 200 lbs    MEDICATIONS: No current medications, ALLERGIES: Sulfa (Sulfonamide Antibiotics)  Clinician Response:  Dear MARIA DEL CARMEN Garrido. &nbsp;I see you completed an [facility] visit previously and noted a cough, agree with the previous provider that you should follow the instructions below regarding Coronavirus.  Additionally it sounds like you have acute upper respiratory symptoms. &nbsp;Recommend the following to help treat those symptoms:  Flonase 2 sprays each nostril once daily. &nbsp;  Nasal saline rinses - Nettipot/Neilmed Nasal Rinse  Decongestants - Mucinex DM, Sudafed  Based on the information you have provided, you do have symptoms that are consistent with Coronavirus (COVID-19).  The coronavirus causes mild to severe respiratory illness with the most common symptoms including fever, cough and difficulty breathing. Unfortunately, many viruses cause similar symptoms and it can be difficult to distinguish between viruses, especially in mild cases, so we are presuming that anyone with cough or fever has coronavirus at this time.  Coronavirus/COVID-19 has reached the point of community spread in [place], meaning that we are finding the virus in people with no known exposure risk for quynh the virus. Given the increasing commonness of coronavirus in the community we are no longer testing patients who are not critically ill.  If you are a health care worker, you should refer to your employee health office for instructions about testing and returning to work.  For everyone else who has cough or fever, you should assume you are infected with coronavirus. Since you will not be tested but have symptoms that may be consistent with coronavirus, the CDC recommends you stay in self-isolation until  these three things have happened:    You have had no fever for at least 72 hours (that is three full days of no fever without the use of medicine that reduces fevers)    AND   Other symptoms have improved (for example, when your cough or shortness of breath have improved)   AND   At least 7 days have passed since your symptoms first appeared.   How to Isolate:   Isolate yourself at home.  Do Not allow any visitors  Do Not go to work or school  Do Not go to [employer],  centers, shopping, or other public places.  Do Not shake hands.  Avoid close contact with others (hugging, kissing).   Protect Others:   Cover Your Mouth and Nose with a mask, disposable tissue or wash cloth to avoid spreading germs to others.  Wash your hands and face frequently with soap and water.   We know it can be scary to hear that you might have COVID-19. Our team can help track your symptoms and make sure you are doing ok over the next two weeks using a program called [employer] to keep in touch. When you receive an email from [employer], please consider enrolling in our monitoring program. There is no cost to you for monitoring. Here is a URL where you can learn more: [email or web address]  Managing Symptoms:   At this time, we primarily recommend Tylenol (Acetaminophen) for fever or pain. If you have liver or kidney problems, contact your primary care provider for instructions on use of tylenol. Adults can take 650 mg (two 325 mg pills) by mouth every 4-6 hours as needed OR 1,000 mg (two 500 mg pills) every 8 hours as needed. MAXIMUM DAILY DOSE: 3,000mg. For children, refer to dosing on bottle based on age or weight.   If you develop significant shortness of breath that prevents you from doing normal activities, please call 911 or proceed to the nearest emergency room and alert them immediately that you have been in self-isolation for possible coronavirus.  For more information about COVID19 and options for caring for  yourself at home, please visit the CDC website at https://www.cdc.gov/coronavirus/2019-ncov/about/steps-when-sick.htmlFor more options for care at [facility], please visit our website at [email or web address]    Diagnosis: Acute upper respiratory infection, unspecified  Diagnosis ICD: J06.9  Prescription: fluticasone propionate (Flonase Allergy Relief) 50 mcg/actuation nasal spray,suspension 1 60 spray aerosol with adapter, 0 days supply. Inhale 2 sprays in each nostril intranasally 1 time per day as needed. Refills: 0, Refill as needed: no, Allow substitutions: yes  Pharmacy: [id number] - [phone] - [id number] [address], [place] [id number]

## 2020-04-06 ENCOUNTER — VIRTUAL VISIT (OUTPATIENT)
Dept: FAMILY MEDICINE | Facility: OTHER | Age: 20
End: 2020-04-06

## 2020-04-06 NOTE — PROGRESS NOTES
"Date: 2020 06:46:05  Clinician: Shannan Artis  Clinician NPI: [id number]  Patient: Hema Rico  Patient : 2000  Patient Address: [address]  [place] [address]  Patient Phone: [phone]  Visit Protocol: URI  Patient Summary:  Hema is a 20 year old ( : 2000 ) male who initiated a Visit for cold, sinus infection, or influenza. When asked the question \"Please sign me up to receive news, health information and promotions. \", Hema responded \"Yes\".    Hema states his symptoms started suddenly 1 month or more ago. After his symptoms started, they improved and then got worse again.   His symptoms consist of facial pain or pressure, a cough, nasal congestion, and a headache. He is experiencing difficulty breathing due to nasal congestion but he is not short of breath.   Symptom details     Nasal secretions: The color of his mucus is white.    Cough: Hema coughs a few times an hour and his cough is more bothersome at night. Phlegm comes into his throat when he coughs. He believes his cough is caused by post-nasal drip. The color of the phlegm is white.     Facial pain or pressure: He has not had the facial pain or pressure for 12 weeks or more. The facial pain or pressure feels worse when bending over or leaning forward.     Headache: Hema has not had the headache for 12 weeks or more. He states the headache is mild (1-3 on a 10 point pain scale).      Hema denies having rhinitis, myalgias, sore throat, malaise, ear pain, enlarged lymph nodes, chills, diarrhea, vomiting, nausea, teeth pain, fever, and wheezing. He also denies taking antibiotic medication for the symptoms, having recent facial or sinus surgery in the past 60 days, and having a sinus infection within the past year.   Precipitating events  He has not recently been exposed to someone with influenza. Hema has not been in close contact with any high risk individuals.   Pertinent COVID-19 (Coronavirus) information  " Hema has not traveled internationally or to the areas where COVID-19 (Coronavirus) is widespread, including cruise ship travel in the last 14 days before the start of his symptoms.   Hema has not had a close contact with a laboratory-confirmed COVID-19 patient within 14 days of symptom onset. He also has not had a close contact with a suspected COVID-19 patient within 14 days of symptom onset.   Hema does not work or volunteer as healthcare worker or a  and does not work or volunteer in a healthcare facility. He does not live with a healthcare worker.   Pertinent medical history  Hema does not need a return to work/school note.   Weight: 200 lbs   Hema does not smoke or use smokeless tobacco.   Additional information as reported by the patient (free text): I can't sleep at night I have constant nasal drip I used Flonase Claritin and netti pot and no relief since I last spoke to you.  my nose is stuffed up which makes me cough at night I am getting no relief my pressure on my face has gotten worse it feels like sinus affection . I am getting headaches due to pressure in face and not sleeping . Thanks   Weight: 200 lbs    MEDICATIONS: No current medications, ALLERGIES: Sulfa (Sulfonamide Antibiotics)  Clinician Response:  Dear Hema,  Based on the information provided, you have acute bacterial sinusitis, also known as a sinus infection. Sinus infections are caused by bacteria or a virus and symptoms are almost always identical. The difference between the 2 types of infections is timing.  Sinus infections start as viral infections and symptoms improve on their own in about 7 days. If symptoms have not improved after 7 days or have even worsened, a bacterial infection may have developed.  Medication information  I am prescribing:     Azithromycin (Zithromax Z-Tony) 250 mg oral tablet. Take 2 tablets by mouth on day 1, then 1 tablet by mouth on days 2-5. There are no refills with this  prescription.   Self care  Steps you can take to be as comfortable as possible:     Rest.    Drink plenty of fluids.    Take a warm shower to loosen congestion    Use a cool-mist humidifier.    Take a spoonful of honey to reduce your cough.     When to seek care  Please be seen in a clinic or urgent care if any of the following occur:     New symptoms develop, or symptoms become worse    Symptoms do not start to improve after 3 days of treatment     Call ahead before going to the clinic or urgent care.  It is possible to have an allergic reaction to an antibiotic even if you have not had one in the past. If you notice a new rash, significant swelling, or difficulty breathing, stop taking this medication immediately and go to a clinic or urgent care.  COVID-19 (Coronavirus) General Information  With the increase in the number of COVID-19 (Coronavirus) cases, we understand you may have some questions. Below is some helpful information on COVID-19 (Coronavirus).  How can I protect myself and others from the COVID-19 (Coronavirus)?  Because there is currently no vaccine to prevent infection, the best way to protect yourself is to avoid being exposed to this virus. Put distance between yourself and other people if COVID-19 (Coronavirus) is spreading in your community. The virus is thought to spread mainly from person-to-person.     Between people who are in close contact with one another (within about 6 about) for a prolonged period (10 minutes or longer).    Through respiratory droplets produced when an infected person coughs or sneezes.     The CDC recommends the following additional steps to protect yourself and others:     Wash your hands often with soap and water for at least 20 seconds, especially after blowing your nose, coughing, or sneezing; going to the bathroom; and before eating or preparing food.  Use an alcohol-based hand  that contains at least 60 percent alcohol if soap and water are not  available.        Avoid touching your eyes, nose and mouth with unwashed hands.    Avoid close contact with people who are sick.    Stay home when you are sick.    Cover your cough or sneeze with a tissue, then throw the tissue in the trash.    Clean and disinfect frequently touched objects and surfaces.     You can help stop COVID-19 (Coronavirus) by knowing the signs and symptoms:     Fever    Cough    Shortness of breath     Contact your healthcare provider if   Develop symptoms   AND   Have been in close contact with a person known to have COVID-19 (Coronavirus) or live in or have recently traveled from an area with ongoing spread of COVID-19 (Coronavirus). Call ahead before you go to a doctor's office or emergency room. Tell them about your recent travel and your symptoms.   For the most up to date information, visit the CDC's website.  Self-monitoring  Self-monitoring means people should monitor themselves for fever by taking their temperatures twice a day and remain alert for a cough or difficulty breathing.  It is important to check your health two times each day for 14 days after a potential exposure to a person with COVID-19 (Coronavirus) or after travel from a location where COVID-19 (Coronavirus) is widespread. If you have been exposed to a person with COVID-19 (Coronavirus), it may take up to 14 days to know if you will get sick. Follow the steps below to check and record your health.     Take your temperature with a thermometer twice a day, once in the morning and once in the evening, and watch for a cough or difficulty breathing for 14 days.    Write down your temperature and any COVID-19 symptoms you may have: feeling feverish, coughing, or difficulty breathing.    Stay home from work or school.    Do not take public transportation, taxis, or ride-shares.    Avoid crowded places (such as shopping centers and movie theaters) and limit your activities in public.    Keep your distance from others (about  6 feet or 2 meters).    If you get sick with fever, cough, or trouble breathing, contact your healthcare provider and tell them about your recent travel and/or your symptoms.    If you need to seek medical care for other reasons, such as dialysis, call ahead to your doctor and tell them about your recent travel.     Steps to help prevent the spread of COVID-19 (Coronavirus) if you are sick  If you are sick with COVID-19 (Coronavirus) or suspect you are infected with the virus that causes COVID-19 (Coronavirus), follow the steps below to help prevent the disease from spreading&nbsp;to people in your home and community.     Stay home except to get medical care. Home isolation may be started in consultation with your healthcare clinician.    Separate yourself from other people and animals in your home.    Call ahead before visiting your doctor if you have a medical appointment.    Wear a facemask when you are around other people.    Cover your cough and sneezes.    Clean your hands often.    Avoid sharing personal household items.    Clean and disinfect frequently touched objects and surfaces everyday.    You will need to have someone drop off medications or household supplies (if needed) at your house without coming inside or in contact with you or others living in your house.    Monitor your symptoms and seek prompt medical care if your illness is worsening (e.g. Difficulty breathing).    Discontinue home isolation only in consultation with your healthcare provider.     For more detailed and up to date information on what to do if you are sick, visit this link: What to Do If You Are Sick With COVID-19.  Do I need to be tested for COVID-19 (Coronavirus)?     Not everyone needs to be tested for COVID-19 (Coronavirus). Decisions on which patients receive testing will be based on the local spread of COVID-19 (Coronavirus) as well as the symptoms. Your healthcare provider will make the final decision on whether you should  "be tested.    In the meantime, if you have concerns that you may have been exposed, it is reasonable to practice \"social distancing.\"&nbsp; If you are ill with a cold or flu-like illness, please monitor your symptoms and call your healthcare provider if your symptoms worsen.    For more up to date information, visit this link: COVID-19 (Coronavirus) Frequently Asked Questions and Answers.      Diagnosis: Acute bacterial sinusitis  Diagnosis ICD: J01.90  Prescription: azithromycin (Zithromax Z-Tony) 250 mg oral tablet 6 tablet, 5 days supply. Take 2 tablets by mouth on day 1, then 1 tablet by mouth on days 2-5. Refills: 0, Refill as needed: no, Allow substitutions: yes  Pharmacy: [id number] - [phone] - [address] [facility] [address], [place] [address]  "

## 2020-04-24 ENCOUNTER — VIRTUAL VISIT (OUTPATIENT)
Dept: FAMILY MEDICINE | Facility: OTHER | Age: 20
End: 2020-04-24

## 2020-04-25 NOTE — PROGRESS NOTES
"Date: 2020 22:12:42  Clinician: Zenaida Mills  Clinician NPI: [id number]  Patient: Hema Rico  Patient : 2000  Patient Address: [address] [place] [id number]  Patient Phone: [phone]  Visit Protocol: URI  Patient Summary:  Hema is a 20 year old ( : 2000 ) male who initiated a Visit for cold, sinus infection, or influenza. When asked the question \"Please sign me up to receive news, health information and promotions. \", Hema responded \"Yes\".    Hema states his symptoms started gradually 1 month or more ago. After his symptoms started, they improved and then got worse again.   His symptoms consist of a cough, nasal congestion, and malaise. He is experiencing mild difficulty breathing with activities but can speak normally in full sentences. Hema also feels feverish but was unable to measure his temperature.   Symptom details     Nasal secretions: The color of his mucus is white.    Cough: Hema coughs a few times an hour and his cough is more bothersome at night. Phlegm comes into his throat when he coughs. He believes his cough is caused by post-nasal drip. The color of the phlegm is white.      Hema denies having facial pain or pressure, sore throat, ear pain, myalgias, rhinitis, headache, wheezing, enlarged lymph nodes, chills, vomiting, nausea, teeth pain, ageusia, anosmia, and diarrhea. He also denies having recent facial or sinus surgery in the past 60 days.   Precipitating events  He has not recently been exposed to someone with influenza. Hema has not been in close contact with any high risk individuals.   Pertinent COVID-19 (Coronavirus) information  Hema has not traveled internationally or to the areas where COVID-19 (Coronavirus) is widespread, including cruise ship travel in the last 14 days before the start of his symptoms.   Hema does not work or volunteer as healthcare worker or a  and does not work or volunteer in a healthcare " facility.   He does not live with a healthcare worker.   Hema has not had a close contact with a laboratory-confirmed COVID-19 patient within 14 days of symptom onset. He also has not had a close contact with a suspected COVID-19 patient within 14 days of symptom onset.   Pertinent medical history  Hema had 2 sinus infections within the past year.   Hema has taken an antibiotic medication in the past month. Antibiotic details as reported by the patient (free text): zpack   Hema does not need a return to work/school note.   Weight: 195 lbs   Hema does not smoke or use smokeless tobacco.   Additional information as reported by the patient (free text): I am still getting nasal drip, I cough only when sleeping, my chest hursts.  I don't have a thermometer, sometimes I feel a bit warm, but nothing serious.  I started to feel better after the Zpack but this last week the nasal drip is back and my chest hurts-could be from coughing at night.  maybe another Zpack.  (I work from home and have not been around anyone at all except my dogs)   Weight: 195 lbs    MEDICATIONS: No current medications, ALLERGIES: Sulfa (Sulfonamide Antibiotics)  Clinician Response:  Dear Hema,  Based on the information provided, you have acute bacterial sinusitis, also known as a sinus infection. Sinus infections are caused by bacteria or a virus and symptoms are almost always identical. The difference between the 2 types of infections is timing.  Sinus infections start as viral infections and symptoms improve on their own in about 7 days. If symptoms have not improved after 7 days or have even worsened, a bacterial infection may have developed.  Medication information  I am prescribing:     Amoxicillin-pot clavulanate 875-125 mg oral tablet. Take 1 tablet by mouth every 12 hours for 10 days. There are no refills with this prescription.   Unless you are allergic to the over-the-counter medication(s) below, I recommend using:        Ibuprofen (Advil or store brand) 200 mg oral tablet. Take 1-3 tablets (200-600 mg) by mouth every 8 hours to help with the discomfort. Make sure to take the ibuprofen with food. Do not exceed 2400 mg in 24 hours.    A decongestant such as Sudafed PE or store brand.     Over-the-counter medications do not require a prescription. Ask the pharmacist if you have any questions.  Self care  Steps you can take to be as comfortable as possible:     Rest.    Drink plenty of fluids.    Take a warm shower to loosen congestion    Use a cool-mist humidifier.    Take a spoonful of honey to reduce your cough.     When to seek care  Please be seen in a clinic or urgent care if any of the following occur:     New symptoms develop, or symptoms become worse    Symptoms do not start to improve after 3 days of treatment     Call ahead before going to the clinic or urgent care.  It is possible to have an allergic reaction to an antibiotic even if you have not had one in the past. If you notice a new rash, significant swelling, or difficulty breathing, stop taking this medication immediately and go to a clinic or urgent care.  COVID-19 (Coronavirus) General Information  Because there is currently no vaccine to prevent infection, the best way to protect yourself is to avoid being exposed to this virus. Common symptoms of COVID-19 include but are not limited to fever, cough, and shortness of breath. These symptoms appear 2-14 days after you are exposed to the virus that causes COVID-19. Click here for more information from the CDC on how to protect yourself.  If you are sick with COVID-19 or suspect you are infected with the virus that causes COVID-19, follow the steps here from the CDC to help prevent the disease from spreading to people in your home and community.  Click here for general information from the CDC on testing.  If you develop any of these emergency warning signs for COVID-19, get medical attention immediately:     Trouble  breathing    Persistent pain or pressure in the chest    New confusion or inability to arouse    Bluish lips or face      Call your doctor or clinic before going in. Call 911 if you have a medical emergency and notify the  you have or think you may have COVID-19.  For more detailed and up to date information on COVID-19 (Coronavirus), please visit the CDC website.   Diagnosis: Acute bacterial sinusitis  Diagnosis ICD: J01.90  Prescription: amoxicillin-pot clavulanate 875-125 mg oral tablet 20 tablet, 10 days supply. Take 1 tablet by mouth every 12 hours for 10 days. Refills: 0, Refill as needed: no, Allow substitutions: yes  Pharmacy: [id number] - [phone] - [address] [facility] [address], [place] [address]

## 2020-05-10 ENCOUNTER — APPOINTMENT (OUTPATIENT)
Dept: GENERAL RADIOLOGY | Facility: HOSPITAL | Age: 20
End: 2020-05-10
Attending: INTERNAL MEDICINE
Payer: COMMERCIAL

## 2020-05-10 ENCOUNTER — HOSPITAL ENCOUNTER (EMERGENCY)
Facility: HOSPITAL | Age: 20
Discharge: HOME | End: 2020-05-10
Attending: INTERNAL MEDICINE | Admitting: INTERNAL MEDICINE
Payer: COMMERCIAL

## 2020-05-10 VITALS
DIASTOLIC BLOOD PRESSURE: 85 MMHG | OXYGEN SATURATION: 97 % | TEMPERATURE: 97.7 F | SYSTOLIC BLOOD PRESSURE: 121 MMHG | RESPIRATION RATE: 16 BRPM | HEART RATE: 78 BPM

## 2020-05-10 DIAGNOSIS — R05.8 ALLERGIC COUGH: ICD-10-CM

## 2020-05-10 PROCEDURE — 25000131 ZZH RX MED GY IP 250 OP 636 PS 637: Performed by: INTERNAL MEDICINE

## 2020-05-10 PROCEDURE — 25000132 ZZH RX MED GY IP 250 OP 250 PS 637: Performed by: INTERNAL MEDICINE

## 2020-05-10 PROCEDURE — 71045 X-RAY EXAM CHEST 1 VIEW: CPT | Mod: TC

## 2020-05-10 PROCEDURE — 99283 EMERGENCY DEPT VISIT LOW MDM: CPT | Mod: 25

## 2020-05-10 PROCEDURE — 99283 EMERGENCY DEPT VISIT LOW MDM: CPT | Mod: Z6 | Performed by: INTERNAL MEDICINE

## 2020-05-10 RX ORDER — PREDNISONE 20 MG/1
TABLET ORAL
Qty: 5 TABLET | Refills: 0 | Status: SHIPPED | OUTPATIENT
Start: 2020-05-10 | End: 2020-06-09

## 2020-05-10 RX ORDER — FLUTICASONE PROPIONATE 50 MCG
1 SPRAY, SUSPENSION (ML) NASAL DAILY
COMMUNITY
End: 2022-02-18

## 2020-05-10 RX ORDER — GUAIFENESIN/DEXTROMETHORPHAN 100-10MG/5
10 SYRUP ORAL ONCE
Status: COMPLETED | OUTPATIENT
Start: 2020-05-10 | End: 2020-05-10

## 2020-05-10 RX ORDER — PREDNISONE 20 MG/1
40 TABLET ORAL ONCE
Status: COMPLETED | OUTPATIENT
Start: 2020-05-10 | End: 2020-05-10

## 2020-05-10 RX ORDER — GUAIFENESIN AND DEXTROMETHORPHAN HYDROBROMIDE 100; 10 MG/5ML; MG/5ML
5 SOLUTION ORAL EVERY 4 HOURS PRN
Qty: 118 ML | Refills: 0 | Status: SHIPPED | OUTPATIENT
Start: 2020-05-10 | End: 2020-06-09

## 2020-05-10 RX ADMIN — PREDNISONE 40 MG: 20 TABLET ORAL at 03:12

## 2020-05-10 RX ADMIN — GUAIFENESIN AND DEXTROMETHORPHAN 10 ML: 100; 10 SYRUP ORAL at 03:12

## 2020-05-10 ASSESSMENT — ENCOUNTER SYMPTOMS
CHEST TIGHTNESS: 0
ABDOMINAL PAIN: 0
COUGH: 1
FLANK PAIN: 0
CHILLS: 0
BACK PAIN: 0
ABDOMINAL DISTENTION: 0
ANAL BLEEDING: 0
LIGHT-HEADEDNESS: 0
RHINORRHEA: 0
DIZZINESS: 0
NECK PAIN: 0
FREQUENCY: 0
WHEEZING: 0
BLOOD IN STOOL: 0
NUMBNESS: 0
SLEEP DISTURBANCE: 0
NAUSEA: 0
COLOR CHANGE: 0
MYALGIAS: 0
PALPITATIONS: 0
CONFUSION: 0
VOMITING: 0
HEADACHES: 0
VOICE CHANGE: 0
FEVER: 0
WEAKNESS: 0
DIAPHORESIS: 0
DYSURIA: 0
SHORTNESS OF BREATH: 0

## 2020-05-10 NOTE — ED AVS SNAPSHOT
[facility]  [address]  [place] [address]  Phone:  [phone]                                    Hema Rico   MRN: [id number]    Department:  [facility]   Date of Visit:  5/10/2020           After Visit Summary Signature Page    I have received my discharge instructions, and my questions have been answered. I have discussed any challenges I see with this plan with the nurse or doctor.    ..........................................................................................................................................  Patient/Patient Representative Signature      ..........................................................................................................................................  Patient Representative Print Name and Relationship to Patient    ..................................................               ................................................  Date                                   Time    ..........................................................................................................................................  Reviewed by Signature/Title    ...................................................              ..............................................  Date                                               Time          22EPIC Rev 08/18

## 2020-05-10 NOTE — ED PROVIDER NOTES
"  History     Chief Complaint   Patient presents with     Cough     at 2300 pt layed down and felt fine, then \"started having mucous in lungs and could barely breathe\"     The history is provided by the patient.   Cough   Cough characteristics:  Productive  Sputum characteristics:  Shukri  Severity:  Mild  Onset quality:  Gradual  Duration:  7 weeks  Timing:  Constant  Progression:  Waxing and waning  Chronicity:  New  Associated symptoms: no chest pain, no chills, no diaphoresis, no fever, no headaches, no myalgias, no rash, no rhinorrhea, no shortness of breath and no wheezing          Allergies:  Allergies   Allergen Reactions     Sulfa Drugs        Problem List:    Patient Active Problem List    Diagnosis Date Noted     Psychophysiologic insomnia 08/06/2019     Priority: Medium     Tobacco use disorder 06/06/2019     Priority: Medium     JENAE (generalized anxiety disorder) 03/06/2019     Priority: Medium     Episodic tension-type headache, not intractable 11/02/2018     Priority: Medium     Nasal congestion 06/06/2019     Priority: Low        Past Medical History:    History reviewed. No pertinent past medical history.    Past Surgical History:    Past Surgical History:   Procedure Laterality Date     NO HISTORY OF SURGERY         Family History:    Family History   Problem Relation Age of Onset     Asthma Mother      Anxiety Disorder Mother      Psychotic Disorder Father         panic attacks     Hypertension Father      Family History Negative Maternal Grandmother      Family History Negative Brother      Anxiety Disorder Brother         panic attack, drug abuse     Hypertension Maternal Grandfather      C.A.D. Maternal Grandfather      Family History Negative Paternal Grandmother      Family History Negative Paternal Grandfather      Gastrointestinal Disease Maternal Aunt         crohns     Heart Disease Other         family history Afib     Heart Disease Other         family history mitral valve     Thyroid " Disease No family hx of        Social History:  Marital Status:  Single [1]  Social History     Tobacco Use     Smoking status: Former Smoker     Types: Dip, chew, snus or snuff, Other     Smokeless tobacco: Current User     Types: Chew     Tobacco comment: e cigarettes   Substance Use Topics     Alcohol use: Yes     Frequency: 2-4 times a month     Comment: occ     Drug use: No        Medications:    Dextromethorphan-guaiFENesin  MG/5ML syrup  fluticasone (FLONASE) 50 MCG/ACT nasal spray  loratadine (CLARITIN) 10 MG tablet  predniSONE (DELTASONE) 20 MG tablet          Review of Systems   Constitutional: Negative for chills, diaphoresis and fever.   HENT: Positive for congestion, postnasal drip and sneezing. Negative for rhinorrhea and voice change.    Eyes: Negative for visual disturbance.   Respiratory: Positive for cough. Negative for chest tightness, shortness of breath and wheezing.    Cardiovascular: Negative for chest pain, palpitations and leg swelling.   Gastrointestinal: Negative for abdominal distention, abdominal pain, anal bleeding, blood in stool, nausea and vomiting.   Genitourinary: Negative for decreased urine volume, dysuria, flank pain and frequency.   Musculoskeletal: Negative for back pain, gait problem, myalgias and neck pain.   Skin: Negative for color change, pallor and rash.   Neurological: Negative for dizziness, syncope, weakness, light-headedness, numbness and headaches.   Psychiatric/Behavioral: Negative for confusion, sleep disturbance and suicidal ideas.       Physical Exam   BP: 116/81  Pulse: 80  Temp: 97.2  F (36.2  C)  Resp: 16  SpO2: 99 %      Physical Exam  Constitutional:       General: He is not in acute distress.     Appearance: He is not diaphoretic.   HENT:      Head: Atraumatic.   Eyes:      General: No scleral icterus.     Pupils: Pupils are equal, round, and reactive to light.   Cardiovascular:      Heart sounds: Normal heart sounds.   Pulmonary:      Effort: No  respiratory distress.      Breath sounds: Normal breath sounds.   Abdominal:      General: Bowel sounds are normal.      Palpations: Abdomen is soft.      Tenderness: There is no abdominal tenderness.   Musculoskeletal:         General: No tenderness.   Skin:     General: Skin is warm.      Findings: No rash.         ED Course        Procedures                   No results found for this or any previous visit (from the past 24 hour(s)).    Medications   predniSONE (DELTASONE) tablet 40 mg (40 mg Oral Given 5/10/20 0312)   guaiFENesin-dextromethorphan (ROBITUSSIN DM) 100-10 MG/5ML syrup 10 mL (10 mLs Oral Given 5/10/20 0312)       Assessments & Plan (with Medical Decision Making)    chronic cough for about 1.5 mo, accompanied with post nasal drip , sneezing  Similar symptoms in the past at this season  Finished 2 course of antibiotic without any good result  CXR: negative  Most likely allergic, prednisone taper started, follow-up with PCP  I have reviewed the nursing notes.    I have reviewed the findings, diagnosis, plan and need for follow up with the patient.      Discharge Medication List as of 5/10/2020  3:05 AM      START taking these medications    Details   Dextromethorphan-guaiFENesin  MG/5ML syrup Take 5 mLs by mouth every 4 hours as needed for cough, Disp-118 mL,R-0, Local Print      predniSONE (DELTASONE) 20 MG tablet 1 tab daily for 3 days, then 1/2 tab daily for 4 days, Disp-5 tablet,R-0, Local Print             Final diagnoses:   Allergic cough       5/10/2020   [facility]     Chauncey Lee MD  05/10/20 2783

## 2020-05-10 NOTE — ED NOTES
"Pt to room 2 of the ED. Pt reports march 26 he started having sinus drainage. April 4th pt was put on 5 day course of antibiotics. April 22 pt was put on 10 day course of antibiotics. Pt reports productive cough with \"yellow sputum with red speckles.\" pt has had cough since march 26 off and on. Pt currently coughing. Pt reports he has allergies and last year it was bad for him. Pt reports pain in his midsternal chest from coughing, worse with cough. Call light in reach.   "

## 2020-05-13 ENCOUNTER — VIRTUAL VISIT (OUTPATIENT)
Dept: FAMILY MEDICINE | Facility: OTHER | Age: 20
End: 2020-05-13
Attending: PHYSICIAN ASSISTANT
Payer: COMMERCIAL

## 2020-05-13 ENCOUNTER — TELEPHONE (OUTPATIENT)
Dept: FAMILY MEDICINE | Facility: OTHER | Age: 20
End: 2020-05-13

## 2020-05-13 DIAGNOSIS — R05.8 ALLERGIC COUGH: Primary | ICD-10-CM

## 2020-05-13 PROCEDURE — 99213 OFFICE O/P EST LOW 20 MIN: CPT | Mod: 95 | Performed by: PHYSICIAN ASSISTANT

## 2020-05-13 RX ORDER — ALBUTEROL SULFATE 90 UG/1
2 AEROSOL, METERED RESPIRATORY (INHALATION) EVERY 4 HOURS PRN
Qty: 1 INHALER | Refills: 1 | Status: SHIPPED | OUTPATIENT
Start: 2020-05-13 | End: 2021-07-14

## 2020-05-13 ASSESSMENT — PAIN SCALES - GENERAL: PAINLEVEL: MILD PAIN (3)

## 2020-05-13 NOTE — NURSING NOTE
"Chief Complaint   Patient presents with     Cough       Initial There were no vitals taken for this visit. Estimated body mass index is 26.25 kg/m  as calculated from the following:    Height as of 10/1/19: 1.905 m (6' 3\").    Weight as of 10/1/19: 95.3 kg (210 lb).  Medication Reconciliation: complete  Romel Marquez LPN  "

## 2020-05-13 NOTE — PROGRESS NOTES
"Hema Rico is a 20 year old male who is being evaluated via a billable telephone visit.      The patient has been notified of following:     \"This telephone visit will be conducted via a call between you and your physician/provider. We have found that certain health care needs can be provided without the need for a physical exam.  This service lets us provide the care you need with a short phone conversation.  If a prescription is necessary we can send it directly to your pharmacy.  If lab work is needed we can place an order for that and you can then stop by our lab to have the test done at a later time.    Telephone visits are billed at different rates depending on your insurance coverage. During this emergency period, for some insurers they may be billed the same as an in-person visit.  Please reach out to your insurance provider with any questions.    If during the course of the call the physician/provider feels a telephone visit is not appropriate, you will not be charged for this service.\"    Patient has given verbal consent for Telephone visit?  Yes    What phone number would you like to be contacted at? [phone]    How would you like to obtain your AVS? Mail a copy    Subjective     Hema Rico is a 20 year old male who presents to clinic today for the following health issues:    HPI  ED/UC Followup:    Facility:  [facility] [place]  Date of visit: 05/10/2020  Reason for visit: Allergic Cough  Current Status: Still very fatigues, occasionally trouble breathing, productive cough, SOB when laying down. Feels symptoms have not gotten worse, slightly improved.                 Patient Active Problem List   Diagnosis     Episodic tension-type headache, not intractable     JENAE (generalized anxiety disorder)     Nasal congestion     Tobacco use disorder     Psychophysiologic insomnia     Past Surgical History:   Procedure Laterality Date     NO HISTORY OF SURGERY         Social History     Tobacco Use     Smoking " status: Former Smoker     Types: Dip, chew, snus or snuff, Other     Smokeless tobacco: Current User     Types: Chew     Tobacco comment: e cigarettes   Substance Use Topics     Alcohol use: Yes     Frequency: 2-4 times a month     Comment: occ     Family History   Problem Relation Age of Onset     Asthma Mother      Anxiety Disorder Mother      Psychotic Disorder Father         panic attacks     Hypertension Father      Family History Negative Maternal Grandmother      Family History Negative Brother      Anxiety Disorder Brother         panic attack, drug abuse     Hypertension Maternal Grandfather      C.A.D. Maternal Grandfather      Family History Negative Paternal Grandmother      Family History Negative Paternal Grandfather      Gastrointestinal Disease Maternal Aunt         crohns     Heart Disease Other         family history Afib     Heart Disease Other         family history mitral valve     Thyroid Disease No family hx of          Current Outpatient Medications   Medication Sig Dispense Refill     albuterol (PROAIR HFA/PROVENTIL HFA/VENTOLIN HFA) 108 (90 Base) MCG/ACT inhaler Inhale 2 puffs into the lungs every 4 hours as needed for shortness of breath / dyspnea or wheezing 1 Inhaler 1     fluticasone (FLONASE) 50 MCG/ACT nasal spray Spray 1 spray into both nostrils daily       loratadine (CLARITIN) 10 MG tablet Take 1 tablet (10 mg) by mouth daily 30 tablet 11     predniSONE (DELTASONE) 20 MG tablet 1 tab daily for 3 days, then 1/2 tab daily for 4 days 5 tablet 0     Dextromethorphan-guaiFENesin  MG/5ML syrup Take 5 mLs by mouth every 4 hours as needed for cough (Patient not taking: Reported on 5/13/2020) 118 mL 0     Allergies   Allergen Reactions     Sulfa Drugs      Recent Labs   Lab Test 08/01/18  1210 11/25/14  1628   ALT 32 21   CR 0.63* 0.69   GFRESTIMATED >90 >90  Non African American GFR Calc     GFRESTBLACK >90 >90  African American GFR Calc     POTASSIUM 3.7 3.6   TSH 2.59 3.06      BP  Readings from Last 3 Encounters:   05/10/20 121/85   10/01/19 116/64   09/09/19 118/64    Wt Readings from Last 3 Encounters:   10/01/19 95.3 kg (210 lb)   09/09/19 93 kg (205 lb)   08/06/19 90.7 kg (200 lb)                    Reviewed and updated as needed this visit by Provider         Review of Systems   Constitutional, HEENT, cardiovascular, pulmonary, gi and gu systems are negative, except as otherwise noted.       Objective   Reported vitals:  There were no vitals taken for this visit.   healthy, alert and no distress  PSYCH: Alert and oriented times 3; coherent speech, normal   rate and volume, able to articulate logical thoughts, able   to abstract reason, no tangential thoughts, no hallucinations   or delusions  His affect is normal  RESP: No cough, no audible wheezing, able to talk in full sentences  Remainder of exam unable to be completed due to telephone visits    Diagnostic Test Results:  Labs reviewed in Epic        Assessment/Plan:  1. Allergic cough  Was in er on Day 3 of steroid still coughing and deltasone help wheezing but coughing at night still there.  Try albuterol.  See if this helps.  Stop smoking all substances. Let me know in a week where things are at.   - albuterol (PROAIR HFA/PROVENTIL HFA/VENTOLIN HFA) 108 (90 Base) MCG/ACT inhaler; Inhale 2 puffs into the lungs every 4 hours as needed for shortness of breath / dyspnea or wheezing  Dispense: 1 Inhaler; Refill: 1    No follow-ups on file.      Phone call duration:  12 minutes    FIDENCIO Platt

## 2020-06-09 ENCOUNTER — VIRTUAL VISIT (OUTPATIENT)
Dept: FAMILY MEDICINE | Facility: OTHER | Age: 20
End: 2020-06-09
Attending: FAMILY MEDICINE
Payer: COMMERCIAL

## 2020-06-09 VITALS — WEIGHT: 205 LBS | BODY MASS INDEX: 25.62 KG/M2

## 2020-06-09 DIAGNOSIS — J34.1 MUCOUS RETENTION CYST OF MAXILLARY SINUS: ICD-10-CM

## 2020-06-09 DIAGNOSIS — J30.2 SEASONAL ALLERGIC RHINITIS, UNSPECIFIED TRIGGER: ICD-10-CM

## 2020-06-09 DIAGNOSIS — J32.9 CHRONIC CONGESTION OF PARANASAL SINUS: Primary | ICD-10-CM

## 2020-06-09 DIAGNOSIS — J45.991 COUGH VARIANT ASTHMA: ICD-10-CM

## 2020-06-09 PROCEDURE — 99213 OFFICE O/P EST LOW 20 MIN: CPT | Mod: 95 | Performed by: FAMILY MEDICINE

## 2020-06-09 RX ORDER — PREDNISONE 20 MG/1
20 TABLET ORAL 2 TIMES DAILY
Qty: 10 TABLET | Refills: 0 | Status: ON HOLD | OUTPATIENT
Start: 2020-06-09 | End: 2020-08-26

## 2020-06-09 RX ORDER — MONTELUKAST SODIUM 10 MG/1
10 TABLET ORAL AT BEDTIME
Qty: 30 TABLET | Refills: 5 | Status: SHIPPED | OUTPATIENT
Start: 2020-06-09 | End: 2020-10-01 | Stop reason: SINTOL

## 2020-06-09 ASSESSMENT — PAIN SCALES - GENERAL: PAINLEVEL: MILD PAIN (2)

## 2020-06-09 NOTE — NURSING NOTE
"Chief Complaint   Patient presents with     Allergies     ent referral        Initial Wt 93 kg (205 lb)   BMI 25.62 kg/m   Estimated body mass index is 25.62 kg/m  as calculated from the following:    Height as of 10/1/19: 1.905 m (6' 3\").    Weight as of this encounter: 93 kg (205 lb).  Medication Reconciliation: complete  Tayler Gonzalez LPN    "

## 2020-06-09 NOTE — PROGRESS NOTES
"Hema Rico is a 20 year old male who is being evaluated via a billable telephone visit.      The patient has been notified of following:     \"This telephone visit will be conducted via a call between you and your physician/provider. We have found that certain health care needs can be provided without the need for a physical exam.  This service lets us provide the care you need with a short phone conversation.  If a prescription is necessary we can send it directly to your pharmacy.  If lab work is needed we can place an order for that and you can then stop by our lab to have the test done at a later time.    Telephone visits are billed at different rates depending on your insurance coverage. During this emergency period, for some insurers they may be billed the same as an in-person visit.  Please reach out to your insurance provider with any questions.    If during the course of the call the physician/provider feels a telephone visit is not appropriate, you will not be charged for this service.\"    Patient has given verbal consent for Telephone visit?  Yes    What phone number would you like to be contacted at? [phone]    How would you like to obtain your AVS? Mail a copy    Subjective     Hema Rico is a 20 year old male who presents via phone visit today for the following health issues:    HPI  ALLERGIES     Onset: 3 years    Description:   Nasal congestion: YES  Sneezing: YES  Red, itchy eyes: YES    Progression of Symptoms:  worse    Accompanying Signs & Symptoms:  Cough: YES  Wheezing: no   Rash: no  Sinus/facial pain: YES   History:   Is it seasonal: during any time of the year   History of Asthma: no   Has allergy testing been done: no    Precipitating factors:   None    Alleviating factors:  None       Therapies Tried and outcome: ventolin, loratadine, Flonase     Worse at night with Chest tightness and SOB   Albuterol makes sx resolve  Allergies  getting worse-- seems to correlate moving in " "new house  No mold in house / has  Basement-- \" looks clean\"   No carpet / vents have been cleared  No new dogs or animals.   No h/o asthma     Seen in ER this year    Seen me in 2019 and then had CT scan and ENT  Never had f/u with ENT              Current Outpatient Medications   Medication Sig Dispense Refill     albuterol (PROAIR HFA/PROVENTIL HFA/VENTOLIN HFA) 108 (90 Base) MCG/ACT inhaler Inhale 2 puffs into the lungs every 4 hours as needed for shortness of breath / dyspnea or wheezing 1 Inhaler 1     fluticasone (FLONASE) 50 MCG/ACT nasal spray Spray 1 spray into both nostrils daily       Allergies   Allergen Reactions     Sulfa Drugs        Reviewed and updated as needed this visit by Provider         Review of Systems   Constitutional, HEENT, cardiovascular, pulmonary, gi and gu systems are negative, except as otherwise noted.       Objective   Reported vitals:  There were no vitals taken for this visit.   healthy, alert and no distress  PSYCH: Alert and oriented times 3; coherent speech, normal   rate and volume, able to articulate logical thoughts, able   to abstract reason, no tangential thoughts, no hallucinations   or delusions  His affect is normal  RESP: No cough, no audible wheezing, able to talk in full sentences  Remainder of exam unable to be completed due to telephone visits            Assessment/Plan:    ICD-10-CM    1. Chronic congestion of paranasal sinus  J32.9 OTOLARYNGOLOGY REFERRAL     montelukast (SINGULAIR) 10 MG tablet     predniSONE (DELTASONE) 20 MG tablet   2. Seasonal allergic rhinitis, unspecified trigger  J30.2 OTOLARYNGOLOGY REFERRAL     montelukast (SINGULAIR) 10 MG tablet     predniSONE (DELTASONE) 20 MG tablet   3. Mucous retention cyst of maxillary sinus  J34.1 OTOLARYNGOLOGY REFERRAL     montelukast (SINGULAIR) 10 MG tablet     predniSONE (DELTASONE) 20 MG tablet   4. Cough variant asthma  J45.991 OTOLARYNGOLOGY REFERRAL     montelukast (SINGULAIR) 10 MG tablet     " "predniSONE (DELTASONE) 20 MG tablet   Discussed above.  No s/s of infection. Will start with prednisone for 5 days along with adding Sinqulair.  Has prn Ventolin and he is to continue Claritin along with flonase.  Will get him in \"asap\" to see ENT. Pt is aware of probable longer wait due to back log due to Covid.  Symptomatic treatment was discussed along what is available for OTC medications for symptomatic relief.   .Symptomatic treatment was discussed along when patient should call and/or come back into the clinic or go to ER/Urgent care. All questions answered.   Pt agree with plan.       No follow-ups on file.      Phone call duration:  12 minutes    Philippe Duran MD        "

## 2020-07-15 DIAGNOSIS — Z11.59 SCREENING FOR VIRAL DISEASE: ICD-10-CM

## 2020-08-06 ENCOUNTER — OFFICE VISIT (OUTPATIENT)
Dept: OTOLARYNGOLOGY | Facility: OTHER | Age: 20
End: 2020-08-06
Attending: OTOLARYNGOLOGY
Payer: COMMERCIAL

## 2020-08-06 VITALS
DIASTOLIC BLOOD PRESSURE: 68 MMHG | HEART RATE: 75 BPM | HEIGHT: 75 IN | TEMPERATURE: 98.2 F | SYSTOLIC BLOOD PRESSURE: 128 MMHG | BODY MASS INDEX: 26.11 KG/M2 | OXYGEN SATURATION: 98 % | WEIGHT: 210 LBS

## 2020-08-06 DIAGNOSIS — J45.20 MILD INTERMITTENT ASTHMA WITHOUT COMPLICATION: ICD-10-CM

## 2020-08-06 DIAGNOSIS — J34.2 DNS (DEVIATED NASAL SEPTUM): ICD-10-CM

## 2020-08-06 DIAGNOSIS — J32.2 CHRONIC ETHMOIDAL SINUSITIS: ICD-10-CM

## 2020-08-06 DIAGNOSIS — Z01.818 PRE-OPERATIVE EXAMINATION: ICD-10-CM

## 2020-08-06 DIAGNOSIS — J34.3 NASAL TURBINATE HYPERTROPHY: ICD-10-CM

## 2020-08-06 DIAGNOSIS — Z71.6 TOBACCO ABUSE COUNSELING: ICD-10-CM

## 2020-08-06 DIAGNOSIS — J32.0 CHRONIC MAXILLARY SINUSITIS: Primary | ICD-10-CM

## 2020-08-06 PROCEDURE — 31231 NASAL ENDOSCOPY DX: CPT | Performed by: OTOLARYNGOLOGY

## 2020-08-06 PROCEDURE — 99214 OFFICE O/P EST MOD 30 MIN: CPT | Mod: 25 | Performed by: OTOLARYNGOLOGY

## 2020-08-06 RX ORDER — BUDESONIDE 0.5 MG/2ML
INHALANT ORAL
Qty: 3 BOX | Refills: 11 | Status: SHIPPED | OUTPATIENT
Start: 2020-08-06 | End: 2020-10-01

## 2020-08-06 RX ORDER — PREDNISONE 20 MG/1
20 TABLET ORAL DAILY
Qty: 4 TABLET | Refills: 0 | Status: ON HOLD | OUTPATIENT
Start: 2020-09-04 | End: 2020-08-26

## 2020-08-06 ASSESSMENT — MIFFLIN-ST. JEOR: SCORE: 2038.18

## 2020-08-06 ASSESSMENT — PAIN SCALES - GENERAL: PAINLEVEL: NO PAIN (0)

## 2020-08-06 NOTE — LETTER
2020         RE: Hema Rico  [address]  [facility] [address]        Dear Colleague,    Thank you for referring your patient, Hema Rico, to the [facility]. Please see a copy of my visit note below.    Otolaryngology Progress Notes    Patient: Hema Rico  : 2000    Patient presents with:  RECHECK: Follow Up Seasonal Allergic Rhinitis, Mucous Retention Cyst of Maxillary Sinus, Chronic Congestion, Nasal Turbinate Hypertrophy      HPI:  Hema Rico is a 20 year old male seen today for chronic bilateral nasal congestion  Symptoms most noticeable at night   Associated chronic post nasal drainage, maxillary periorbital pressure  Symptoms have been present for over 5 years    Recent diagnosis of asthma  He feels his postnasal drainage and nasal congestion have led to a new onset asthma    He is taking loratadine 10 mg daily Singulair and Flonase.  He has used Neilmed saline and budesonide irrigations in the past although is not using them currently    Mom has hx of asthma    Starting in March he developed sinusitis, tx with abx   Progressed and he presented to ED for wheezing on May 10    He was started on prednisone flonase, singulair   Using albuteral every night x 5 days  Not on an inhaled steroid    CT sinuses dated 3/6/2019 shows clear frontal sinuses the ethmoid sinuses are clear there is a deviated nasal septum with a thick bony spur and contact against the right inferior turbinate with bilateral inferior turbinate hypertrophy there is a large Stan cell on the right which narrows the ostiomeatal complex and a smaller Stan cell on the left there is a large retention cyst in the left maxillary sinus smaller retention cyst on the right the sphenoid sinuses are clear the skull base is intact the optic nerve is not dehiscent the lamina is intact.   Mpt left AE   keros 3, terry   SNOT 27    Normal CXR 5/10/20    Using 3 mg nicotine salt    Formerly saw  "Nieves on 5/14      Current Outpatient Rx   Medication Sig Dispense Refill     albuterol (PROAIR HFA/PROVENTIL HFA/VENTOLIN HFA) 108 (90 Base) MCG/ACT inhaler Inhale 2 puffs into the lungs every 4 hours as needed for shortness of breath / dyspnea or wheezing 1 Inhaler 1     fluticasone (FLONASE) 50 MCG/ACT nasal spray Spray 1 spray into both nostrils daily       montelukast (SINGULAIR) 10 MG tablet Take 1 tablet (10 mg) by mouth At Bedtime 30 tablet 5     predniSONE (DELTASONE) 20 MG tablet Take 1 tablet (20 mg) by mouth 2 times daily (Patient not taking: Reported on 8/6/2020) 10 tablet 0       Allergies: Sulfa drugs     No past medical history on file.    Past Surgical History:   Procedure Laterality Date     NO HISTORY OF SURGERY         ENT family history reviewed    Social History     Tobacco Use     Smoking status: Former Smoker     Types: Dip, chew, snus or snuff, Other     Smokeless tobacco: Current User     Types: Chew     Tobacco comment: e cigarettes   Substance Use Topics     Alcohol use: Yes     Frequency: 2-4 times a month     Comment: occ     Drug use: No       Review of Systems  ROS: 10 point ROS neg other than the symptoms noted above in the HPI and heartburn, headaches    Physical Exam  /68 (BP Location: Left arm, Patient Position: Sitting, Cuff Size: Adult Large)   Pulse 75   Temp 98.2  F (36.8  C) (Tympanic)   Ht 1.905 m (6' 3\")   Wt 95.3 kg (210 lb)   SpO2 98%   BMI 26.25 kg/m    General - The patient is well nourished and well developed, and appears to have good nutritional status.  Alert and oriented to person and place, answers questions and cooperates with examination appropriately.   Head and Face - Normocephalic and atraumatic, with no gross asymmetry noted.  The facial nerve is intact, with strong symmetric movements.  Voice and Breathing - The patient was breathing comfortably without the use of accessory muscles. There was no wheezing, stridor, or stertor.  The patients " voice was clear and strong, and had appropriate pitch and quality.  No mena peripheral digital clubbing or cyanosis   Ears -The external auditory canals are patent, the tympanic membranes are intact without effusion, retraction or mass.  Bony landmarks are intact.  Eyes - Extraocular movements intact, and the pupils were reactive to light.  Sclera were not icteric or injected, conjunctiva were pink and moist.  Mouth - Examination of the oral cavity showed pink, healthy oral mucosa. No lesions or ulcerations noted.  The tongue was mobile and midline, and the dentition were in good condition.    Throat - The walls of the oropharynx were smooth, pink, moist, symmetric, and had no lesions or ulcerations.  The tonsillar pillars and soft palate were symmetric.  The uvula was midline on elevation.  Thick post nasal drainage noted posterior oropharyngeal wall.  Neck - No palpable enlarged fixed cervical lymph nodes.  No neck cysts or unusual tenderness to palpation.   No palpable fixed thyroid nodules or concerning goiter.  The trachea is grossly midline.   Nose - External contour is symmetric, no gross deflection or scars.  Nasal mucosa is pink and moist with no abnormal mucus.  The septum and turbinates were evaluated: anterior mid thick DNS right with contact, bilateral  inferior turbinate hypertrophy .  No polyps, masses, or purulence noted on examination.    To evaluate the nose and sinuses, I performed rigid nasal endoscopy. The LPN had previously sprayed both nares with lidocaine and neosynephrine.    I began with the LEFT side using a 0 degree rigid nasal endoscope, and then similarly examined the RIGHT side    Findings:  Inferior turbinates:  enlarged  Middle turbinate and middle meatus:  No purulence, no polyposis  Mucosa is heatlhy throughout,  Polyp of anterior edge left MT, no diffuse polypoid degeneration  Sphenoethmoidal recess without purulence   Nasopharynx unable to tolerate viz  The patient tolerated the  procedure well      Impression and Plan- Hema Rico is a 20 year old male with:    ICD-10-CM    1. Chronic maxillary sinusitis  J32.0 predniSONE (DELTASONE) 20 MG tablet   2. Chronic ethmoidal sinusitis  J32.2    3. Mild intermittent asthma without complication  J45.20 beclomethasone HFA (QVAR REDIHALER) 80 MCG/ACT inhaler   4. DNS (deviated nasal septum)  J34.2    5. Nasal turbinate hypertrophy  J34.3    6. Tobacco abuse counseling  Z71.6          The risks and complications of bilateral endoscopic sinus surgery , septoplasty and turbinate reduction were openly discussed.  The potential risks include bleeding, general anesthesia, infection, scar formation, need for additional surgery, septal perforation, and rarely injury to the eye with the possibility of blindness,  injury to the brain, meningitis or other intracranial complications.  Nonsurgical options were discussed including prolonged antibioitics and/or oral and topical steroids.   Sinus anatomy and sinus disease were reviewed.  CT sinus was reviewed with the patient.  All questions were answered.     Will need to remove octaviano cells  Include bilateral max, anterior ethmoid, left MT polypectomy  Address right septal spur with endoscopic approach from left hemitransfiction incision    Continue all current medications recommended by me or my staff.    Continue budesonide irrigations.  Verbal and written education on use provided.    The importance of budesonide irrigations postoperatively was reinforced.    The correct use of nasal irrigations as prescribed will prevent synechiae and allow for proper recovery of the sinus mucosa.       Follow-up for Intradermal testing   Risks discussed including anaphylaxis were discussed    Risks of oral steroid use were discussed and include psychiatric/mood changes, insomnia, stomach ulcers and potential GI bleeding, blood sugar elevation/worsening diabetes, hip/bone necrosis called avascular necrosis, or bone  demineralization.      Tobacco cessation was strongly encouraged (nicotine salts).  The associated risk of head and neck cancer was discussed.  Every year of cessation offers health benefits. This was discussed with the patient today and they voiced understanding.  Quit tools and a nicotine patch were offered.          Lianne Mejía D.O.  Otolaryngology/Head and Neck Surgery  Allergy          Again, thank you for allowing me to participate in the care of your patient.        Sincerely,        Lianne Mejía MD

## 2020-08-06 NOTE — PATIENT INSTRUCTIONS
Thank you for allowing Dr. Mejía and our ENT team to participate in your care.  If your medications are too expensive, please give the nurse a call.  We can possibly change this medication.  If you have a scheduling or an appointment question please contact our Health Unit Coordinator at their direct line [phone].   ALL nursing questions or concerns can be directed to your ENT nurse at: [phone] - [place]    Use Qvar Inhaler as prescribed  Continue Claritin, Flonase  Use Budesonide Rinses Twice Daily  Use Albuterol as needed for rescue inhaler  Start Prednisone 5 days prior to surgery  Complete Covid Test prior to surgery  Follow up in surgery    Budesonide nasal saline irrigation per instructions:  -Obtain Matty Med Sinus rinse over the counter.    -Use warm distilled water and 2 packets of the salt solution that comes with the bottle, dissolve in bottle up to the 240 mL tee.  -Add 1 vial of budesonide.  -Irrigate each side of your nose leaning over the sink, using 1/3 to 1/2 the volume of the bottle in each nostril every irrigation.  Irrigate 2 times daily.  -If additional rinses are needed/recommended, you may use the plan Matty Med Sinus irrigation without the use of added budesonide.     Instructions for Sinus Surgery    Recovery - Everyone recovers differently from a general anesthetic.  Symptoms such as fatigue, nausea, light-headedness, and sometimes a low grade fever (up to 100 degrees) are not unusual.  As your body removes the anesthetic drugs from circulation, these symptoms will resolve.  Your nose will be sore after surgery, and you may even have symptoms similar to a sinus infection with headache, congestion, and pressure.  These will resolve with healing.  For several days you may experience bloody drainage from the nose, please use the drip pad as necessary for this.  If there is persistent bleeding, please call the office during business hours or the on call ENT physician after hours.   There are no diet restrictions after sinus surgery, and you can resume your home medications.      Please do not blow your nose until 1 week after surgery.  At 1 week you may gently blow your nose, unless otherwise indicated by Dr. Mejía.     Limit your activity to no strenuous activities until I see you for the first follow-up visit in approximately 2 weeks.      Medications - You were sent home with narcotic pain medication.  If you can tolerate the discomfort during your recovery by using just plain Tylenol or ibuprofen (advil), please do so.  However, do not hesitate to use the stronger pain medication if needed.  If you were sent home with an antibiotic, it is primarily used to help the healing process.  If it causes loose bowel movements or other signs of intolerance, it is appropriate to discontinue it.  By far the most important measure you can take to speed recovery, and maximize the chances of long term success of sinus surgery is using the sinus rinses at least three time per day for the first month after surgery.       Start Roz Med saline irrigation tomorrow and use at least 5 times daily.     I recommend 2 roz med bottles, one to add the budesonide to and irrigate with the budesonide rinse twice a day for 2 months.    In the other roz med bottle use only the saline solution, and irrigate with this at least 3 additional times daily.    Perform gentle irrigation for the first week.  Starting 1 week after surgery, you should increase the volume of roz med saline irrigation to each nostril, continuing to use the rinses in an alternating fashion at least 5 times daily.  You cannot use too much of the roz med saline, but please limit budesonide rinses to twice daily.    At 2 weeks after surgery, you may also restart nasal steroids (flonase, nasonex, etc).        Complications - Problems related to sinus surgery almost always are detected during the operation, and special instruction will be given  in that situation.  However, unexpected things can happen, and are all related to the structures around the sinus cavities.  Symptoms that should alert you to a possible problem include: severe eye pain or eye swelling, persistent heavy bleeding from the nose, and high fevers with headache and neck pain.  Any of these symptoms should be called into my office or to the on call ENT if after hours.  The most common non-emergency complication of sinus surgery is the formation of scar tissue which can re-block the sinuses.  This is addressed below.    Follow-up -  As you have noted, there are quite a few follow-up visits after sinus surgery.  This is done to aggressively manage the most common complication of this technique, which is scar tissue blocking the sinuses.  These visits will require the examination of your nose and possibly removal of crusts of dry mucous and blood, with possible removal of early scar tissue.  Please prepare for these visits by using your sinus rinses.    If there are any questions or issues with the above, or if there are other issues that concern you, always feel free to call the clinic and I am happy to speak with you as soon as I can.    Lianne Mejía D.O.  Otolaryngology/Head and Neck Surgery  Allergy    [phone] office            HOW TO PREPARE-      You need to have a scheduled Pre-Op with your primary care physician within 30 days of your scheduled surgery.        You need a friend or family member available to drive you home AND stay with you for 24 hours after you leave the hospital. You will not be allowed to drive yourself. IF you need to take a taxi or the bus you MUST have a responsible person to ride with you. YOUR PROCEDURE WILL BE CANCELLED IF YOU DO NOT HAVE A RESPONSIBLE ADULT TO DRIVE YOU HOME.       You CANNOT have anything to eat or drink after midnight the night before your surgery, including water and coffee. Your stomach needs to be completely empty. Do  NOT chew gum, suck on hard candy, or smoke. You can brush your teeth the morning of surgery.       The Surgery Education Nurses will call you  1-2 weeks prior to your surgery date at  [phone] or toll free [phone]. Please have your medication and allergy lists ready.      Stop your aspirin or other NSAIDs(Ibuprofen, Motrin, Aleve, Celebrex, Naproxen, etc...) 7 days before your surgery.      Hospital admitting will call you the day before your surgery with your exact arrival time.       Please call your primary care physician if you should become ill within 24 hours of scheduled surgery. (ex.vomiting, diarrhea, fever)          You will need to wash the night before AND the morning of you procedure with a liquid antibacterial soap, like Dial.

## 2020-08-06 NOTE — NURSING NOTE
"Chief Complaint   Patient presents with     RECHECK     Follow Up Seasonal Allergic Rhinitis, Mucous Retention Cyst of Maxillary Sinus, Chronic Congestion, Nasal Turbinate Hypertrophy       Initial /68 (BP Location: Left arm, Patient Position: Sitting, Cuff Size: Adult Large)   Pulse 75   Temp 98.2  F (36.8  C) (Tympanic)   Ht 1.905 m (6' 3\")   Wt 95.3 kg (210 lb)   SpO2 98%   BMI 26.25 kg/m   Estimated body mass index is 26.25 kg/m  as calculated from the following:    Height as of this encounter: 1.905 m (6' 3\").    Weight as of this encounter: 95.3 kg (210 lb).  Medication Reconciliation: complete  Sonja Vila MA    "

## 2020-08-06 NOTE — PROGRESS NOTES
Otolaryngology Progress Notes    Patient: Hema Rico  : 2000    Patient presents with:  RECHECK: Follow Up Seasonal Allergic Rhinitis, Mucous Retention Cyst of Maxillary Sinus, Chronic Congestion, Nasal Turbinate Hypertrophy      HPI:  Hema Rico is a 20 year old male seen today for chronic bilateral nasal congestion  Symptoms most noticeable at night   Associated chronic post nasal drainage, maxillary periorbital pressure  Symptoms have been present for over 5 years    Recent diagnosis of asthma  He feels his postnasal drainage and nasal congestion have led to a new onset asthma    He is taking loratadine 10 mg daily Singulair and Flonase.  He has used Neilmed saline and budesonide irrigations in the past although is not using them currently    Mom has hx of asthma    Starting in March he developed sinusitis, tx with abx   Progressed and he presented to ED for wheezing on May 10    He was started on prednisone flonase, singulair   Using albuteral every night x 5 days  Not on an inhaled steroid    CT sinuses dated 3/6/2019 shows clear frontal sinuses the ethmoid sinuses are clear there is a deviated nasal septum with a thick bony spur and contact against the right inferior turbinate with bilateral inferior turbinate hypertrophy there is a large Stan cell on the right which narrows the ostiomeatal complex and a smaller Stan cell on the left there is a large retention cyst in the left maxillary sinus smaller retention cyst on the right the sphenoid sinuses are clear the skull base is intact the optic nerve is not dehiscent the lamina is intact.   Mpt left AE   keros 3, terry /24  SNOT 27    Normal CXR 5/10/20    Using 3 mg nicotine salt    Formerly saw Nieves on       Current Outpatient Rx   Medication Sig Dispense Refill     albuterol (PROAIR HFA/PROVENTIL HFA/VENTOLIN HFA) 108 (90 Base) MCG/ACT inhaler Inhale 2 puffs into the lungs every 4 hours as needed for shortness of breath / dyspnea  "or wheezing 1 Inhaler 1     fluticasone (FLONASE) 50 MCG/ACT nasal spray Spray 1 spray into both nostrils daily       montelukast (SINGULAIR) 10 MG tablet Take 1 tablet (10 mg) by mouth At Bedtime 30 tablet 5     predniSONE (DELTASONE) 20 MG tablet Take 1 tablet (20 mg) by mouth 2 times daily (Patient not taking: Reported on 8/6/2020) 10 tablet 0       Allergies: Sulfa drugs     No past medical history on file.    Past Surgical History:   Procedure Laterality Date     NO HISTORY OF SURGERY         ENT family history reviewed    Social History     Tobacco Use     Smoking status: Former Smoker     Types: Dip, chew, snus or snuff, Other     Smokeless tobacco: Current User     Types: Chew     Tobacco comment: e cigarettes   Substance Use Topics     Alcohol use: Yes     Frequency: 2-4 times a month     Comment: occ     Drug use: No       Review of Systems  ROS: 10 point ROS neg other than the symptoms noted above in the HPI and heartburn, headaches    Physical Exam  /68 (BP Location: Left arm, Patient Position: Sitting, Cuff Size: Adult Large)   Pulse 75   Temp 98.2  F (36.8  C) (Tympanic)   Ht 1.905 m (6' 3\")   Wt 95.3 kg (210 lb)   SpO2 98%   BMI 26.25 kg/m    General - The patient is well nourished and well developed, and appears to have good nutritional status.  Alert and oriented to person and place, answers questions and cooperates with examination appropriately.   Head and Face - Normocephalic and atraumatic, with no gross asymmetry noted.  The facial nerve is intact, with strong symmetric movements.  Voice and Breathing - The patient was breathing comfortably without the use of accessory muscles. There was no wheezing, stridor, or stertor.  The patients voice was clear and strong, and had appropriate pitch and quality.  No mena peripheral digital clubbing or cyanosis   Ears -The external auditory canals are patent, the tympanic membranes are intact without effusion, retraction or mass.  Bony " landmarks are intact.  Eyes - Extraocular movements intact, and the pupils were reactive to light.  Sclera were not icteric or injected, conjunctiva were pink and moist.  Mouth - Examination of the oral cavity showed pink, healthy oral mucosa. No lesions or ulcerations noted.  The tongue was mobile and midline, and the dentition were in good condition.    Throat - The walls of the oropharynx were smooth, pink, moist, symmetric, and had no lesions or ulcerations.  The tonsillar pillars and soft palate were symmetric.  The uvula was midline on elevation.  Thick post nasal drainage noted posterior oropharyngeal wall.  Neck - No palpable enlarged fixed cervical lymph nodes.  No neck cysts or unusual tenderness to palpation.   No palpable fixed thyroid nodules or concerning goiter.  The trachea is grossly midline.   Nose - External contour is symmetric, no gross deflection or scars.  Nasal mucosa is pink and moist with no abnormal mucus.  The septum and turbinates were evaluated: anterior mid thick DNS right with contact, bilateral  inferior turbinate hypertrophy .  No polyps, masses, or purulence noted on examination.    To evaluate the nose and sinuses, I performed rigid nasal endoscopy. The LPN had previously sprayed both nares with lidocaine and neosynephrine.    I began with the LEFT side using a 0 degree rigid nasal endoscope, and then similarly examined the RIGHT side    Findings:  Inferior turbinates:  enlarged  Middle turbinate and middle meatus:  No purulence, no polyposis  Mucosa is heatlhy throughout,  Polyp of anterior edge left MT, no diffuse polypoid degeneration  Sphenoethmoidal recess without purulence   Nasopharynx unable to tolerate viz  The patient tolerated the procedure well      Impression and Plan- Hema Rico is a 20 year old male with:    ICD-10-CM    1. Chronic maxillary sinusitis  J32.0 predniSONE (DELTASONE) 20 MG tablet   2. Chronic ethmoidal sinusitis  J32.2    3. Mild intermittent  asthma without complication  J45.20 beclomethasone HFA (QVAR REDIHALER) 80 MCG/ACT inhaler   4. DNS (deviated nasal septum)  J34.2    5. Nasal turbinate hypertrophy  J34.3    6. Tobacco abuse counseling  Z71.6          The risks and complications of bilateral endoscopic sinus surgery , septoplasty and turbinate reduction were openly discussed.  The potential risks include bleeding, general anesthesia, infection, scar formation, need for additional surgery, septal perforation, and rarely injury to the eye with the possibility of blindness,  injury to the brain, meningitis or other intracranial complications.  Nonsurgical options were discussed including prolonged antibioitics and/or oral and topical steroids.   Sinus anatomy and sinus disease were reviewed.  CT sinus was reviewed with the patient.  All questions were answered.     Will need to remove octaviano cells  Include bilateral max, anterior ethmoid, left MT polypectomy  Address right septal spur with endoscopic approach from left hemitransfiction incision vs direct incision with flap elevation over spur on right    Continue all current medications recommended by me or my staff.    Continue budesonide irrigations.  Verbal and written education on use provided.    The importance of budesonide irrigations postoperatively was reinforced.    The correct use of nasal irrigations as prescribed will prevent synechiae and allow for proper recovery of the sinus mucosa.       Follow-up for Intradermal testing   Risks discussed including anaphylaxis were discussed    Risks of oral steroid use were discussed and include psychiatric/mood changes, insomnia, stomach ulcers and potential GI bleeding, blood sugar elevation/worsening diabetes, hip/bone necrosis called avascular necrosis, or bone demineralization.      Tobacco cessation was strongly encouraged (nicotine salts).  The associated risk of head and neck cancer was discussed.  Every year of cessation offers health  benefits. This was discussed with the patient today and they voiced understanding.  Quit tools and a nicotine patch were offered.          Lianne Mejía D.O.  Otolaryngology/Head and Neck Surgery  Allergy

## 2020-08-10 ENCOUNTER — PREP FOR PROCEDURE (OUTPATIENT)
Dept: OTOLARYNGOLOGY | Facility: OTHER | Age: 20
End: 2020-08-10

## 2020-08-10 DIAGNOSIS — J34.2 DEVIATED NASAL SEPTUM: ICD-10-CM

## 2020-08-10 DIAGNOSIS — J32.9 CHRONIC RECURRENT SINUSITIS: Primary | ICD-10-CM

## 2020-08-10 DIAGNOSIS — J34.3 NASAL TURBINATE HYPERTROPHY: ICD-10-CM

## 2020-08-17 ENCOUNTER — TELEPHONE (OUTPATIENT)
Dept: OTOLARYNGOLOGY | Facility: OTHER | Age: 20
End: 2020-08-17

## 2020-08-17 NOTE — TELEPHONE ENCOUNTER
I spoke with the patient today regarding allergy skin testing.    All general instructions are reviewed with the patient.      The patient is made aware of all medications that need to be held prior to testing, and will stop medications as directed per instructions.  The patient verbalized understanding and agree with plan.      Should the patient be given any additional medications prior to the day of testing, they are told to let the provider know that they are going to be allergy tested, so medications that need to be help prior to MQT are not prescribed.      An appointment will be arranged by the ENT [facility] for testing date and time.     This message is forwarded to the [facility] to arrange for an appointment. Written instructions are mailed to the patient as well,  by the ENT [facility].  Emily Sanchez RN

## 2020-08-17 NOTE — PROGRESS NOTES
[facility] [place]  [address]  [facility] [place] [address]  [phone]  Dept: [phone]    PRE-OP EVALUATION:  Today's date: 2020    Hema Rico (: 2000) presents for pre-operative evaluation assessment as requested by Dr. Mejía.  He requires evaluation and anesthesia risk assessment prior to undergoing surgery/procedure for treatment of BILATERAL ENDOSCOPIC SINUS SURGERY WITH PROPEL, SEPTOPLASTY, TURBINATE REDUCTION     Proposed Surgery/ Procedure: BILATERAL ENDOSCOPIC SINUS SURGERY WITH PROPEL, SEPTOPLASTY, TURBINATE REDUCTION  Date of Surgery/ Procedure: 2020  Time of Surgery/ Procedure: 01:30 pm  Hospital/Surgical Facility: [facility] [place]  Surgery Fax Number: Note does not need to be faxed, will be available electronically in Epic.  Primary Physician: Pema Allison  Type of Anesthesia Anticipated: General    Preoperative Questionnaire:   No - Have you ever had a heart attack or stroke?  No - Have you ever had surgery on your heart or blood vessels, such as a stent, coronary (heart) bypass, or surgery on an artery in the head, neck, heart, or legs?  No - Do you have chest pain when you are physically active?  No - Do you have a history of heart failure?  No - Do you currently have a cold, bronchitis, or symptoms of other respiratory (head and chest) infections?  No - Do you have a cough, shortness of breath, or wheezing?  No - Do you or anyone in your family have a history of blood clots?  No - Do you or anyone in your family have a serious bleeding problem, such as long-lasting bleeding after surgeries or cuts?  No - Have you ever had anemia or been told to take iron pills?  No - Have you had any abnormal blood loss such as black, tarry or bloody stools, or abnormal vaginal bleeding?  No - Have you ever had a blood transfusion?  Yes - Are you willing to have a blood transfusion if it is medically needed before, during, or after your surgery?  No - Have you or anyone in  your family ever had problems with anesthesia (sedation for surgery)?  No - Do you have sleep apnea, excessive snoring, or daytime drowsiness?   No - Do you have any artifical heart valves or other implanted medical devices, such as a pacemaker, defibrillator, or continuous glucose monitor?  No - Do you have any artifical joints?  No - Are you allergic to latex?  No - Is there any chance that you may be pregnant?    Patient has a Health Care Directive or Living Will:  NO    HPI:     HPI related to upcoming procedure: Hema has had issues with b/l nasal congestion, chronic post nasal drainage, and maxillary periorbital pressure for 5 years. He will be undergoing bilateral endoscopic sinus surgery, septoplasty, turbinate reduction, removal of octaviano cells, polypectomy.       ASTHMA / allergies - Patient has a longstanding history of moderate-severe Asthma . Patient has been doing well overall noting COUGH and continues on medication regimen consisting of listed below without adverse reactions or side effects.   - albuterol, less since starting Qvar. Uses mostly at night  - Qvar (beclomethasone)  - budesonide rinses, flonase, montelukast    # Cardiovascular: able to walk up a flight of stairs w/o cardiac or pulmonary issues    # Pulmonary: Former smoker (vaping). Last use one year ago    # Bleeding/Clotting risk: no personal or family h/o bleeding or clotting issues    # Previous surgical history: no issues with anesthesia      MEDICAL HISTORY:     Patient Active Problem List    Diagnosis Date Noted     Chronic recurrent sinusitis 08/10/2020     Priority: Medium     Added automatically from request for surgery [id number]       Deviated nasal septum 08/10/2020     Priority: Medium     Added automatically from request for surgery [id number]       Nasal turbinate hypertrophy 08/10/2020     Priority: Medium     Added automatically from request for surgery [id number]       Cough variant asthma 06/09/2020     Priority: Medium      Seasonal allergic rhinitis, unspecified trigger 06/09/2020     Priority: Medium     Chronic congestion of paranasal sinus 06/09/2020     Priority: Medium     Mucous retention cyst of maxillary sinus 06/09/2020     Priority: Medium     Psychophysiologic insomnia 08/06/2019     Priority: Medium     Tobacco use disorder 06/06/2019     Priority: Medium     JENAE (generalized anxiety disorder) 03/06/2019     Priority: Medium     Episodic tension-type headache, not intractable 11/02/2018     Priority: Medium     Nasal congestion 06/06/2019     Priority: Low      History reviewed. No pertinent past medical history.  Past Surgical History:   Procedure Laterality Date     HC TOOTH EXTRACTION W/FORCEP       Current Outpatient Medications   Medication Sig Dispense Refill     albuterol (PROAIR HFA/PROVENTIL HFA/VENTOLIN HFA) 108 (90 Base) MCG/ACT inhaler Inhale 2 puffs into the lungs every 4 hours as needed for shortness of breath / dyspnea or wheezing 1 Inhaler 1     beclomethasone HFA (QVAR REDIHALER) 80 MCG/ACT inhaler Inhale 1 puff into the lungs 2 times daily 2 Inhaler 3     budesonide (PULMICORT) 0.5 MG/2ML neb solution Squirt entire vial into previously made roz med saline bottle, mix, and irrigate each nostril until entire bottle empty.  Do this twice daily. 3 Box 11     fluticasone (FLONASE) 50 MCG/ACT nasal spray Spray 1 spray into both nostrils daily       loratadine (CLARITIN) 10 MG tablet Take 10 mg by mouth daily       montelukast (SINGULAIR) 10 MG tablet Take 1 tablet (10 mg) by mouth At Bedtime 30 tablet 5     multivitamin w/minerals (THERA-VIT-M) tablet Take 1 tablet by mouth daily       [START ON 9/4/2020] predniSONE (DELTASONE) 20 MG tablet Take 1 tablet (20 mg) by mouth daily Start 5 days prior to sinus surgery 4 tablet 0     predniSONE (DELTASONE) 20 MG tablet Take 1 tablet (20 mg) by mouth 2 times daily 10 tablet 0     Vitamin D, Cholecalciferol, 25 MCG (1000 UT) CAPS Take 1 capsule by mouth       OTC  "products: None    Allergies   Allergen Reactions     Sulfa Drugs      Unknown rxn. Rxn as infant      Latex Allergy: NO    Social History     Tobacco Use     Smoking status: Former Smoker     Types: Dip, chew, snus or snuff, Other     Smokeless tobacco: Current User     Types: Chew     Tobacco comment: e cigarettes   Substance Use Topics     Alcohol use: Yes     Frequency: 2-4 times a month     Comment: occ     History   Drug Use No       REVIEW OF SYSTEMS:   Constitutional, neuro, ENT, endocrine, pulmonary, cardiac, gastrointestinal, genitourinary, musculoskeletal, integument and psychiatric systems are negative, except as otherwise noted.    EXAM:   /60 (BP Location: Left arm, Patient Position: Sitting, Cuff Size: Adult Regular)   Pulse 66   Temp 97.8  F (36.6  C) (Tympanic)   Ht 1.905 m (6' 3\")   Wt 93.4 kg (206 lb)   SpO2 98%   BMI 25.75 kg/m      GENERAL APPEARANCE: healthy, alert and no distress     EYES: EOMI,  PERRL     HENT: ear canals and TM's normal and nose and mouth without ulcers or lesions     NECK: no adenopathy, no asymmetry, masses, or scars and thyroid normal to palpation     RESP: lungs clear to auscultation - no rales, rhonchi or wheezes     CV: regular rates and rhythm, normal S1 S2, no S3 or S4 and no murmur, click or rub     ABDOMEN:  soft, nontender, no HSM or masses and bowel sounds normal     MS: extremities normal- no gross deformities noted, no evidence of inflammation in joints, FROM in all extremities.     SKIN: no suspicious lesions or rashes     NEURO: Normal strength and tone, sensory exam grossly normal, mentation intact and speech normal     PSYCH: mentation appears normal. and affect normal/bright     LYMPHATICS: No cervical adenopathy    DIAGNOSTICS:   EKG: Not indicated due to non-vascular surgery and low risk of event (age <65 and without cardiac risk factors)    Labs Resulted Today:   Results for orders placed or performed in visit on 08/20/20   CBC with platelets "     Status: None   Result Value Ref Range    WBC 6.4 4.0 - 11.0 10e9/L    RBC Count 4.82 4.4 - 5.9 10e12/L    Hemoglobin 14.5 13.3 - 17.7 g/dL    Hematocrit 42.3 40.0 - 53.0 %    MCV 88 78 - 100 fl    MCH 30.1 26.5 - 33.0 pg    MCHC 34.3 31.5 - 36.5 g/dL    RDW 11.9 10.0 - 15.0 %    Platelet Count 188 150 - 450 10e9/L   Basic metabolic panel     Status: Abnormal   Result Value Ref Range    Sodium 137 133 - 144 mmol/L    Potassium 3.8 3.4 - 5.3 mmol/L    Chloride 103 94 - 109 mmol/L    Carbon Dioxide 26 20 - 32 mmol/L    Anion Gap 8 3 - 14 mmol/L    Glucose 138 (H) 70 - 99 mg/dL    Urea Nitrogen 14 7 - 30 mg/dL    Creatinine 0.75 0.66 - 1.25 mg/dL    GFR Estimate >90 >60 mL/min/[1.73_m2]    GFR Estimate If Black >90 >60 mL/min/[1.73_m2]    Calcium 9.2 8.5 - 10.1 mg/dL         Recent Labs   Lab Test 08/01/18  1210 08/26/15  1624 11/25/14  1628   HGB 15.0 15.8* 14.8    184 189     --  137   POTASSIUM 3.7  --  3.6   CR 0.63*  --  0.69      COVID test scheduled for 8/22/2020  IMPRESSION:   Reason for surgery/procedure:  bilateral endoscopic sinus surgery, septoplasty, turbinate reduction, removal of octaviano cells, polypectomy d/t chronic sinus issues  Diagnosis/reason for consult: medical management    The proposed surgical procedure is considered INTERMEDIATE risk.    REVISED CARDIAC RISK INDEX  The patient has the following serious cardiovascular risks for perioperative complications such as (MI, PE, VFib and 3  AV Block):  No serious cardiac risks  INTERPRETATION: 0 risks: Class I (very low risk - 0.4% complication rate)    The patient has the following additional risks for perioperative complications:  No identified additional risks      ICD-10-CM    1. Preop general physical exam  Z01.818 CBC with platelets     Basic metabolic panel   2. Chronic congestion of paranasal sinus  J32.9    3. Chronic recurrent sinusitis  J32.9    4. Mucous retention cyst of maxillary sinus  J34.1    5. Deviated nasal septum   J34.2    6. Seasonal allergic rhinitis, unspecified trigger  J30.2    7. Tobacco use disorder  F17.200        RECOMMENDATIONS:       Cardiovascular Risk  Performs 4 METs exercise without symptoms (Climb a flight of stairs) .       --Patient is to take all scheduled medications on the day of surgery including his prednisone.    Hema is optimized to proceed with proposed procedure, without further diagnostic evaluation       Signed Electronically by: Dixie Velásquez MD    Copy of this evaluation report is provided to requesting physician.    [facility] Preop Guidelines    Revised Cardiac Risk Index

## 2020-08-17 NOTE — PATIENT INSTRUCTIONS

## 2020-08-19 ENCOUNTER — ANESTHESIA EVENT (OUTPATIENT)
Dept: SURGERY | Facility: HOSPITAL | Age: 20
End: 2020-08-19
Payer: COMMERCIAL

## 2020-08-19 ASSESSMENT — LIFESTYLE VARIABLES: TOBACCO_USE: 1

## 2020-08-19 NOTE — ANESTHESIA PREPROCEDURE EVALUATION
Anesthesia Pre-Procedure Evaluation    Patient: Hema Rico   MRN: [id number] : 2000          Preoperative Diagnosis: Chronic recurrent sinusitis [J32.9]  Deviated nasal septum [J34.2]  Nasal turbinate hypertrophy [J34.3]    Procedure(s):  BILATERAL ENDOSCOPIC SINUS SURGERY WITH PROPEL  SEPTOPLASTY, TURBINATE REDUCTION    No past medical history on file.  Past Surgical History:   Procedure Laterality Date     NO HISTORY OF SURGERY         Anesthesia Evaluation     . Pt has had prior anesthetic. Type: MAC    No history of anesthetic complications          ROS/MED HX    ENT/Pulmonary:     (+)allergic rhinitis, tobacco use, Past use Intermittent asthma Treatment: Inhaler daily,  , . .    Neurologic:     (+)migraines,     Cardiovascular:  - neg cardiovascular ROS       METS/Exercise Tolerance:     Hematologic:  - neg hematologic  ROS       Musculoskeletal:  - neg musculoskeletal ROS       GI/Hepatic:  - neg GI/hepatic ROS       Renal/Genitourinary:  - ROS Renal section negative       Endo:  - neg endo ROS       Psychiatric:     (+) psychiatric history anxiety      Infectious Disease:  - neg infectious disease ROS       Malignancy:      - no malignancy   Other:    - neg other ROS                      Physical Exam  Normal systems: cardiovascular and pulmonary    Airway   Mallampati: I  TM distance: >3 FB  Neck ROM: full    Dental     Cardiovascular   Rhythm and rate: regular and normal      Pulmonary    breath sounds clear to auscultation            Lab Results   Component Value Date    WBC 6.9 2018    HGB 15.0 2018    HCT 42.3 2018     2018    CRP <2.9 2015     2018    POTASSIUM 3.7 2018    CHLORIDE 106 2018    CO2 28 2018    BUN 12 2018    CR 0.63 (L) 2018    GLC 89 2018    JOHN 8.4 (L) 2018    MAG 1.9 2014    ALBUMIN 4.5 2018    PROTTOTAL 7.6 2018    ALT 32 2018    AST 16 2018    ALKPHOS  "108 08/01/2018    BILITOTAL 1.2 08/01/2018    TSH 2.59 08/01/2018       Preop Vitals  BP Readings from Last 3 Encounters:   08/06/20 128/68   05/10/20 121/85   10/01/19 116/64    Pulse Readings from Last 3 Encounters:   08/06/20 75   05/10/20 78   10/01/19 64      Resp Readings from Last 3 Encounters:   05/10/20 16   10/01/19 12   08/06/19 12    SpO2 Readings from Last 3 Encounters:   08/06/20 98%   05/10/20 97%   10/01/19 98%      Temp Readings from Last 1 Encounters:   08/06/20 98.2  F (36.8  C) (Tympanic)    Ht Readings from Last 1 Encounters:   08/06/20 1.905 m (6' 3\")      Wt Readings from Last 1 Encounters:   08/06/20 95.3 kg (210 lb)    Estimated body mass index is 26.25 kg/m  as calculated from the following:    Height as of 8/6/20: 1.905 m (6' 3\").    Weight as of 8/6/20: 95.3 kg (210 lb).       Anesthesia Plan      History & Physical Review  History and physical reviewed and following examination; no interval change.    ASA Status:  2 .    NPO Status:  > 8 hours    Plan for General with Intravenous and Propofol induction. Maintenance will be Inhalation.    PONV prophylaxis:  Ondansetron (or other 5HT-3) and Dexamethasone or Solumedrol  H and P date 8/20/20        Postoperative Care  Postoperative pain management:  IV analgesics.      Consents  Anesthetic plan, risks, benefits and alternatives discussed with:  Patient.  Use of blood products discussed: No .   .                 Emily Green, ZOHRA CRNA  "

## 2020-08-20 ENCOUNTER — OFFICE VISIT (OUTPATIENT)
Dept: FAMILY MEDICINE | Facility: OTHER | Age: 20
End: 2020-08-20
Attending: FAMILY MEDICINE
Payer: COMMERCIAL

## 2020-08-20 VITALS
WEIGHT: 206 LBS | HEIGHT: 75 IN | DIASTOLIC BLOOD PRESSURE: 60 MMHG | BODY MASS INDEX: 25.61 KG/M2 | SYSTOLIC BLOOD PRESSURE: 116 MMHG | TEMPERATURE: 97.8 F | OXYGEN SATURATION: 98 % | HEART RATE: 66 BPM

## 2020-08-20 DIAGNOSIS — Z11.59 SCREENING FOR VIRAL DISEASE: ICD-10-CM

## 2020-08-20 DIAGNOSIS — Z01.818 PRE-OPERATIVE EXAMINATION: ICD-10-CM

## 2020-08-20 DIAGNOSIS — J30.2 SEASONAL ALLERGIC RHINITIS, UNSPECIFIED TRIGGER: ICD-10-CM

## 2020-08-20 DIAGNOSIS — J32.9 CHRONIC RECURRENT SINUSITIS: ICD-10-CM

## 2020-08-20 DIAGNOSIS — Z01.818 PREOP GENERAL PHYSICAL EXAM: Primary | ICD-10-CM

## 2020-08-20 DIAGNOSIS — F17.200 TOBACCO USE DISORDER: ICD-10-CM

## 2020-08-20 DIAGNOSIS — J34.2 DEVIATED NASAL SEPTUM: ICD-10-CM

## 2020-08-20 DIAGNOSIS — J34.1 MUCOUS RETENTION CYST OF MAXILLARY SINUS: ICD-10-CM

## 2020-08-20 DIAGNOSIS — J32.9 CHRONIC CONGESTION OF PARANASAL SINUS: ICD-10-CM

## 2020-08-20 LAB
ANION GAP SERPL CALCULATED.3IONS-SCNC: 8 MMOL/L (ref 3–14)
BUN SERPL-MCNC: 14 MG/DL (ref 7–30)
CALCIUM SERPL-MCNC: 9.2 MG/DL (ref 8.5–10.1)
CHLORIDE SERPL-SCNC: 103 MMOL/L (ref 94–109)
CO2 SERPL-SCNC: 26 MMOL/L (ref 20–32)
CREAT SERPL-MCNC: 0.75 MG/DL (ref 0.66–1.25)
ERYTHROCYTE [DISTWIDTH] IN BLOOD BY AUTOMATED COUNT: 11.9 % (ref 10–15)
GFR SERPL CREATININE-BSD FRML MDRD: >90 ML/MIN/{1.73_M2}
GLUCOSE SERPL-MCNC: 138 MG/DL (ref 70–99)
HCT VFR BLD AUTO: 42.3 % (ref 40–53)
HGB BLD-MCNC: 14.5 G/DL (ref 13.3–17.7)
MCH RBC QN AUTO: 30.1 PG (ref 26.5–33)
MCHC RBC AUTO-ENTMCNC: 34.3 G/DL (ref 31.5–36.5)
MCV RBC AUTO: 88 FL (ref 78–100)
PLATELET # BLD AUTO: 188 10E9/L (ref 150–450)
POTASSIUM SERPL-SCNC: 3.8 MMOL/L (ref 3.4–5.3)
RBC # BLD AUTO: 4.82 10E12/L (ref 4.4–5.9)
SODIUM SERPL-SCNC: 137 MMOL/L (ref 133–144)
WBC # BLD AUTO: 6.4 10E9/L (ref 4–11)

## 2020-08-20 PROCEDURE — 86769 SARS-COV-2 COVID-19 ANTIBODY: CPT | Performed by: FAMILY MEDICINE

## 2020-08-20 PROCEDURE — 99213 OFFICE O/P EST LOW 20 MIN: CPT | Performed by: FAMILY MEDICINE

## 2020-08-20 PROCEDURE — 85027 COMPLETE CBC AUTOMATED: CPT | Performed by: FAMILY MEDICINE

## 2020-08-20 PROCEDURE — 36415 COLL VENOUS BLD VENIPUNCTURE: CPT | Performed by: FAMILY MEDICINE

## 2020-08-20 PROCEDURE — U0003 INFECTIOUS AGENT DETECTION BY NUCLEIC ACID (DNA OR RNA); SEVERE ACUTE RESPIRATORY SYNDROME CORONAVIRUS 2 (SARS-COV-2) (CORONAVIRUS DISEASE [COVID-19]), AMPLIFIED PROBE TECHNIQUE, MAKING USE OF HIGH THROUGHPUT TECHNOLOGIES AS DESCRIBED BY CMS-2020-01-R: HCPCS | Performed by: OTOLARYNGOLOGY

## 2020-08-20 PROCEDURE — 80048 BASIC METABOLIC PNL TOTAL CA: CPT | Performed by: FAMILY MEDICINE

## 2020-08-20 RX ORDER — LORATADINE 10 MG/1
10 TABLET ORAL DAILY
COMMUNITY
End: 2022-12-16

## 2020-08-20 RX ORDER — FAMOTIDINE 20 MG
1 TABLET ORAL
COMMUNITY
End: 2022-02-18

## 2020-08-20 RX ORDER — MULTIPLE VITAMINS W/ MINERALS TAB 9MG-400MCG
1 TAB ORAL DAILY
COMMUNITY
End: 2022-02-18

## 2020-08-20 ASSESSMENT — ANXIETY QUESTIONNAIRES
4. TROUBLE RELAXING: NOT AT ALL
7. FEELING AFRAID AS IF SOMETHING AWFUL MIGHT HAPPEN: NOT AT ALL
5. BEING SO RESTLESS THAT IT IS HARD TO SIT STILL: NOT AT ALL
GAD7 TOTAL SCORE: 0
3. WORRYING TOO MUCH ABOUT DIFFERENT THINGS: NOT AT ALL
6. BECOMING EASILY ANNOYED OR IRRITABLE: NOT AT ALL
2. NOT BEING ABLE TO STOP OR CONTROL WORRYING: NOT AT ALL
1. FEELING NERVOUS, ANXIOUS, OR ON EDGE: NOT AT ALL

## 2020-08-20 ASSESSMENT — ASTHMA QUESTIONNAIRES
QUESTION_1 LAST FOUR WEEKS HOW MUCH OF THE TIME DID YOUR ASTHMA KEEP YOU FROM GETTING AS MUCH DONE AT WORK, SCHOOL OR AT HOME: NONE OF THE TIME
EMERGENCY_ROOM_LAST_YEAR_TOTAL: ONE
QUESTION_5 LAST FOUR WEEKS HOW WOULD YOU RATE YOUR ASTHMA CONTROL: WELL CONTROLLED
ACT_TOTALSCORE: 23
QUESTION_4 LAST FOUR WEEKS HOW OFTEN HAVE YOU USED YOUR RESCUE INHALER OR NEBULIZER MEDICATION (SUCH AS ALBUTEROL): ONCE A WEEK OR LESS
QUESTION_2 LAST FOUR WEEKS HOW OFTEN HAVE YOU HAD SHORTNESS OF BREATH: NOT AT ALL
QUESTION_3 LAST FOUR WEEKS HOW OFTEN DID YOUR ASTHMA SYMPTOMS (WHEEZING, COUGHING, SHORTNESS OF BREATH, CHEST TIGHTNESS OR PAIN) WAKE YOU UP AT NIGHT OR EARLIER THAN USUAL IN THE MORNING: NOT AT ALL

## 2020-08-20 ASSESSMENT — PAIN SCALES - GENERAL: PAINLEVEL: MILD PAIN (2)

## 2020-08-20 ASSESSMENT — MIFFLIN-ST. JEOR: SCORE: 2020.04

## 2020-08-20 ASSESSMENT — PATIENT HEALTH QUESTIONNAIRE - PHQ9: SUM OF ALL RESPONSES TO PHQ QUESTIONS 1-9: 3

## 2020-08-20 NOTE — LETTER
My Asthma Action Plan    Name: Hema Rico   YOB: 2000  Date: 8/20/2020   My doctor: Dixie Velásquez MD   [facility]        My Rescue Medicine:   Albuterol inhaler (Proair/Ventolin/Proventil HFA)  2-4 puffs EVERY 4 HOURS as needed. Use a spacer if recommended by your provider.   My Asthma Severity:   Intermittent / Exercise Induced  Know your asthma triggers: upper respiratory infections, dust mites, mold, humidity and occupational exposure             GREEN ZONE   Good Control    I feel good    No cough or wheeze    Can work, sleep and play without asthma symptoms       Take your asthma control medicine every day.     1. If exercise triggers your asthma, take your rescue medication    15 minutes before exercise or sports, and    During exercise if you have asthma symptoms  2. Spacer to use with inhaler: If you have a spacer, make sure to use it with your inhaler             YELLOW ZONE Getting Worse  I have ANY of these:    I do not feel good    Cough or wheeze    Chest feels tight    Wake up at night   1. Keep taking your Green Zone medications  2. Start taking your rescue medicine:    every 20 minutes for up to 1 hour. Then every 4 hours for 24-48 hours.  3. If you stay in the Yellow Zone for more than 12-24 hours, contact your doctor.  4. If you do not return to the Green Zone in 12-24 hours or you get worse, start taking your oral steroid medicine if prescribed by your provider.           RED ZONE Medical Alert - Get Help  I have ANY of these:    I feel awful    Medicine is not helping    Breathing getting harder    Trouble walking or talking    Nose opens wide to breathe       1. Take your rescue medicine NOW  2. If your provider has prescribed an oral steroid medicine, start taking it NOW  3. Call your doctor NOW  4. If you are still in the Red Zone after 20 minutes and you have not reached your doctor:    Take your rescue medicine again and    Call  911 or go to the emergency room right away    See your regular doctor within 2 weeks of an Emergency Room or Urgent Care visit for follow-up treatment.          Annual Reminders:  Meet with Asthma Educator,  Flu Shot in the Fall, consider Pneumonia Vaccination for patients with asthma (aged 19 and older).    Pharmacy:    [id number] - [place], [place] - [address] AT [facility] OF  [address]  [facility] - [place], [place] - [address]     Electronically signed by Dixie Velásquez MD   Date: 08/20/20                    Asthma Triggers  How To Control Things That Make Your Asthma Worse    Triggers are things that make your asthma worse.  Look at the list below to help you find your triggers and   what you can do about them. You can help prevent asthma flare-ups by staying away from your triggers.      Trigger                                                          What you can do   Cigarette Smoke  Tobacco smoke can make asthma worse. Do not allow smoking in your home, car or around you.  Be sure no one smokes at a child s day care or school.  If you smoke, ask your health care provider for ways to help you quit.  Ask family members to quit too.  Ask your health care provider for a referral to Quit Plan to help you quit smoking, or call 5-782-798-PLAN.     Colds, Flu, Bronchitis  These are common triggers of asthma. Wash your hands often.  Don t touch your eyes, nose or mouth.  Get a flu shot every year.     Dust Mites  These are tiny bugs that live in cloth or carpet. They are too small to see. Wash sheets and blankets in hot water every week.   Encase pillows and mattress in dust mite proof covers.  Avoid having carpet if you can. If you have carpet, vacuum weekly.   Use a dust mask and HEPA vacuum.   Pollen and Outdoor Mold  Some people are allergic to trees, grass, or weed pollen, or molds. Try to keep your windows closed.  Limit time out doors when pollen count is high.   Ask you health  care provider about taking medicine during allergy season.     Animal Dander  Some people are allergic to skin flakes, urine or saliva from pets with fur or feathers. Keep pets with fur or feathers out of your home.    If you can t keep the pet outdoors, then keep the pet out of your bedroom.  Keep the bedroom door closed.  Keep pets off cloth furniture and away from stuffed toys.     Mice, Rats, and Cockroaches  Some people are allergic to the waste from these pests.   Cover food and garbage.  Clean up spills and food crumbs.  Store grease in the refrigerator.   Keep food out of the bedroom.   Indoor Mold  This can be a trigger if your home has high moisture. Fix leaking faucets, pipes, or other sources of water.   Clean moldy surfaces.  Dehumidify basement if it is damp and smelly.   Smoke, Strong Odors, and Sprays  These can reduce air quality. Stay away from strong odors and sprays, such as perfume, powder, hair spray, paints, smoke incense, paint, cleaning products, candles and new carpet.   Exercise or Sports  Some people with asthma have this trigger. Be active!  Ask your doctor about taking medicine before sports or exercise to prevent symptoms.    Warm up for 5-10 minutes before and after sports or exercise.     Other Triggers of Asthma  Cold air:  Cover your nose and mouth with a scarf.  Sometimes laughing or crying can be a trigger.  Some medicines and food can trigger asthma.

## 2020-08-20 NOTE — NURSING NOTE
"Chief Complaint   Patient presents with     Pre-Op Exam       Initial /60 (BP Location: Left arm, Patient Position: Sitting, Cuff Size: Adult Regular)   Pulse 66   Temp 97.8  F (36.6  C) (Tympanic)   Ht 1.905 m (6' 3\")   Wt 93.4 kg (206 lb)   SpO2 98%   BMI 25.75 kg/m   Estimated body mass index is 25.75 kg/m  as calculated from the following:    Height as of this encounter: 1.905 m (6' 3\").    Weight as of this encounter: 93.4 kg (206 lb).  Medication Reconciliation: complete  Christine Collins LPN  "

## 2020-08-21 ASSESSMENT — ANXIETY QUESTIONNAIRES: GAD7 TOTAL SCORE: 0

## 2020-08-21 ASSESSMENT — ASTHMA QUESTIONNAIRES: ACT_TOTALSCORE: 23

## 2020-08-22 ENCOUNTER — OFFICE VISIT (OUTPATIENT)
Dept: FAMILY MEDICINE | Facility: OTHER | Age: 20
End: 2020-08-22
Attending: PHYSICIAN ASSISTANT
Payer: COMMERCIAL

## 2020-08-22 DIAGNOSIS — Z01.818 PRE-OP EXAM: Primary | ICD-10-CM

## 2020-08-23 LAB
SARS-COV-2 RNA SPEC QL NAA+PROBE: NORMAL
SPECIMEN SOURCE: NORMAL

## 2020-08-24 LAB
COVID-19 ANTIBODY IGG: NEGATIVE
LAB TEST METHOD: NORMAL

## 2020-08-25 ENCOUNTER — TELEPHONE (OUTPATIENT)
Dept: OTOLARYNGOLOGY | Facility: OTHER | Age: 20
End: 2020-08-25

## 2020-08-25 LAB
LABORATORY COMMENT REPORT: NORMAL
SARS-COV-2 RNA SPEC QL NAA+PROBE: NEGATIVE
SARS-COV-2 RNA SPEC QL NAA+PROBE: NORMAL
SPECIMEN SOURCE: NORMAL
SPECIMEN SOURCE: NORMAL

## 2020-08-25 NOTE — TELEPHONE ENCOUNTER
Patient calling and states he received a call from the surgery dept nurse stating he did not do his covid test. Patient stated he had the serology test done on 08/20 and then had the nasal swab done on 08/22. Both results are back and state negative. Is patient still able to get his surgery tomorrow?  Please advise, thank you.    Patient's phone number is [phone]

## 2020-08-25 NOTE — TELEPHONE ENCOUNTER
I spoke with Judith in Surgery Education and she states that the lab was entered on the wrong date.  This has been fixed.  I called pt and let him know that he will still have surgery tomorrow.

## 2020-08-26 ENCOUNTER — ANESTHESIA (OUTPATIENT)
Dept: SURGERY | Facility: HOSPITAL | Age: 20
End: 2020-08-26
Payer: COMMERCIAL

## 2020-08-26 ENCOUNTER — HOSPITAL ENCOUNTER (OUTPATIENT)
Facility: HOSPITAL | Age: 20
Discharge: HOME | End: 2020-08-26
Attending: OTOLARYNGOLOGY | Admitting: OTOLARYNGOLOGY
Payer: COMMERCIAL

## 2020-08-26 VITALS
HEIGHT: 76 IN | OXYGEN SATURATION: 96 % | RESPIRATION RATE: 18 BRPM | DIASTOLIC BLOOD PRESSURE: 92 MMHG | TEMPERATURE: 97 F | BODY MASS INDEX: 24.36 KG/M2 | SYSTOLIC BLOOD PRESSURE: 137 MMHG | HEART RATE: 68 BPM | WEIGHT: 200 LBS

## 2020-08-26 DIAGNOSIS — J34.2 DEVIATED NASAL SEPTUM: ICD-10-CM

## 2020-08-26 DIAGNOSIS — J34.3 NASAL TURBINATE HYPERTROPHY: ICD-10-CM

## 2020-08-26 DIAGNOSIS — Z98.890 S/P FESS (FUNCTIONAL ENDOSCOPIC SINUS SURGERY): Primary | ICD-10-CM

## 2020-08-26 DIAGNOSIS — J32.9 CHRONIC RECURRENT SINUSITIS: ICD-10-CM

## 2020-08-26 PROCEDURE — 25800030 ZZH RX IP 258 OP 636: Performed by: NURSE ANESTHETIST, CERTIFIED REGISTERED

## 2020-08-26 PROCEDURE — 25000125 ZZHC RX 250: Performed by: NURSE ANESTHETIST, CERTIFIED REGISTERED

## 2020-08-26 PROCEDURE — 25000132 ZZH RX MED GY IP 250 OP 250 PS 637: Performed by: OTOLARYNGOLOGY

## 2020-08-26 PROCEDURE — 25000128 H RX IP 250 OP 636: Performed by: NURSE ANESTHETIST, CERTIFIED REGISTERED

## 2020-08-26 PROCEDURE — 25000125 ZZHC RX 250: Performed by: OTOLARYNGOLOGY

## 2020-08-26 PROCEDURE — 25000128 H RX IP 250 OP 636: Performed by: OTOLARYNGOLOGY

## 2020-08-26 PROCEDURE — 88304 TISSUE EXAM BY PATHOLOGIST: CPT | Mod: TC | Performed by: OTOLARYNGOLOGY

## 2020-08-26 PROCEDURE — 37000009 ZZH ANESTHESIA TECHNICAL FEE, EACH ADDTL 15 MIN: Performed by: OTOLARYNGOLOGY

## 2020-08-26 PROCEDURE — 30520 REPAIR OF NASAL SEPTUM: CPT | Performed by: OTOLARYNGOLOGY

## 2020-08-26 PROCEDURE — 40000305 ZZH STATISTIC PRE PROC ASSESS I: Performed by: OTOLARYNGOLOGY

## 2020-08-26 PROCEDURE — 25000132 ZZH RX MED GY IP 250 OP 250 PS 637: Performed by: NURSE ANESTHETIST, CERTIFIED REGISTERED

## 2020-08-26 PROCEDURE — 27110028 ZZH OR GENERAL SUPPLY NON-STERILE: Performed by: OTOLARYNGOLOGY

## 2020-08-26 PROCEDURE — 71000014 ZZH RECOVERY PHASE 1 LEVEL 2 FIRST HR: Performed by: OTOLARYNGOLOGY

## 2020-08-26 PROCEDURE — 71000027 ZZH RECOVERY PHASE 2 EACH 15 MINS: Performed by: OTOLARYNGOLOGY

## 2020-08-26 PROCEDURE — 31255 NSL/SINS NDSC W/TOT ETHMDCT: CPT | Mod: 50 | Performed by: OTOLARYNGOLOGY

## 2020-08-26 PROCEDURE — 25000566 ZZH SEVOFLURANE, EA 15 MIN: Performed by: NURSE ANESTHETIST, CERTIFIED REGISTERED

## 2020-08-26 PROCEDURE — C9046 COCAINE HCL NASAL SOLUTION: HCPCS | Performed by: OTOLARYNGOLOGY

## 2020-08-26 PROCEDURE — 27210794 ZZH OR GENERAL SUPPLY STERILE: Performed by: OTOLARYNGOLOGY

## 2020-08-26 PROCEDURE — C1726 CATH, BAL DIL, NON-VASCULAR: HCPCS | Performed by: OTOLARYNGOLOGY

## 2020-08-26 PROCEDURE — 37000008 ZZH ANESTHESIA TECHNICAL FEE, 1ST 30 MIN: Performed by: OTOLARYNGOLOGY

## 2020-08-26 PROCEDURE — 31276 NSL/SINS NDSC FRNT TISS RMVL: CPT | Performed by: NURSE ANESTHETIST, CERTIFIED REGISTERED

## 2020-08-26 PROCEDURE — 36000058 ZZH SURGERY LEVEL 3 EA 15 ADDTL MIN: Performed by: OTOLARYNGOLOGY

## 2020-08-26 PROCEDURE — 31240 NSL/SNS NDSC CNCH BULL RESCJ: CPT | Mod: LT | Performed by: OTOLARYNGOLOGY

## 2020-08-26 PROCEDURE — 71000015 ZZH RECOVERY PHASE 1 LEVEL 2 EA ADDTL HR: Performed by: OTOLARYNGOLOGY

## 2020-08-26 PROCEDURE — 36000056 ZZH SURGERY LEVEL 3 1ST 30 MIN: Performed by: OTOLARYNGOLOGY

## 2020-08-26 PROCEDURE — S1090 MOMETASONE SINUS IMPLANT: HCPCS | Performed by: OTOLARYNGOLOGY

## 2020-08-26 PROCEDURE — 30930 THER FX NASAL INF TURBINATE: CPT | Performed by: OTOLARYNGOLOGY

## 2020-08-26 DEVICE — PROPEL-MINI DISSOLVABLE: Type: IMPLANTABLE DEVICE | Site: NOSE | Status: FUNCTIONAL

## 2020-08-26 RX ORDER — TRIAMCINOLONE ACETONIDE 40 MG/ML
INJECTION, SUSPENSION INTRA-ARTICULAR; INTRAMUSCULAR PRN
Status: DISCONTINUED | OUTPATIENT
Start: 2020-08-26 | End: 2020-08-26 | Stop reason: HOSPADM

## 2020-08-26 RX ORDER — NALOXONE HYDROCHLORIDE 0.4 MG/ML
.1-.4 INJECTION, SOLUTION INTRAMUSCULAR; INTRAVENOUS; SUBCUTANEOUS
Status: DISCONTINUED | OUTPATIENT
Start: 2020-08-26 | End: 2020-08-26 | Stop reason: HOSPADM

## 2020-08-26 RX ORDER — ALBUTEROL SULFATE 0.83 MG/ML
2.5 SOLUTION RESPIRATORY (INHALATION) EVERY 4 HOURS PRN
Status: DISCONTINUED | OUTPATIENT
Start: 2020-08-26 | End: 2020-08-26 | Stop reason: HOSPADM

## 2020-08-26 RX ORDER — HYDROMORPHONE HYDROCHLORIDE 1 MG/ML
.3-.5 INJECTION, SOLUTION INTRAMUSCULAR; INTRAVENOUS; SUBCUTANEOUS EVERY 10 MIN PRN
Status: DISCONTINUED | OUTPATIENT
Start: 2020-08-26 | End: 2020-08-26 | Stop reason: HOSPADM

## 2020-08-26 RX ORDER — OXYMETAZOLINE HYDROCHLORIDE 0.05 G/100ML
3 SPRAY NASAL
Status: COMPLETED | OUTPATIENT
Start: 2020-08-26 | End: 2020-08-26

## 2020-08-26 RX ORDER — DEXAMETHASONE SODIUM PHOSPHATE 10 MG/ML
INJECTION, SOLUTION INTRAMUSCULAR; INTRAVENOUS PRN
Status: DISCONTINUED | OUTPATIENT
Start: 2020-08-26 | End: 2020-08-26

## 2020-08-26 RX ORDER — ONDANSETRON 2 MG/ML
INJECTION INTRAMUSCULAR; INTRAVENOUS PRN
Status: DISCONTINUED | OUTPATIENT
Start: 2020-08-26 | End: 2020-08-26

## 2020-08-26 RX ORDER — BUDESONIDE 0.5 MG/2ML
INHALANT ORAL
Qty: 3 BOX | Refills: 11 | Status: SHIPPED | OUTPATIENT
Start: 2020-08-26 | End: 2020-10-01

## 2020-08-26 RX ORDER — SCOLOPAMINE TRANSDERMAL SYSTEM 1 MG/1
1 PATCH, EXTENDED RELEASE TRANSDERMAL ONCE
Status: DISCONTINUED | OUTPATIENT
Start: 2020-08-26 | End: 2020-08-26 | Stop reason: HOSPADM

## 2020-08-26 RX ORDER — FENTANYL CITRATE 50 UG/ML
INJECTION, SOLUTION INTRAMUSCULAR; INTRAVENOUS PRN
Status: DISCONTINUED | OUTPATIENT
Start: 2020-08-26 | End: 2020-08-26

## 2020-08-26 RX ORDER — HYDROCODONE BITARTRATE AND ACETAMINOPHEN 7.5; 325 MG/1; MG/1
1 TABLET ORAL ONCE
Status: COMPLETED | OUTPATIENT
Start: 2020-08-26 | End: 2020-08-26

## 2020-08-26 RX ORDER — PROPOFOL 10 MG/ML
INJECTION, EMULSION INTRAVENOUS PRN
Status: DISCONTINUED | OUTPATIENT
Start: 2020-08-26 | End: 2020-08-26

## 2020-08-26 RX ORDER — FENTANYL CITRATE 50 UG/ML
25-50 INJECTION, SOLUTION INTRAMUSCULAR; INTRAVENOUS
Status: DISCONTINUED | OUTPATIENT
Start: 2020-08-26 | End: 2020-08-26 | Stop reason: HOSPADM

## 2020-08-26 RX ORDER — MEPERIDINE HYDROCHLORIDE 25 MG/ML
12.5 INJECTION INTRAMUSCULAR; INTRAVENOUS; SUBCUTANEOUS
Status: DISCONTINUED | OUTPATIENT
Start: 2020-08-26 | End: 2020-08-26 | Stop reason: HOSPADM

## 2020-08-26 RX ORDER — LABETALOL 20 MG/4 ML (5 MG/ML) INTRAVENOUS SYRINGE
10
Status: DISCONTINUED | OUTPATIENT
Start: 2020-08-26 | End: 2020-08-26 | Stop reason: HOSPADM

## 2020-08-26 RX ORDER — HYDROCODONE BITARTRATE AND ACETAMINOPHEN 7.5; 325 MG/1; MG/1
1 TABLET ORAL EVERY 6 HOURS PRN
Qty: 10 TABLET | Refills: 0 | Status: SHIPPED | OUTPATIENT
Start: 2020-08-26 | End: 2020-08-29

## 2020-08-26 RX ORDER — SOD CHLOR,BICARB/SQUEEZ BOTTLE
PACKET, WITH RINSE DEVICE NASAL
Qty: 1 EACH | Status: SHIPPED | OUTPATIENT
Start: 2020-08-26 | End: 2020-10-01

## 2020-08-26 RX ORDER — ONDANSETRON 2 MG/ML
4 INJECTION INTRAMUSCULAR; INTRAVENOUS EVERY 30 MIN PRN
Status: COMPLETED | OUTPATIENT
Start: 2020-08-26 | End: 2020-08-26

## 2020-08-26 RX ORDER — COCAINE HYDROCHLORIDE 40 MG/ML
SOLUTION NASAL
Status: DISCONTINUED
Start: 2020-08-26 | End: 2020-08-26 | Stop reason: HOSPADM

## 2020-08-26 RX ORDER — ONDANSETRON 4 MG/1
4 TABLET, ORALLY DISINTEGRATING ORAL EVERY 30 MIN PRN
Status: COMPLETED | OUTPATIENT
Start: 2020-08-26 | End: 2020-08-26

## 2020-08-26 RX ORDER — LIDOCAINE HYDROCHLORIDE 20 MG/ML
INJECTION, SOLUTION INFILTRATION; PERINEURAL PRN
Status: DISCONTINUED | OUTPATIENT
Start: 2020-08-26 | End: 2020-08-26

## 2020-08-26 RX ORDER — ALBUTEROL SULFATE 90 UG/1
AEROSOL, METERED RESPIRATORY (INHALATION) PRN
Status: DISCONTINUED | OUTPATIENT
Start: 2020-08-26 | End: 2020-08-26

## 2020-08-26 RX ORDER — LIDOCAINE 40 MG/G
CREAM TOPICAL
Status: DISCONTINUED | OUTPATIENT
Start: 2020-08-26 | End: 2020-08-26 | Stop reason: HOSPADM

## 2020-08-26 RX ORDER — PREDNISONE 20 MG/1
TABLET ORAL
Qty: 5 TABLET | Refills: 0 | Status: SHIPPED | OUTPATIENT
Start: 2020-08-27 | End: 2020-10-01

## 2020-08-26 RX ORDER — SODIUM CHLORIDE, SODIUM LACTATE, POTASSIUM CHLORIDE, CALCIUM CHLORIDE 600; 310; 30; 20 MG/100ML; MG/100ML; MG/100ML; MG/100ML
INJECTION, SOLUTION INTRAVENOUS CONTINUOUS
Status: DISCONTINUED | OUTPATIENT
Start: 2020-08-26 | End: 2020-08-26 | Stop reason: HOSPADM

## 2020-08-26 RX ORDER — COCAINE HYDROCHLORIDE 40 MG/ML
SOLUTION NASAL PRN
Status: DISCONTINUED | OUTPATIENT
Start: 2020-08-26 | End: 2020-08-26 | Stop reason: HOSPADM

## 2020-08-26 RX ORDER — LIDOCAINE HYDROCHLORIDE AND EPINEPHRINE 10; 10 MG/ML; UG/ML
INJECTION, SOLUTION INFILTRATION; PERINEURAL PRN
Status: DISCONTINUED | OUTPATIENT
Start: 2020-08-26 | End: 2020-08-26 | Stop reason: HOSPADM

## 2020-08-26 RX ADMIN — OXYMETAZOLINE HCL 3 SPRAY: 0.05 SPRAY NASAL at 12:07

## 2020-08-26 RX ADMIN — ALBUTEROL SULFATE 2 PUFF: 90 AEROSOL, METERED RESPIRATORY (INHALATION) at 12:35

## 2020-08-26 RX ADMIN — FENTANYL CITRATE 50 MCG: 50 INJECTION, SOLUTION INTRAMUSCULAR; INTRAVENOUS at 13:27

## 2020-08-26 RX ADMIN — LIDOCAINE HYDROCHLORIDE 40 MG: 20 INJECTION, SOLUTION INFILTRATION; PERINEURAL at 12:37

## 2020-08-26 RX ADMIN — SODIUM CHLORIDE, POTASSIUM CHLORIDE, SODIUM LACTATE AND CALCIUM CHLORIDE: 600; 310; 30; 20 INJECTION, SOLUTION INTRAVENOUS at 13:56

## 2020-08-26 RX ADMIN — Medication 100 MG: at 12:37

## 2020-08-26 RX ADMIN — OXYMETAZOLINE HCL 3 SPRAY: 0.05 SPRAY NASAL at 12:17

## 2020-08-26 RX ADMIN — DEXAMETHASONE SODIUM PHOSPHATE 12 MG: 10 INJECTION, SOLUTION INTRAMUSCULAR; INTRAVENOUS at 12:47

## 2020-08-26 RX ADMIN — MIDAZOLAM 2 MG: 1 INJECTION INTRAMUSCULAR; INTRAVENOUS at 12:30

## 2020-08-26 RX ADMIN — OXYMETAZOLINE HCL 3 SPRAY: 0.05 SPRAY NASAL at 11:57

## 2020-08-26 RX ADMIN — HYDROCODONE BITARTRATE AND ACETAMINOPHEN 1 TABLET: 7.5; 325 TABLET ORAL at 17:35

## 2020-08-26 RX ADMIN — SCOPALAMINE 1 PATCH: 1 PATCH, EXTENDED RELEASE TRANSDERMAL at 16:43

## 2020-08-26 RX ADMIN — SODIUM CHLORIDE, POTASSIUM CHLORIDE, SODIUM LACTATE AND CALCIUM CHLORIDE: 600; 310; 30; 20 INJECTION, SOLUTION INTRAVENOUS at 12:19

## 2020-08-26 RX ADMIN — ONDANSETRON 4 MG: 2 INJECTION INTRAMUSCULAR; INTRAVENOUS at 16:04

## 2020-08-26 RX ADMIN — FENTANYL CITRATE 100 MCG: 50 INJECTION, SOLUTION INTRAMUSCULAR; INTRAVENOUS at 12:30

## 2020-08-26 RX ADMIN — ONDANSETRON 4 MG: 2 INJECTION INTRAMUSCULAR; INTRAVENOUS at 12:30

## 2020-08-26 RX ADMIN — FENTANYL CITRATE 100 MCG: 50 INJECTION, SOLUTION INTRAMUSCULAR; INTRAVENOUS at 12:58

## 2020-08-26 RX ADMIN — PROCHLORPERAZINE EDISYLATE 10 MG: 5 INJECTION INTRAMUSCULAR; INTRAVENOUS at 15:48

## 2020-08-26 RX ADMIN — ONDANSETRON 4 MG: 2 INJECTION INTRAMUSCULAR; INTRAVENOUS at 15:33

## 2020-08-26 RX ADMIN — FENTANYL CITRATE 50 MCG: 50 INJECTION, SOLUTION INTRAMUSCULAR; INTRAVENOUS at 13:29

## 2020-08-26 RX ADMIN — PROPOFOL 250 MG: 10 INJECTION, EMULSION INTRAVENOUS at 12:37

## 2020-08-26 ASSESSMENT — MIFFLIN-ST. JEOR: SCORE: 2008.69

## 2020-08-26 NOTE — SIGNIFICANT EVENT
Given compazine   For continued complaint of nausea see mar  Does state nausea is better  After  First medicine

## 2020-08-26 NOTE — ANESTHESIA POSTPROCEDURE EVALUATION
Patient: Hema Rico    Procedure(s):  BILATERAL ENDOSCOPIC SINUS SURGERY WITH PROPEL  SEPTOPLASTY, TURBINATE REDUCTION    Diagnosis:Chronic recurrent sinusitis [J32.9]  Deviated nasal septum [J34.2]  Nasal turbinate hypertrophy [J34.3]  Diagnosis Additional Information: No value filed.    Anesthesia Type:  General    Note:  Anesthesia Post Evaluation    Patient location during evaluation: Bedside  Patient participation: Unable to evaluate secondary to administered sedation  Level of consciousness: responsive to verbal stimuli and responsive to physical stimuli  Pain management: unable to assess  Airway patency: patent  Cardiovascular status: acceptable and stable  Respiratory status: acceptable and face mask  Hydration status: acceptable  PONV: unable to assess     Anesthetic complications: None          Last vitals:  Vitals:    08/26/20 1510 08/26/20 1515 08/26/20 1520   BP: 108/59 111/58 114/66   Pulse: 86 87 83   Resp:      Temp:      SpO2: 96% 94% 95%         Electronically Signed By: ZOHRA Velasquez CRNA  August 26, 2020  3:22 PM

## 2020-08-26 NOTE — SIGNIFICANT EVENT
Given zofran for continued complaint of  Nausea   Its only slightly better per patient  Resting quietly in bed at this time  Dry heaving has stopped

## 2020-08-26 NOTE — ANESTHESIA CARE TRANSFER NOTE
Patient: Hema Rico    Procedure(s):  BILATERAL ENDOSCOPIC SINUS SURGERY WITH PROPEL  SEPTOPLASTY, TURBINATE REDUCTION    Diagnosis: Chronic recurrent sinusitis [J32.9]  Deviated nasal septum [J34.2]  Nasal turbinate hypertrophy [J34.3]  Diagnosis Additional Information: No value filed.    Anesthesia Type:   General     Note:  Airway :Face Mask  Patient transferred to:PACU  Handoff Report: Identifed the Patient, Identified the Reponsible Provider, Reviewed the pertinent medical history, Discussed the surgical course, Reviewed Intra-OP anesthesia mangement and issues during anesthesia, Set expectations for post-procedure period and Allowed opportunity for questions and acknowledgement of understanding      Vitals: (Last set prior to Anesthesia Care Transfer)    CRNA VITALS  8/26/2020 1445 - 8/26/2020 1515      8/26/2020             Resp Rate (observed):  (!) 4    Resp Rate (set):  8                Electronically Signed By: ZOHRA Chen CRNA  August 26, 2020  3:15 PM

## 2020-08-26 NOTE — OR NURSING
Wants to go home.Tolerating water and cracker.States nausea improved.Nasal sling changed with small amt bloody drainage.Patient and responsible adult given discharge instructions with no questions regarding instructions. Raimundo score 19 Pain level 4/10.  Discharged from unit via w/c. Patient discharged to home.

## 2020-08-26 NOTE — DISCHARGE INSTRUCTIONS
Remove the scopolamine patch behind your left ear after 24 hours after application remove at 5 p.m. on 8-27-20 .   After removing the patch, wash your hands and the area behind your ear thoroughly with soap and water.   The patch will still contain some medicine after use.   To avoid accidental contact or ingestion by children or pets, fold the used patch in half with the sticky side together and throw away in the trash out of the reach of children and pets.         Post-Anesthesia Patient Instructions    IMMEDIATELY FOLLOWING SURGERY:  Do not drive or operate machinery for the first twenty four hours after surgery.  Do not make any important decisions for twenty four hours after surgery or while taking narcotic pain medications or sedatives.  If you develop intractable nausea and vomiting or a severe headache please notify your doctor immediately.    FOLLOW-UP:  Please make an appointment with your surgeon as instructed. You do not need to follow up with anesthesia unless specifically instructed to do so.    WOUND CARE INSTRUCTIONS (if applicable):  Keep a dry clean dressing on the anesthesia/puncture wound site if there is drainage.  Once the wound has quit draining you may leave it open to air.  Generally you should leave the bandage intact for twenty four hours unless there is drainage.  If the epidural site drains for more than 36-48 hours please call the anesthesia department.    QUESTIONS?:  Please feel free to call your physician or the hospital  if you have any questions, and they will be happy to assist you.   Instructions for Sinus Surgery    Update:  Please let mom know that I sent Hema's meds to [facility] because he needed more salt packets today for his budesonide irrigations and I had ordered all meds before I talked to mom.  Call her and tell her they will be here for her to  so she doesn't go to [facility].  Thanks.  You can then delete this from instructions.    Recovery -  Everyone recovers differently from a general anesthetic.  Symptoms such as fatigue, nausea, light-headedness, and sometimes a low grade fever (up to 100 degrees) are not unusual.  As your body removes the anesthetic drugs from circulation, these symptoms will resolve.  Your nose will be sore after surgery, and you may even have symptoms similar to a sinus infection with headache, congestion, and pressure.  These will resolve with healing.  For several days you may experience bloody drainage from the nose, please use the drip pad as necessary for this.  If there is persistent bleeding, please call the office during business hours or the on call ENT physician after hours.  There are no diet restrictions after sinus surgery, and you can resume your home medications.      Please do not blow your nose until 1 week after surgery.  At 1 week you may gently blow your nose, unless otherwise indicated by Dr. Mejía.     Limit your activity to no strenuous activities until I see you for the first follow-up visit in approximately 2 weeks.      Medications - You were sent home with narcotic pain medication.  If you can tolerate the discomfort during your recovery by using just plain Tylenol or ibuprofen (advil), please do so.  However, do not hesitate to use the stronger pain medication if needed.  If you were sent home with an antibiotic, it is primarily used to help the healing process.  If it causes loose bowel movements or other signs of intolerance, it is appropriate to discontinue it.  By far the most important measure you can take to speed recovery, and maximize the chances of long term success of sinus surgery is using the sinus rinses at least three time per day for the first month after surgery.       Start Roz Med saline irrigation tomorrow and use at least 5 times daily.     I recommend 2 roz med bottles, one to add the budesonide to and irrigate with the budesonide rinse twice a day for 2 months.    In the  other roz med bottle use only the saline solution, and irrigate with this at least 3 additional times daily.    Perform gentle irrigation for the first week.  Starting 1 week after surgery, you should increase the volume of roz med saline irrigation to each nostril, continuing to use the rinses in an alternating fashion at least 5 times daily.  You cannot use too much of the roz med saline, but please limit budesonide rinses to twice daily.    At 2 weeks after surgery, you may also restart nasal steroids (flonase, nasonex, etc).        Complications - Problems related to sinus surgery almost always are detected during the operation, and special instruction will be given in that situation.  However, unexpected things can happen, and are all related to the structures around the sinus cavities.  Symptoms that should alert you to a possible problem include: severe eye pain or eye swelling, persistent heavy bleeding from the nose, and high fevers with headache and neck pain.  Any of these symptoms should be called into my office or to the on call ENT if after hours.  The most common non-emergency complication of sinus surgery is the formation of scar tissue which can re-block the sinuses.  This is addressed below.    Follow-up -  As you have noted, there are quite a few follow-up visits after sinus surgery.  This is done to aggressively manage the most common complication of this technique, which is scar tissue blocking the sinuses.  These visits will require the examination of your nose and possibly removal of crusts of dry mucous and blood, with possible removal of early scar tissue.  Please prepare for these visits by using your sinus rinses.    If there are any questions or issues with the above, or if there are other issues that concern you, always feel free to call the clinic and I am happy to speak with you as soon as I can.    Lianne Mejía D.O.  Otolaryngology/Head and Neck  Surgery  Allergy    [phone] office

## 2020-08-26 NOTE — OP NOTE
Otolaryngology Operative Note     Pre-op Diagnosis: Chronic pansinusitis, deviated nasal septum, bilateral inferior turbinate hypertrophy  Post-op Diagnosis:  same  Procedures:    1.  Bilateral total ethmoidectomy  2.  Bilateral maxillary antrostomy with tissue removal and polypectomy  3.  Endoscopic Septoplasty  4.  Bilateral submucous reduction inferior turbinates  5.  Excision left josh bullosa    Sinus procedures performed with [employer] navigation    Surgeon:  Lianne Mejía D.O.  Anesthesia:  General endotracheal  EBL:  20 ml  Findings: obstructive retention cyst left maxillary sinus , smaller retention cyst along the anterior inferior wall of the right maxillary sinus  Bilateral Stan cells  Polypoid mucosa of the anterior ethmoids bilaterally    Complications:  none  Condition:  stable     Description of the Procedure  After surgical consent was obtained the patient was brought back to the operating room and laid in a comfortable and supine position.  The patient was administered a general anesthetic by a member of anesthesia.  The table was turned 180 degrees.  The patient was draped in the normal clean fashion and a timeout was taken.  The bed was placed into reverse Trendelenburg positioning.  A timeout was taken.  Cocaine pledgets were placed into the nares for several minutes and removed.  The lateral nasal wall, middle turbinates and inferior turbinates were anesthetized with 1% lidocaine with 1-100,000 epinephrine.    I first used a 30 degree endoscope and entered the left nares.  I used the Coblation PTR blade to perform submucosal reduction of the left inferior turbinate which is then outfractured with a Crestline.    Next days a 30 degree endoscope 2 views of polypoid left middle turbinate with a small josh bullosa.  The josh was incised with a sickle blade lateral portion was removed with consul scissors hemostasis achieved with scant use of suction cautery.  Next identified the left  uncinate process which was paradoxical in appearance and somewhat more prominent than expected.  This was incised with a Healy and removed with backbiting Blakesley's.  I used a curved suction with navigation and maxillary sinus seeker to identify the natural ostium of the maxillary sinus which is stenotic secondary to Stan cell.  I used it to relieve a balloon to dilate the left maxillary sinus the balloon was inflated to 12 mm atmospheric pressure and deflated.  The sinus was irrigated with Ancef saline and gently suctioned.  The natural ostia was enlarged with backbiting Blakesley's until I could visualize a large obstructive retention cyst which was removed with maxillary sinus forceps.  Hemostasis was adequate    Next I turned my attention to the ethmoidectomy.  The ethmoid bulla is large and inferior in location this was penetrated inferior medial with a straight suction and removed with the microdebrider blade.  I dissected along the lamina with José-Cut forceps and continued removing bony septations and polypoid mucosa using upbiters and the microdebrider blade.  Entered the ground the melena inferior medial and identified the posterior ethmoid cells.  Identified and preserved the skull base and operated from a posterior to anterior fashion similarly removing septations throughout the posterior ethmoids.  The sinuses were irrigated and gently suctioned hemostasis is adequate.    Next I turned my attention to the right submucosal reduction of the inferior turbinate which was performed in a similar fashion using Coblation.  The turbinate was also outfractured.      I then performed the endoscopic septoplasty.  I used a 0 degree scope and made a left hemitransfixion incision.  I used a William to elevate a mucoperichondrial flap.  I incised the cartilage and then dissected along the right side of the quadrangular cartilage.  Identified the bony spur along the floor which is removed with a D knife V chisel and  José-Cut forceps.  Over 1 cm of a dorsal caudal strut was left intact.  A portion of the vomer and maxillary crest were involved with the bony spur and were similarly removed.  The scope was withdrawn the flaps are examined the septum is intact and midline.  Pre-and postoperative photos were taken.  The incision was closed with a horizontal mattress 4-0 chromic x2.    Next I used a 30 degree endoscope and turned my attention to the right endoscopic sinus surgery.  Right maxillary antrostomy with polypectomy and total ethmoidectomy was performed with similar findings and results.  There was a small retention cyst in the right maxillary sinus which was similarly removed and the anterior ethmoid mucosa was similarly polypoid on the right.    I placed a mini mometasone implant into each maxillary sinus and placed nasal pore soaked in Kenalog into the ethmoid cavity bilaterally.    The sinuses were irrigated with Ancef and saline throughout and gentle suctioning had been performed.  Hemostasis is adequate    He was handed back of anesthesia awakened and brought to recovery in stable condition having tolerated the procedure well.

## 2020-08-28 LAB — COPATH REPORT: NORMAL

## 2020-08-31 ENCOUNTER — TELEPHONE (OUTPATIENT)
Dept: OTOLARYNGOLOGY | Facility: OTHER | Age: 20
End: 2020-08-31

## 2020-08-31 NOTE — TELEPHONE ENCOUNTER
This patient is s/p FESS, Septoplasty, Turbinate Reduction on 8/26/20. He has complaints of severe post nasal drainage causing asthma attacks. He states that last night he had issues for over 2 hours with no relief from rescue medication. He is using Budesonide BID and Matty Med Saline once daily. He denies any out of ordinary pain or bleeding. He describes correct use of nasal rinses. He is wondering if there is anything that may help the asthma attacks. Please Advise.

## 2020-08-31 NOTE — PROGRESS NOTES
Otolaryngology Progress Note          Hema Rico is a 20 year old male  5 days status post endoscopic sinus surgery    Procedures on 8/26/20  1.  Bilateral total ethmoidectomy  2.  Bilateral maxillary antrostomy with tissue removal and polypectomy  3.  Endoscopic Septoplasty  4.  Bilateral submucous reduction inferior turbinates  5.  Excision left josh bullosa    Mini implants used in each maxillary sinus and nasopore into ethmoid cavity    Findings: obstructive retention cyst left maxillary sinus , smaller retention cyst along the anterior inferior wall of the right maxillary sinus  Bilateral Stan cells  Polypoid mucosa of the anterior ethmoids bilaterally    Final path neg for eos, inflammatory polyp      Since surgery he has noted post nasal drainage  When supine this trigger wheezing  No heavy bleeding    He had a recent diagnosis of asthma prior to his surgery     With a maternal history of asthma.  He is on Qvar.  Upcoming intradermal testing pending        Physical Exam  /60   Pulse 76   Temp 98.2  F (36.8  C) (Tympanic)   Wt 90.7 kg (200 lb)   SpO2 98%   BMI 24.34 kg/m    General - The patient is well nourished and well developed, and appears to have good nutritional status.  Alert and oriented to person and place, interactive.  No stridor or mena wheezing  Head and Face - Normocephalic and atraumatic, with no gross asymmetry noted of the contour of the facial features.  The facial nerve is intact, with strong symmetric movements.  Neck-no palpable lymphadenopathy or thyroid mass.  Trachea is midline.  Eyes - Extraocular movements intact.   Nose - Nasal mucosa is pink and moist with no abnormal mucus.          To evaluate the nose and sinuses in the post operative state, I performed rigid nasal endoscopy.  Topical lidocaine and neosynepherine was applied.     I began with the LEFT side using a 0 degree rigid nasal endoscope, and then similarly examined the RIGHT  side    Findings:  Inferior turbinates:  Edematous post operatively  Rates it at 10 and 8 Charles to remove blood clot and was unable to visualize the septum the left septal incision is healing I further medialized the left inferior turbinate with a [place]  Removed nasal pore from the ethmoid cavity on the left  Antrostomy is patent  I similarly medialized the turbinate in the right and debrided nasal pore from the ethmoid cavity the antrostomy is patent on the right  I placed 1/2 piece of nasal pore soaked in Kenalog along the left nasal septum and inferior turbinate he tolerated this well          Impression/Plan  Hema Rico is a 20 year old male    ICD-10-CM    1. S/P FESS (functional endoscopic sinus surgery)  Z98.890 predniSONE (DELTASONE) 10 MG tablet   2. Cough variant asthma  J45.991            Continue Budesonide Rinses Twice Daily  Use Matty Med Nasal Saline Twice Daily  Use Prednisone as prescribed  Follow up as scheduled    Budesonide nasal saline irrigation per instructions:  -Obtain Amtty Med Sinus rinse over the counter.    -Use warm distilled water and 2 packets of the salt solution that comes with the bottle, dissolve in bottle up to the 240 mL tee.  -Add 1 vial of budesonide.  -Irrigate each side of your nose leaning over the sink, using 1/3 to 1/2 the volume of the bottle in each nostril every irrigation.  Irrigate 2 times daily.  -If additional rinses are needed/recommended, you may use the plan Matty Med Sinus irrigation without the use of added budesonide.       Risks of oral steroid use were discussed and include psychiatric/mood changes, insomnia, stomach ulcers and potential GI bleeding, blood sugar elevation/worsening diabetes, hip/bone necrosis called avascular necrosis, or bone demineralization.              Lianne Mejía D.O.  Otolaryngology/Head and Neck Surgery  Allergy

## 2020-09-01 ENCOUNTER — OFFICE VISIT (OUTPATIENT)
Dept: OTOLARYNGOLOGY | Facility: OTHER | Age: 20
End: 2020-09-01
Attending: PHYSICIAN ASSISTANT
Payer: COMMERCIAL

## 2020-09-01 VITALS
OXYGEN SATURATION: 98 % | TEMPERATURE: 98.2 F | HEART RATE: 76 BPM | WEIGHT: 200 LBS | BODY MASS INDEX: 24.34 KG/M2 | SYSTOLIC BLOOD PRESSURE: 124 MMHG | DIASTOLIC BLOOD PRESSURE: 60 MMHG

## 2020-09-01 DIAGNOSIS — J45.991 COUGH VARIANT ASTHMA: ICD-10-CM

## 2020-09-01 DIAGNOSIS — Z98.890 S/P FESS (FUNCTIONAL ENDOSCOPIC SINUS SURGERY): Primary | ICD-10-CM

## 2020-09-01 PROCEDURE — 99024 POSTOP FOLLOW-UP VISIT: CPT | Performed by: OTOLARYNGOLOGY

## 2020-09-01 PROCEDURE — 31237 NSL/SINS NDSC SURG BX POLYPC: CPT | Mod: 79 | Performed by: OTOLARYNGOLOGY

## 2020-09-01 RX ORDER — PREDNISONE 10 MG/1
TABLET ORAL
Qty: 12 TABLET | Refills: 1 | Status: SHIPPED | OUTPATIENT
Start: 2020-09-02 | End: 2020-10-01

## 2020-09-01 ASSESSMENT — PAIN SCALES - GENERAL: PAINLEVEL: MILD PAIN (2)

## 2020-09-01 NOTE — LETTER
9/1/2020         RE: Hema Rico  [address] [facility] [address]        Dear Colleague,    Thank you for referring your patient, Hema Rico, to the [facility]. Please see a copy of my visit note below.    Otolaryngology Progress Note          Hema Rico is a 20 year old male  5 days status post endoscopic sinus surgery    Procedures on 8/26/20  1.  Bilateral total ethmoidectomy  2.  Bilateral maxillary antrostomy with tissue removal and polypectomy  3.  Endoscopic Septoplasty  4.  Bilateral submucous reduction inferior turbinates  5.  Excision left josh bullosa    Mini implants used in each maxillary sinus and nasopore into ethmoid cavity    Findings: obstructive retention cyst left maxillary sinus , smaller retention cyst along the anterior inferior wall of the right maxillary sinus  Bilateral Stan cells  Polypoid mucosa of the anterior ethmoids bilaterally    Final path neg for eos, inflammatory polyp      Since surgery he has noted post nasal drainage  When supine this trigger wheezing  No heavy bleeding    He had a recent diagnosis of asthma prior to his surgery     With a maternal history of asthma.  He is on Qvar.  Upcoming intradermal testing pending        Physical Exam  /60   Pulse 76   Temp 98.2  F (36.8  C) (Tympanic)   Wt 90.7 kg (200 lb)   SpO2 98%   BMI 24.34 kg/m    General - The patient is well nourished and well developed, and appears to have good nutritional status.  Alert and oriented to person and place, interactive.  No stridor or mena wheezing  Head and Face - Normocephalic and atraumatic, with no gross asymmetry noted of the contour of the facial features.  The facial nerve is intact, with strong symmetric movements.  Neck-no palpable lymphadenopathy or thyroid mass.  Trachea is midline.  Eyes - Extraocular movements intact.   Nose - Nasal mucosa is pink and moist with no abnormal mucus.          To evaluate the nose and sinuses in  the post operative state, I performed rigid nasal endoscopy.  Topical lidocaine and neosynepherine was applied.     I began with the LEFT side using a 0 degree rigid nasal endoscope, and then similarly examined the RIGHT side    Findings:  Inferior turbinates:  Edematous post operatively  Rates it at 10 and 8 Charles to remove blood clot and was unable to visualize the septum the left septal incision is healing I further medialized the left inferior turbinate with a [place]  Removed nasal pore from the ethmoid cavity on the left  Antrostomy is patent  I similarly medialized the turbinate in the right and debrided nasal pore from the ethmoid cavity the antrostomy is patent on the right  I placed 1/2 piece of nasal pore soaked in Kenalog along the left nasal septum and inferior turbinate he tolerated this well          Impression/Plan  Hema Rico is a 20 year old male    ICD-10-CM    1. S/P FESS (functional endoscopic sinus surgery)  Z98.890 predniSONE (DELTASONE) 10 MG tablet           Continue Budesonide Rinses Twice Daily  Use Matty Med Nasal Saline Twice Daily  Use Prednisone as prescribed  Follow up as scheduled    Budesonide nasal saline irrigation per instructions:  -Obtain Matty Med Sinus rinse over the counter.    -Use warm distilled water and 2 packets of the salt solution that comes with the bottle, dissolve in bottle up to the 240 mL tee.  -Add 1 vial of budesonide.  -Irrigate each side of your nose leaning over the sink, using 1/3 to 1/2 the volume of the bottle in each nostril every irrigation.  Irrigate 2 times daily.  -If additional rinses are needed/recommended, you may use the plan Matty Med Sinus irrigation without the use of added budesonide.       Risks of oral steroid use were discussed and include psychiatric/mood changes, insomnia, stomach ulcers and potential GI bleeding, blood sugar elevation/worsening diabetes, hip/bone necrosis called avascular necrosis, or bone demineralization.               Lianne Mejía D.O.  Otolaryngology/Head and Neck Surgery  Allergy            Again, thank you for allowing me to participate in the care of your patient.        Sincerely,        Lianne Mejía MD

## 2020-09-01 NOTE — NURSING NOTE
"Chief Complaint   Patient presents with     Surgical Followup     S/P FESS, Septoplasty, Turbinate Reduction on 8/26/20       Initial /60   Pulse 76   Temp 98.2  F (36.8  C) (Tympanic)   Wt 90.7 kg (200 lb)   SpO2 98%   BMI 24.34 kg/m   Estimated body mass index is 24.34 kg/m  as calculated from the following:    Height as of 8/26/20: 1.93 m (6' 4\").    Weight as of this encounter: 90.7 kg (200 lb).  Medication Reconciliation: complete  Kaylen Roland LPN  "

## 2020-09-01 NOTE — PATIENT INSTRUCTIONS
Thank you for allowing Dr. Mejía and our ENT team to participate in your care.  If your medications are too expensive, please give the nurse a call.  We can possibly change this medication.  If you have a scheduling or an appointment question please contact our Health Unit Coordinator at their direct line [phone].   ALL nursing questions or concerns can be directed to your ENT nurse at: [phone] - [place]    Continue Budesonide Rinses Twice Daily  Use Matty Med Nasal Saline Twice Daily  Use Prednisone as prescribed  Follow up as scheduled    Budesonide nasal saline irrigation per instructions:  -Obtain Matty Med Sinus rinse over the counter.    -Use warm distilled water and 2 packets of the salt solution that comes with the bottle, dissolve in bottle up to the 240 mL tee.  -Add 1 vial of budesonide.  -Irrigate each side of your nose leaning over the sink, using 1/3 to 1/2 the volume of the bottle in each nostril every irrigation.  Irrigate 2 times daily.  -If additional rinses are needed/recommended, you may use the plan Matty Med Sinus irrigation without the use of added budesonide.

## 2020-09-09 ENCOUNTER — OFFICE VISIT (OUTPATIENT)
Dept: OTOLARYNGOLOGY | Facility: OTHER | Age: 20
End: 2020-09-09
Attending: NURSE PRACTITIONER
Payer: COMMERCIAL

## 2020-09-09 VITALS
HEART RATE: 82 BPM | WEIGHT: 200 LBS | OXYGEN SATURATION: 98 % | SYSTOLIC BLOOD PRESSURE: 130 MMHG | TEMPERATURE: 98.2 F | BODY MASS INDEX: 24.36 KG/M2 | HEIGHT: 76 IN | DIASTOLIC BLOOD PRESSURE: 74 MMHG

## 2020-09-09 DIAGNOSIS — Z98.890 S/P FESS (FUNCTIONAL ENDOSCOPIC SINUS SURGERY): Primary | ICD-10-CM

## 2020-09-09 PROCEDURE — 99024 POSTOP FOLLOW-UP VISIT: CPT | Performed by: NURSE PRACTITIONER

## 2020-09-09 PROCEDURE — 31231 NASAL ENDOSCOPY DX: CPT | Mod: 58 | Performed by: NURSE PRACTITIONER

## 2020-09-09 ASSESSMENT — MIFFLIN-ST. JEOR: SCORE: 2008.69

## 2020-09-09 ASSESSMENT — PAIN SCALES - GENERAL: PAINLEVEL: NO PAIN (0)

## 2020-09-09 NOTE — NURSING NOTE
"Chief Complaint   Patient presents with     Post-op Visit     s/p 8/26, Septo/FESS/TR       Initial /74 (Cuff Size: Adult Regular)   Pulse 82   Temp 98.2  F (36.8  C) (Tympanic)   Ht 1.93 m (6' 4\")   Wt 90.7 kg (200 lb)   SpO2 98%   BMI 24.34 kg/m   Estimated body mass index is 24.34 kg/m  as calculated from the following:    Height as of this encounter: 1.93 m (6' 4\").    Weight as of this encounter: 90.7 kg (200 lb).  Medication Reconciliation: complete  Tiffanie Albrecht LPN    " Chief Complaint  Flu shot today      Active Problems    1  Adult hypothyroidism (244 9) (E03 9)   2  Encounter for prostate cancer screening (V76 44) (Z12 5)   3  Encounter for screening colonoscopy (V76 51) (Z12 11)   4  Erectile dysfunction, unspecified erectile dysfunction type (607 84) (N52 9)   5  Foot pain (729 5) (M79 673)   6  Hyperlipidemia (272 4) (E78 5)   7  Hypertension (401 9) (I10)   8  Need for influenza vaccination (V04 81) (Z23)   9  Osteoarthritis (715 90) (M19 90)   10  Reflux esophagitis (530 11) (K21 0)    Current Meds   1  AmLODIPine Besylate 5 MG Oral Tablet; Take 1 tablet daily; Therapy: 78EIJ0351 [facility]  Requested for: 32Cwa9485 Ordered   2  Aspirin 325 MG Oral Tablet; Take 1 tablet daily Recorded   3  Cialis 5 MG Oral Tablet; Take 1 tablet daily; Therapy: 51Hvs8402 to (Last Allan Hirschfeld)  Requested for: 16Odj5784 Ordered   4  Eye Vitamins Oral Capsule; Therapy: (Recorded:20Krc5838) to Recorded   5  HydroCHLOROthiazide 25 MG Oral Tablet; Take 1 tablet daily; Therapy: 21Yck1047 to (Last Allan Hirschfeld)  Requested for: 81Olv8623 Ordered   6  Irbesartan 300 MG Oral Tablet; Take 1 tablet daily; Therapy: 77Pog2456 to (Last Allan Hirschfeld)  Requested for: 25Zoj8488 Ordered   7  Levothyroxine Sodium 25 MCG Oral Tablet; TAKE 1 TABLET DAILY WITH WATER AND   WAIT 30 MINUTES PRIOR TO EATING; Therapy: 25SRU3145 to (Last Allan Hirschfeld)  Requested for: 46Stm0670 Ordered   8  Meloxicam 15 MG Oral Tablet; Take 1 tablet daily; Therapy: 51DYW5077 to (Last Allan Hirschfeld)  Requested for: 53Wik9371 Ordered   9  Metoprolol Succinate ER 50 MG Oral Tablet Extended Release 24 Hour; Take 1 tablet   daily; Therapy: 20AOC0234 to (Last Allan Hirschfeld)  Requested for: 32Ulo6764 Ordered   10  Omeprazole 20 MG Oral Capsule Delayed Release; Take 2 capsules daily; Therapy: 96FEW9072 to (Last Allan Hirschfeld)  Requested for: 97Fnh5536 Ordered   11  Simvastatin 40 MG Oral Tablet;  Take 1 tablet daily as directed; Therapy: 58ZNA1235 to (Last Andrés Orf)  Requested for: 13Fbm7356 Ordered   12  Vitamin C 1000 MG Oral Tablet; Therapy: (Recorded:21Uhs2632) to Recorded    Allergies    1   No Known Drug Allergies    Plan  Need for influenza vaccination    · Fluzone Quadrivalent 0 5 ML Intramuscular Suspension Prefilled Syringe    Future Appointments    Date/Time Provider Specialty Site   02/27/2018 08:00 AM Zahraa Howell, [address]     Signatures   Electronically signed by : EDI Roy ; Sep 20 2017  8:53AM EST

## 2020-09-09 NOTE — PATIENT INSTRUCTIONS
Continue rinsing twice per day with budesonide and 2-3 times per day with plain roz med rinse  Follow up with Dr Mejía on 9/28 at 3:15 pm check in time      Thank you for allowing Shannan Roach NP and our ENT team to participate in your care.  If your medications are too expensive, please give the nurse a call.  We can possibly change this medication.  If you have a scheduling or an appointment question please contact our Health Unit Coordinator at their direct line [phone].   ALL nursing questions or concerns can be directed to your ENT Nurse--Ann-Marie: [phone]

## 2020-09-09 NOTE — LETTER
9/9/2020         RE: Hema Rico  [address] [place] [address]        Dear Colleague,    Thank you for referring your patient, Hema Rico, to the [facility]. Please see a copy of my visit note below.      Otolaryngology Note         Chief Complaint:     Patient presents with:  Post-op Visit: s/p 8/26, Septo/FESS/TR           History of Present Illness:     Hema Rico is a 20 year old male who presents today for his second post op FESS appointment.  The patient has done well this week.  There has been some serosanginous drainage from each nare.  There has been a complaints in regards to occasional headaches and crusting.  There has been no fever, chills, large amounts of bleeding, visual changes or neck pain.   Has been using budesonide rinses 2 times per day and rinsing x 1 with plain roz med sinus rinse.          Surgical Details:      Procedures on 8/26/20  1.  Bilateral total ethmoidectomy  2.  Bilateral maxillary antrostomy with tissue removal and polypectomy  3.  Endoscopic Septoplasty  4.  Bilateral submucous reduction inferior turbinates  5.  Excision left josh bullosa           Medications:     Current Outpatient Rx   Medication Sig Dispense Refill     albuterol (PROAIR HFA/PROVENTIL HFA/VENTOLIN HFA) 108 (90 Base) MCG/ACT inhaler Inhale 2 puffs into the lungs every 4 hours as needed for shortness of breath / dyspnea or wheezing 1 Inhaler 1     beclomethasone HFA (QVAR REDIHALER) 80 MCG/ACT inhaler Inhale 1 puff into the lungs 2 times daily 2 Inhaler 3     budesonide (PULMICORT) 0.5 MG/2ML neb solution Squirt entire vial into previously made roz med saline bottle, mix, and irrigate each nostril until entire bottle empty.  Do this twice daily. 3 Box 11     budesonide (PULMICORT) 0.5 MG/2ML neb solution Squirt entire vial into previously made roz med saline bottle, mix, and irrigate each nostril until entire bottle empty.  Do this twice daily. 3 Box 11      "fluticasone (FLONASE) 50 MCG/ACT nasal spray Spray 1 spray into both nostrils daily       Hypertonic Nasal Wash (SINUS RINSE) bottle Use as directed post operatively.  Dispense with the salt packages 1 each prn     loratadine (CLARITIN) 10 MG tablet Take 10 mg by mouth daily       montelukast (SINGULAIR) 10 MG tablet Take 1 tablet (10 mg) by mouth At Bedtime 30 tablet 5     multivitamin w/minerals (THERA-VIT-M) tablet Take 1 tablet by mouth daily       predniSONE (DELTASONE) 10 MG tablet Take 3 tabs after breakfast starting on 9/2, continue for 4 days 12 tablet 1     predniSONE (DELTASONE) 20 MG tablet 20 mg in the morning for days 1-4, then 10 mg (half tab) days 5 and 6 5 tablet 0     Vitamin D, Cholecalciferol, 25 MCG (1000 UT) CAPS Take 1 capsule by mouth              Allergies:     Allergies: Sulfa drugs          Past Medical History:     History reviewed. No pertinent past medical history.         Past Surgical History:     Past Surgical History:   Procedure Laterality Date     ENDOSCOPIC SINUS SURGERY Bilateral 8/26/2020    Procedure: BILATERAL ENDOSCOPIC SINUS SURGERY WITH PROPEL;  Surgeon: Lianne Mejía MD;  Location: [facility]      TOOTH EXTRACTION W/FORCEP       SEPTOPLASTY, TURBINOPLASTY, COMBINED Bilateral 8/26/2020    Procedure: SEPTOPLASTY, TURBINATE REDUCTION;  Surgeon: Lianne Mejía MD;  Location: [facility] OR            Social History:     Social History     Tobacco Use     Smoking status: Former Smoker     Types: Dip, chew, snus or snuff, Other     Smokeless tobacco: Former User     Types: Chew     Quit date: 8/28/2020   Substance Use Topics     Alcohol use: Yes     Frequency: 2-4 times a month     Comment: occ     Drug use: No            Review of Systems:     ROS: See HPI         Physical Exam:     /74 (Cuff Size: Adult Regular)   Pulse 82   Temp 98.2  F (36.8  C) (Tympanic)   Ht 1.93 m (6' 4\")   Wt 90.7 kg (200 lb)   SpO2 98%   BMI 24.34 kg/m         General - The patient is " well nourished and well developed, and appears to have good nutritional status.  Alert and oriented to person and place, answers questions and cooperates with examination appropriately.   Head and Face - Normocephalic and atraumatic, with no gross asymmetry noted.  The facial nerve is intact, with strong symmetric movements.  Voice and Breathing - The patient was breathing comfortably without the use of accessory muscles. There was no wheezing, stridor. The patients voice was clear and strong, and had appropriate pitch and quality.  Ears - External ear normal. Canals are patent. Right tympanic membrane is intact without effusion, retraction or mass. Left tympanic membrane is intact without effusion, retraction or mass.  Eyes - Extraocular movements intact, and the pupils were reactive to light. Sclera were not icteric or injected, conjunctiva were pink and moist.   Mouth - Examination of the oral cavity showed pink, healthy oral mucosa. Dentition in good condition. No lesions or ulcerations noted. The tongue was mobile and midline.   Throat - The walls of the oropharynx were smooth, pink, moist, symmetric, and had no lesions or ulcerations.  The tonsillar pillars and soft palate were symmetric. The uvula was midline on elevation.    Neck - Normal midline excursion of the laryngotracheal complex during swallowing.  Full range of motion on passive movement.  Palpation of the occipital, submental, submandibular, internal jugular chain, and supraclavicular nodes did not demonstrate any abnormal lymph nodes or masses.  Palpation of the thyroid was soft and smooth, with no nodules or goiter appreciated.  The trachea was mobile and midline.  Nose - External contour is symmetric, no gross deflection or scars.  Nasal mucosa is pink and moist with no abnormal mucus.        To evaluate the nose and sinuses in the post operative state, I performed rigid nasal endoscopy.  I anesthetized both nares with lidocaine and  neosynephrine soaked pledgets.     I began with the RIGHT side using a 0 degree rigid nasal endoscope, and then similarly examined the LEFT side     Findings: There is no active bleeding and no sign of septal perforation or hematoma.  Septum is grossly midline with mild edema, sutures intact.    Inferior turbinates:  lateralized with crusting.  Nasapore noted between left IT and septum, this was removed with suction.  No synechiae noted.  There is crusting in the middle meatus and maxillary antrostomy bilaterally, this was suctioned to tolerance with some crusting remaining as to prevent bleeding.  No purulence, no polyposis  Mucosa is healthy and healing throughout,  no polyps nor polypoid degeneration  The patient tolerated the procedure well            Assessment and Plan:       ICD-10-CM    1. S/P FESS (functional endoscopic sinus surgery)  Z98.890      Continue post operative instructions  Keep scheduled follow up with Dr Mejía for second post operative exam  Continue roz med saline rinses 5 times per day    Shanann DARBY  [facility] ENT      Scope # 1207AM    Again, thank you for allowing me to participate in the care of your patient.        Sincerely,        Shannan Roach NP

## 2020-09-09 NOTE — PROGRESS NOTES
Otolaryngology Note         Chief Complaint:     Patient presents with:  Post-op Visit: s/p 8/26, Septo/FESS/TR           History of Present Illness:     Hema Rico is a 20 year old male who presents today for his second post op FESS appointment.  The patient has done well this week.  There has been some serosanginous drainage from each nare.  There has been a complaints in regards to occasional headaches and crusting.  There has been no fever, chills, large amounts of bleeding, visual changes or neck pain.   Has been using budesonide rinses 2 times per day and rinsing x 1 with plain roz med sinus rinse.          Surgical Details:      Procedures on 8/26/20  1.  Bilateral total ethmoidectomy  2.  Bilateral maxillary antrostomy with tissue removal and polypectomy  3.  Endoscopic Septoplasty  4.  Bilateral submucous reduction inferior turbinates  5.  Excision left josh bullosa           Medications:     Current Outpatient Rx   Medication Sig Dispense Refill     albuterol (PROAIR HFA/PROVENTIL HFA/VENTOLIN HFA) 108 (90 Base) MCG/ACT inhaler Inhale 2 puffs into the lungs every 4 hours as needed for shortness of breath / dyspnea or wheezing 1 Inhaler 1     beclomethasone HFA (QVAR REDIHALER) 80 MCG/ACT inhaler Inhale 1 puff into the lungs 2 times daily 2 Inhaler 3     budesonide (PULMICORT) 0.5 MG/2ML neb solution Squirt entire vial into previously made roz med saline bottle, mix, and irrigate each nostril until entire bottle empty.  Do this twice daily. 3 Box 11     budesonide (PULMICORT) 0.5 MG/2ML neb solution Squirt entire vial into previously made roz med saline bottle, mix, and irrigate each nostril until entire bottle empty.  Do this twice daily. 3 Box 11     fluticasone (FLONASE) 50 MCG/ACT nasal spray Spray 1 spray into both nostrils daily       Hypertonic Nasal Wash (SINUS RINSE) bottle Use as directed post operatively.  Dispense with the salt packages 1 each prn     loratadine (CLARITIN) 10 MG  "tablet Take 10 mg by mouth daily       montelukast (SINGULAIR) 10 MG tablet Take 1 tablet (10 mg) by mouth At Bedtime 30 tablet 5     multivitamin w/minerals (THERA-VIT-M) tablet Take 1 tablet by mouth daily       predniSONE (DELTASONE) 10 MG tablet Take 3 tabs after breakfast starting on 9/2, continue for 4 days 12 tablet 1     predniSONE (DELTASONE) 20 MG tablet 20 mg in the morning for days 1-4, then 10 mg (half tab) days 5 and 6 5 tablet 0     Vitamin D, Cholecalciferol, 25 MCG (1000 UT) CAPS Take 1 capsule by mouth              Allergies:     Allergies: Sulfa drugs          Past Medical History:     History reviewed. No pertinent past medical history.         Past Surgical History:     Past Surgical History:   Procedure Laterality Date     ENDOSCOPIC SINUS SURGERY Bilateral 8/26/2020    Procedure: BILATERAL ENDOSCOPIC SINUS SURGERY WITH PROPEL;  Surgeon: Lianne Mejía MD;  Location: [facility]      TOOTH EXTRACTION W/FORCEP       SEPTOPLASTY, TURBINOPLASTY, COMBINED Bilateral 8/26/2020    Procedure: SEPTOPLASTY, TURBINATE REDUCTION;  Surgeon: Lianne Mejía MD;  Location: [facility] OR            Social History:     Social History     Tobacco Use     Smoking status: Former Smoker     Types: Dip, chew, snus or snuff, Other     Smokeless tobacco: Former User     Types: Chew     Quit date: 8/28/2020   Substance Use Topics     Alcohol use: Yes     Frequency: 2-4 times a month     Comment: occ     Drug use: No            Review of Systems:     ROS: See HPI         Physical Exam:     /74 (Cuff Size: Adult Regular)   Pulse 82   Temp 98.2  F (36.8  C) (Tympanic)   Ht 1.93 m (6' 4\")   Wt 90.7 kg (200 lb)   SpO2 98%   BMI 24.34 kg/m         General - The patient is well nourished and well developed, and appears to have good nutritional status.  Alert and oriented to person and place, answers questions and cooperates with examination appropriately.   Head and Face - Normocephalic and atraumatic, with no " gross asymmetry noted.  The facial nerve is intact, with strong symmetric movements.  Voice and Breathing - The patient was breathing comfortably without the use of accessory muscles. There was no wheezing, stridor. The patients voice was clear and strong, and had appropriate pitch and quality.  Ears - External ear normal. Canals are patent. Right tympanic membrane is intact without effusion, retraction or mass. Left tympanic membrane is intact without effusion, retraction or mass.  Eyes - Extraocular movements intact, and the pupils were reactive to light. Sclera were not icteric or injected, conjunctiva were pink and moist.   Mouth - Examination of the oral cavity showed pink, healthy oral mucosa. Dentition in good condition. No lesions or ulcerations noted. The tongue was mobile and midline.   Throat - The walls of the oropharynx were smooth, pink, moist, symmetric, and had no lesions or ulcerations.  The tonsillar pillars and soft palate were symmetric. The uvula was midline on elevation.    Neck - Normal midline excursion of the laryngotracheal complex during swallowing.  Full range of motion on passive movement.  Palpation of the occipital, submental, submandibular, internal jugular chain, and supraclavicular nodes did not demonstrate any abnormal lymph nodes or masses.  Palpation of the thyroid was soft and smooth, with no nodules or goiter appreciated.  The trachea was mobile and midline.  Nose - External contour is symmetric, no gross deflection or scars.  Nasal mucosa is pink and moist with no abnormal mucus.        To evaluate the nose and sinuses in the post operative state, I performed rigid nasal endoscopy.  I anesthetized both nares with lidocaine and neosynephrine soaked pledgets.     I began with the RIGHT side using a 0 degree rigid nasal endoscope, and then similarly examined the LEFT side     Findings: There is no active bleeding and no sign of septal perforation or hematoma.  Septum is grossly  midline with mild edema, sutures intact.    Inferior turbinates:  lateralized with crusting.  Nasapore noted between left IT and septum, this was removed with suction.  No synechiae noted.  There is crusting in the middle meatus and maxillary antrostomy bilaterally, this was suctioned to tolerance with some crusting remaining as to prevent bleeding.  No purulence, no polyposis  Mucosa is healthy and healing throughout,  no polyps nor polypoid degeneration  The patient tolerated the procedure well            Assessment and Plan:       ICD-10-CM    1. S/P FESS (functional endoscopic sinus surgery)  Z98.890      Continue post operative instructions  Keep scheduled follow up with Dr Mejía for second post operative exam  Continue roz med saline rinses 5 times per day    Shannan Roach NP-C  [facility] ENT

## 2020-09-30 NOTE — PROGRESS NOTES
Otolaryngology Progress Note          Hema Rico is a 20 year old male      Presents for his third postoperative visit after endoscopic sinus surgery on 8/26/2020    Procedures on 8/26/20  1.  Bilateral total ethmoidectomy  2.  Bilateral maxillary antrostomy with tissue removal and polypectomy  3.  Endoscopic Septoplasty  4.  Bilateral submucous reduction inferior turbinates  5.  Excision left josh bullosa       Final path neg for eos, inflammatory polyp    History of cough variant asthma  Cough has improved  He noticed this Singulair is leading to GI upset      He has been using budesonide irrigations  And was started on a prednisone taper after his last visit with me.  In the interim he saw Jose on 9 9 and was doing well  Occasional dysosmia, no bleeding, starting to note improved nasal breathing      Physical Exam  BP 90/60   Pulse 82   Temp 98.3  F (36.8  C) (Tympanic)   SpO2 97%   General - The patient is well nourished and well developed, and appears to have good nutritional status.  Alert and oriented to person and place, interactive.  Head and Face - Normocephalic and atraumatic, with no gross asymmetry noted of the contour of the facial features.  The facial nerve is intact, with strong symmetric movements.  Neck-no palpable lymphadenopathy or thyroid mass.  Trachea is midline.  Eyes - Extraocular movements intact.   Nose - Nasal mucosa is pink and moist with no abnormal mucus.  The septum was grossly midline and non-obstructive, turbinates of normal size and position.  No polyps, masses, or purulence noted on examination.  Mouth - Examination of the oral cavity shows pink, healthy, moist mucosa.  No lesions or ulceration noted.  The dentition are in good repair.  The tongue is mobile and midline.  Throat - The walls of the oropharynx were smooth, pink, moist, symmetric, and had no lesions or ulcerations.  The tonsillar pillars and soft palate were symmetric.  The uvula was midline on elevation.         To evaluate the nose and sinuses in the post operative state, I performed rigid nasal endoscopy. The nose was anesthetized with home afrin or topical lidocaine and neosynephrine in the office.    I began with the LEFT side using a 0 degree rigid nasal endoscope, and then similarly examined the RIGHT side    Findings:  Inferior turbinates:  Lateralized  And 8 Charles and a curved suction were used to debride  normal-appearing dried secretions and residual nasal pore from the inferior and middle meatus.  The maxillary to ostomies are patent bilaterally mucosa is flat and healthy  no polyposis, no synechiae  Septum midline and intact  Ethmoid cavity clear  Mucosa is  healthy throughout without polyps nor polypoid degeneration        Impression/Plan  Hema Rico is a 20 year old male    ICD-10-CM    1. S/P FESS (functional endoscopic sinus surgery)  Z98.890 budesonide (PULMICORT) 0.5 MG/2ML neb solution         He should continue his budesonide irrigations twice a day for 6 months he can then use this as needed for sinusitis or congestion.  Additional data does prove that budesonide irrigations are helpful for patients with allergies so he could also restart budesonide irrigations in the distant future for allergy flares  Continue Claritin daily  Restart Flonase in 2 weeks  Stop the Singulair which is leading to GI upset  Follow-up as needed she was reassured her sinuses look great    Lianne Mejía D.O.  Otolaryngology/Head and Neck Surgery  Allergy

## 2020-10-01 ENCOUNTER — OFFICE VISIT (OUTPATIENT)
Dept: OTOLARYNGOLOGY | Facility: OTHER | Age: 20
End: 2020-10-01
Attending: OTOLARYNGOLOGY
Payer: COMMERCIAL

## 2020-10-01 VITALS
DIASTOLIC BLOOD PRESSURE: 60 MMHG | HEART RATE: 82 BPM | SYSTOLIC BLOOD PRESSURE: 90 MMHG | OXYGEN SATURATION: 97 % | TEMPERATURE: 98.3 F

## 2020-10-01 DIAGNOSIS — Z98.890 S/P FESS (FUNCTIONAL ENDOSCOPIC SINUS SURGERY): Primary | ICD-10-CM

## 2020-10-01 PROBLEM — J34.3 NASAL TURBINATE HYPERTROPHY: Status: RESOLVED | Noted: 2020-08-10 | Resolved: 2020-10-01

## 2020-10-01 PROBLEM — J34.2 DEVIATED NASAL SEPTUM: Status: RESOLVED | Noted: 2020-08-10 | Resolved: 2020-10-01

## 2020-10-01 PROBLEM — J32.9 CHRONIC RECURRENT SINUSITIS: Status: RESOLVED | Noted: 2020-08-10 | Resolved: 2020-10-01

## 2020-10-01 PROBLEM — R09.81 NASAL CONGESTION: Chronic | Status: RESOLVED | Noted: 2019-06-06 | Resolved: 2020-10-01

## 2020-10-01 PROBLEM — J34.1 MUCOUS RETENTION CYST OF MAXILLARY SINUS: Status: RESOLVED | Noted: 2020-06-09 | Resolved: 2020-10-01

## 2020-10-01 PROBLEM — J32.9 CHRONIC CONGESTION OF PARANASAL SINUS: Status: RESOLVED | Noted: 2020-06-09 | Resolved: 2020-10-01

## 2020-10-01 PROCEDURE — G0463 HOSPITAL OUTPT CLINIC VISIT: HCPCS

## 2020-10-01 PROCEDURE — 99024 POSTOP FOLLOW-UP VISIT: CPT | Performed by: OTOLARYNGOLOGY

## 2020-10-01 PROCEDURE — 31237 NSL/SINS NDSC SURG BX POLYPC: CPT | Mod: 79 | Performed by: OTOLARYNGOLOGY

## 2020-10-01 RX ORDER — BUDESONIDE 0.5 MG/2ML
INHALANT ORAL
Qty: 3 BOX | Refills: 11 | Status: SHIPPED | OUTPATIENT
Start: 2020-10-01 | End: 2021-08-26

## 2020-10-01 ASSESSMENT — PAIN SCALES - GENERAL: PAINLEVEL: NO PAIN (0)

## 2020-10-01 NOTE — PATIENT INSTRUCTIONS
Thank you for allowing Dr. Mejía and our ENT team to participate in your care.  If your medications are too expensive, please give the nurse a call.  We can possibly change this medication.  If you have a scheduling or an appointment question please contact our Health Unit Coordinator at their direct line [phone].   ALL nursing questions or concerns can be directed to your ENT nurse at: [phone] - Tiffanie    Stop Singulair  Restart Flonase in 2 weeks  No nose blowing for 3 days  Continue Budesonide Rinses  Sinuses Look Good

## 2020-10-01 NOTE — NURSING NOTE
"Chief Complaint   Patient presents with     Surgical Followup     S/P FESS, Septoplasty, Turbinate Reduction on 8/26/20       Initial BP 90/60   Pulse 82   Temp 98.3  F (36.8  C) (Tympanic)   SpO2 97%  Estimated body mass index is 24.34 kg/m  as calculated from the following:    Height as of 9/9/20: 1.93 m (6' 4\").    Weight as of 9/9/20: 90.7 kg (200 lb).  Medication Reconciliation: complete  Kaylen Roland LPN  "

## 2020-10-01 NOTE — LETTER
10/1/2020         RE: Hema Rico  [address] [place] [address]        Dear Colleague,    Thank you for referring your patient, Hema Rico, to the [facility]. Please see a copy of my visit note below.    Otolaryngology Progress Note          Hema Rico is a 20 year old male      Presents for his third postoperative visit after endoscopic sinus surgery on 8/26/2020    Procedures on 8/26/20  1.  Bilateral total ethmoidectomy  2.  Bilateral maxillary antrostomy with tissue removal and polypectomy  3.  Endoscopic Septoplasty  4.  Bilateral submucous reduction inferior turbinates  5.  Excision left josh bullosa       Final path neg for eos, inflammatory polyp    History of cough variant asthma  Cough has improved  He noticed this Singulair is leading to GI upset      He has been using budesonide irrigations  And was started on a prednisone taper after his last visit with me.  In the interim he saw Jose on 9 9 and was doing well  Occasional dysosmia, no bleeding, starting to note improved nasal breathing      Physical Exam  BP 90/60   Pulse 82   Temp 98.3  F (36.8  C) (Tympanic)   SpO2 97%   General - The patient is well nourished and well developed, and appears to have good nutritional status.  Alert and oriented to person and place, interactive.  Head and Face - Normocephalic and atraumatic, with no gross asymmetry noted of the contour of the facial features.  The facial nerve is intact, with strong symmetric movements.  Neck-no palpable lymphadenopathy or thyroid mass.  Trachea is midline.  Eyes - Extraocular movements intact.   Nose - Nasal mucosa is pink and moist with no abnormal mucus.  The septum was grossly midline and non-obstructive, turbinates of normal size and position.  No polyps, masses, or purulence noted on examination.  Mouth - Examination of the oral cavity shows pink, healthy, moist mucosa.  No lesions or ulceration noted.  The dentition are in good  repair.  The tongue is mobile and midline.  Throat - The walls of the oropharynx were smooth, pink, moist, symmetric, and had no lesions or ulcerations.  The tonsillar pillars and soft palate were symmetric.  The uvula was midline on elevation.        To evaluate the nose and sinuses in the post operative state, I performed rigid nasal endoscopy. The nose was anesthetized with home afrin or topical lidocaine and neosynephrine in the office.    I began with the LEFT side using a 0 degree rigid nasal endoscope, and then similarly examined the RIGHT side    Findings:  Inferior turbinates:  Lateralized  And 8 Charles and a curved suction were used to debride  normal-appearing dried secretions and residual nasal pore from the inferior and middle meatus.  The maxillary to ostomies are patent bilaterally mucosa is flat and healthy  no polyposis, no synechiae  Septum midline and intact  Ethmoid cavity clear  Mucosa is  healthy throughout without polyps nor polypoid degeneration        Impression/Plan  Hema Rico is a 20 year old male    ICD-10-CM    1. S/P FESS (functional endoscopic sinus surgery)  Z98.890 budesonide (PULMICORT) 0.5 MG/2ML neb solution         He should continue his budesonide irrigations twice a day for 6 months he can then use this as needed for sinusitis or congestion.  Additional data does prove that budesonide irrigations are helpful for patients with allergies so he could also restart budesonide irrigations in the distant future for allergy flares  Continue Claritin daily  Restart Flonase in 2 weeks  Stop the Singulair which is leading to GI upset  Follow-up as needed she was reassured her sinuses look great    MEL Boss.O.  Otolaryngology/Head and Neck Surgery  Allergy                       Again, thank you for allowing me to participate in the care of your patient.        Sincerely,        Lianne Mejía MD

## 2020-11-11 ENCOUNTER — TELEPHONE (OUTPATIENT)
Dept: OTOLARYNGOLOGY | Facility: OTHER | Age: 20
End: 2020-11-11

## 2020-11-11 NOTE — TELEPHONE ENCOUNTER
Spoke to Hema.  He will call us back in a month or so.  He cannot take any time off from work for awhile. He will call us to reschedule allergy testing.

## 2020-12-20 ENCOUNTER — HEALTH MAINTENANCE LETTER (OUTPATIENT)
Age: 20
End: 2020-12-20

## 2021-01-22 ENCOUNTER — TELEPHONE (OUTPATIENT)
Dept: OTOLARYNGOLOGY | Facility: OTHER | Age: 21
End: 2021-01-22

## 2021-01-22 NOTE — TELEPHONE ENCOUNTER
Prior Authorization Retail Medication Request  [facility] KEY# [id number]    Medication/Dose: QVAR REDIHALER 80MCG/ACT AEROSOL  ICD code (if different than what is on RX):    Previously Tried and Failed:    Rationale:      Insurance Name:    Insurance ID:        Pharmacy Information (if different than what is on RX)  Name:    Phone:

## 2021-01-25 NOTE — TELEPHONE ENCOUNTER
Central Prior Authorization Team   Phone: [phone]      PA Initiation    Medication: QVAR REDIHALER 80MCG/ACT AEROSOL  Insurance Company: [employer] ([facility]) - Phone [phone] Fax [phone]  Pharmacy Filling the Rx: [facility] [address] [place], [place] - [address]   Filling Pharmacy Phone: [phone]  Filling Pharmacy Fax:    Start Date: 1/25/2021

## 2021-01-26 NOTE — TELEPHONE ENCOUNTER
PRIOR AUTHORIZATION DENIED    Medication: QVAR REDIHALER 80MCG/ACT AEROSOL    Denial Date: 1/26/2021    Denial Rational:  Per insurance, medication is excluded from patient's benefit plan and will not be covered. Review and appeal are not available because of this exclusion.        Appeal Information:  N/A

## 2021-03-16 ENCOUNTER — TELEPHONE (OUTPATIENT)
Dept: FAMILY MEDICINE | Facility: OTHER | Age: 21
End: 2021-03-16

## 2021-03-17 ENCOUNTER — TELEPHONE (OUTPATIENT)
Dept: FAMILY MEDICINE | Facility: OTHER | Age: 21
End: 2021-03-17

## 2021-03-17 DIAGNOSIS — J45.991 COUGH VARIANT ASTHMA: Primary | ICD-10-CM

## 2021-03-18 NOTE — TELEPHONE ENCOUNTER
Not sure what inhaler would be equivalent to qvar that would be covered. I do not have a rx pended.

## 2021-03-19 RX ORDER — FLUTICASONE PROPIONATE AND SALMETEROL 113; 14 UG/1; UG/1
1 POWDER, METERED RESPIRATORY (INHALATION) 2 TIMES DAILY
Qty: 60 EACH | Refills: 11 | Status: SHIPPED | OUTPATIENT
Start: 2021-03-19 | End: 2021-08-26

## 2021-03-20 ENCOUNTER — MYC MEDICAL ADVICE (OUTPATIENT)
Dept: FAMILY MEDICINE | Facility: OTHER | Age: 21
End: 2021-03-20

## 2021-03-20 DIAGNOSIS — J45.40 MODERATE PERSISTENT ASTHMA, UNSPECIFIED WHETHER COMPLICATED: Primary | ICD-10-CM

## 2021-03-22 RX ORDER — FLUTICASONE PROPIONATE 110 UG/1
1 AEROSOL, METERED RESPIRATORY (INHALATION) 2 TIMES DAILY
Qty: 12 G | Refills: 1 | Status: SHIPPED | OUTPATIENT
Start: 2021-03-22 | End: 2021-07-27

## 2021-03-23 ENCOUNTER — TELEPHONE (OUTPATIENT)
Dept: FAMILY MEDICINE | Facility: OTHER | Age: 21
End: 2021-03-23

## 2021-03-23 NOTE — TELEPHONE ENCOUNTER
Prior Authorization Retail Medication Request  [facility] KEY# [id number]    Medication/Dose: FLUTICASONE-SALMETEROL 113-14MCG/ACT AEROSOL POWDER  ICD code (if different than what is on RX):    Previously Tried and Failed:    Rationale:      Insurance Name:    Insurance ID:        Pharmacy Information (if different than what is on RX)  Name:    Phone:

## 2021-03-24 NOTE — TELEPHONE ENCOUNTER
Prior Authorization Not Needed per Insurance    Medication: fluticasone-salmeterol (AIRDUO RESPICLICK) 113-14 MCG/ACT inhaler--NO PA NEEDED  Insurance Company: [employer] ([facility]) - Phone [phone] Fax [phone]  Expected CoPay:      Pharmacy Filling the Rx: [id number] - [place], [place] - [address]   Pharmacy Notified: Yes  Patient Notified: Yes **Instructed pharmacy to notify patient when script is ready to /ship.**    Per insurance rep, pharmacy needs to run it as 1 per 30 and the second rejection can be overrided by help desk.  Per pharmacy they cannot run it as 1.  Let them know to contact help desk for further assistance.

## 2021-07-14 DIAGNOSIS — R05.8 ALLERGIC COUGH: ICD-10-CM

## 2021-07-14 RX ORDER — ALBUTEROL SULFATE 90 UG/1
AEROSOL, METERED RESPIRATORY (INHALATION)
Qty: 18 G | Refills: 0 | Status: SHIPPED | OUTPATIENT
Start: 2021-07-14 | End: 2022-02-09

## 2021-07-14 NOTE — TELEPHONE ENCOUNTER
albuterol (PROAIR HFA/PROVENTIL HFA/VENTOLIN HFA) 108 (90 Base) MCG/ACT inhaler  Last Written Prescription Date:  5/13/20  Last Fill Quantity: 1,  # refills: 1   Last office visit: 8/20/20 [place]   Future Office Visit:      Routing refill request to provider for review/approval because:  Patient needs to be seen because:  Has not been seen in the past 6 months. Pt aware that he is due for office visit

## 2021-07-16 ENCOUNTER — TELEPHONE (OUTPATIENT)
Dept: FAMILY MEDICINE | Facility: OTHER | Age: 21
End: 2021-07-16

## 2021-07-16 NOTE — TELEPHONE ENCOUNTER
Received a DENIAL from [employer] for Ventolin  (90 Base)mcg/act aerosol.     Forms scanned to [facility].

## 2021-07-16 NOTE — TELEPHONE ENCOUNTER
Received a PA from [employer] for Ventolin  (90 Base)MCG/ACT aerosol. Submitted on CMM. Waiting for a response.

## 2021-08-25 NOTE — PROGRESS NOTES
"    {PROVIDER CHARTING PREFERENCE:739209}    Subjective   Hema is a 21 year old who presents for the following health issues {ACCOMPANIED BY STATEMENT (Optional):245609}    DUE FOR  ADVANCE CARE PLANNING  COVID VACCINE  HEP C SCREENING  HPV #2  PHQ 2  PHYSICAL  ACT  TDAP  JENEA/PHQ 9        HPI     Medication Followup of ***    Taking Medication as prescribed: {.:841169::\"yes\"}    Side Effects:  {NONEORCHOOSE:532765::\"None\"}    Medication Helping Symptoms:  {.:992219::\"yes\"}     {additonal problems for provider to add (Optional):532779}    Review of Systems   {ROS COMP (Optional):559908}      Objective    There were no vitals taken for this visit.  There is no height or weight on file to calculate BMI.  Physical Exam   {Exam List (Optional):054914}    {Diagnostic Test Results (Optional):341810}    {AMBULATORY ATTESTATION (Optional):454609}        "

## 2021-08-26 ENCOUNTER — OFFICE VISIT (OUTPATIENT)
Dept: FAMILY MEDICINE | Facility: OTHER | Age: 21
End: 2021-08-26
Attending: PHYSICIAN ASSISTANT
Payer: COMMERCIAL

## 2021-08-26 VITALS
TEMPERATURE: 97 F | WEIGHT: 203.6 LBS | HEART RATE: 85 BPM | HEIGHT: 76 IN | BODY MASS INDEX: 24.79 KG/M2 | SYSTOLIC BLOOD PRESSURE: 100 MMHG | DIASTOLIC BLOOD PRESSURE: 62 MMHG | OXYGEN SATURATION: 99 %

## 2021-08-26 DIAGNOSIS — J45.40 MODERATE PERSISTENT ASTHMA, UNSPECIFIED WHETHER COMPLICATED: Primary | ICD-10-CM

## 2021-08-26 DIAGNOSIS — Z28.39 SCHEDULED IMMUNIZATIONS NOT UP TO DATE: ICD-10-CM

## 2021-08-26 PROCEDURE — 90471 IMMUNIZATION ADMIN: CPT | Performed by: PHYSICIAN ASSISTANT

## 2021-08-26 PROCEDURE — 90670 PCV13 VACCINE IM: CPT | Performed by: PHYSICIAN ASSISTANT

## 2021-08-26 PROCEDURE — 90472 IMMUNIZATION ADMIN EACH ADD: CPT | Performed by: PHYSICIAN ASSISTANT

## 2021-08-26 PROCEDURE — 99214 OFFICE O/P EST MOD 30 MIN: CPT | Mod: 25 | Performed by: PHYSICIAN ASSISTANT

## 2021-08-26 PROCEDURE — 90715 TDAP VACCINE 7 YRS/> IM: CPT | Performed by: PHYSICIAN ASSISTANT

## 2021-08-26 RX ORDER — FLUTICASONE PROPIONATE AND SALMETEROL 232; 14 UG/1; UG/1
1 POWDER, METERED RESPIRATORY (INHALATION) 2 TIMES DAILY
Qty: 1 EACH | Refills: 11 | Status: SHIPPED | OUTPATIENT
Start: 2021-08-26 | End: 2022-02-18

## 2021-08-26 ASSESSMENT — ANXIETY QUESTIONNAIRES
4. TROUBLE RELAXING: NOT AT ALL
5. BEING SO RESTLESS THAT IT IS HARD TO SIT STILL: NOT AT ALL
3. WORRYING TOO MUCH ABOUT DIFFERENT THINGS: NOT AT ALL
6. BECOMING EASILY ANNOYED OR IRRITABLE: NOT AT ALL
1. FEELING NERVOUS, ANXIOUS, OR ON EDGE: NOT AT ALL
2. NOT BEING ABLE TO STOP OR CONTROL WORRYING: NOT AT ALL
GAD7 TOTAL SCORE: 0
7. FEELING AFRAID AS IF SOMETHING AWFUL MIGHT HAPPEN: NOT AT ALL

## 2021-08-26 ASSESSMENT — MIFFLIN-ST. JEOR: SCORE: 2020.02

## 2021-08-26 ASSESSMENT — PAIN SCALES - GENERAL: PAINLEVEL: NO PAIN (0)

## 2021-08-26 ASSESSMENT — PATIENT HEALTH QUESTIONNAIRE - PHQ9: SUM OF ALL RESPONSES TO PHQ QUESTIONS 1-9: 0

## 2021-08-26 ASSESSMENT — ASTHMA QUESTIONNAIRES
QUESTION_3 LAST FOUR WEEKS HOW OFTEN DID YOUR ASTHMA SYMPTOMS (WHEEZING, COUGHING, SHORTNESS OF BREATH, CHEST TIGHTNESS OR PAIN) WAKE YOU UP AT NIGHT OR EARLIER THAN USUAL IN THE MORNING: FOUR OR MORE NIGHTS A WEEK
ACT_TOTALSCORE: 11
QUESTION_5 LAST FOUR WEEKS HOW WOULD YOU RATE YOUR ASTHMA CONTROL: POORLY CONTROLLED
QUESTION_2 LAST FOUR WEEKS HOW OFTEN HAVE YOU HAD SHORTNESS OF BREATH: ONCE A DAY
QUESTION_1 LAST FOUR WEEKS HOW MUCH OF THE TIME DID YOUR ASTHMA KEEP YOU FROM GETTING AS MUCH DONE AT WORK, SCHOOL OR AT HOME: SOME OF THE TIME
QUESTION_4 LAST FOUR WEEKS HOW OFTEN HAVE YOU USED YOUR RESCUE INHALER OR NEBULIZER MEDICATION (SUCH AS ALBUTEROL): TWO OR THREE TIMES PER WEEK

## 2021-08-26 NOTE — NURSING NOTE
"Chief Complaint   Patient presents with     medication review     Medication Follow-up       Initial Blood Pressure 100/62 (BP Location: Left arm, Patient Position: Sitting, Cuff Size: Adult Regular)   Pulse 85   Temperature 97  F (36.1  C) (Tympanic)   Height 1.93 m (6' 4\")   Weight 92.4 kg (203 lb 9.6 oz)   Oxygen Saturation 99%   Body Mass Index 24.78 kg/m   Estimated body mass index is 24.78 kg/m  as calculated from the following:    Height as of this encounter: 1.93 m (6' 4\").    Weight as of this encounter: 92.4 kg (203 lb 9.6 oz).  Medication Reconciliation: complete  Zenaida Munoz LPN  "

## 2021-08-26 NOTE — LETTER
My Asthma Action Plan    Name: Hema Rico   YOB: 2000  Date: 8/26/2021   My doctor: FIDENCIO Platt   My clinic: [facility] [place]        My Rescue Medicine:   Albuterol inhaler (Proair/Ventolin/Proventil HFA)  2-4 puffs EVERY 4 HOURS as needed. Use a spacer if recommended by your provider.   My Asthma Severity:   Intermittent / Exercise Induced  Know your asthma triggers: allergies             GREEN ZONE   Good Control    I feel good    No cough or wheeze    Can work, sleep and play without asthma symptoms       Take your asthma control medicine every day.     1. If exercise triggers your asthma, take your rescue medication    15 minutes before exercise or sports, and    During exercise if you have asthma symptoms  2. Spacer to use with inhaler: If you have a spacer, make sure to use it with your inhaler             YELLOW ZONE Getting Worse  I have ANY of these:    I do not feel good    Cough or wheeze    Chest feels tight    Wake up at night   1. Keep taking your Green Zone medications  2. Start taking your rescue medicine:    every 20 minutes for up to 1 hour. Then every 4 hours for 24-48 hours.  3. If you stay in the Yellow Zone for more than 12-24 hours, contact your doctor.  4. If you do not return to the Green Zone in 12-24 hours or you get worse, start taking your oral steroid medicine if prescribed by your provider.           RED ZONE Medical Alert - Get Help  I have ANY of these:    I feel awful    Medicine is not helping    Breathing getting harder    Trouble walking or talking    Nose opens wide to breathe       1. Take your rescue medicine NOW  2. If your provider has prescribed an oral steroid medicine, start taking it NOW  3. Call your doctor NOW  4. If you are still in the Red Zone after 20 minutes and you have not reached your doctor:    Take your rescue medicine again and    Call 911 or go to the emergency room right away    See your regular doctor within  2 weeks of an Emergency Room or Urgent Care visit for follow-up treatment.          Annual Reminders:  Meet with Asthma Educator,  Flu Shot in the Fall, consider Pneumonia Vaccination for patients with asthma (aged 19 and older).    Pharmacy:    [facility] [address] [facility] [address] [facility] [address]  [facility] [address] [facility]    Electronically signed by FIDENCIO Platt   Date: 08/26/21                    Asthma Triggers  How To Control Things That Make Your Asthma Worse    Triggers are things that make your asthma worse.  Look at the list below to help you find your triggers and   what you can do about them. You can help prevent asthma flare-ups by staying away from your triggers.      Trigger                                                          What you can do   Cigarette Smoke  Tobacco smoke can make asthma worse. Do not allow smoking in your home, car or around you.  Be sure no one smokes at a child s day care or school.  If you smoke, ask your health care provider for ways to help you quit.  Ask family members to quit too.  Ask your health care provider for a referral to Quit Plan to help you quit smoking, or call 7-909-571-PLAN.     Colds, Flu, Bronchitis  These are common triggers of asthma. Wash your hands often.  Don t touch your eyes, nose or mouth.  Get a flu shot every year.     Dust Mites  These are tiny bugs that live in cloth or carpet. They are too small to see. Wash sheets and blankets in hot water every week.   Encase pillows and mattress in dust mite proof covers.  Avoid having carpet if you can. If you have carpet, vacuum weekly.   Use a dust mask and HEPA vacuum.   Pollen and Outdoor Mold  Some people are allergic to trees, grass, or weed pollen, or molds. Try to keep your windows closed.  Limit time out doors when pollen count is high.   Ask you health care provider about taking medicine during allergy season.     Animal Dander  Some people are  allergic to skin flakes, urine or saliva from pets with fur or feathers. Keep pets with fur or feathers out of your home.    If you can t keep the pet outdoors, then keep the pet out of your bedroom.  Keep the bedroom door closed.  Keep pets off cloth furniture and away from stuffed toys.     Mice, Rats, and Cockroaches  Some people are allergic to the waste from these pests.   Cover food and garbage.  Clean up spills and food crumbs.  Store grease in the refrigerator.   Keep food out of the bedroom.   Indoor Mold  This can be a trigger if your home has high moisture. Fix leaking faucets, pipes, or other sources of water.   Clean moldy surfaces.  Dehumidify basement if it is damp and smelly.   Smoke, Strong Odors, and Sprays  These can reduce air quality. Stay away from strong odors and sprays, such as perfume, powder, hair spray, paints, smoke incense, paint, cleaning products, candles and new carpet.   Exercise or Sports  Some people with asthma have this trigger. Be active!  Ask your doctor about taking medicine before sports or exercise to prevent symptoms.    Warm up for 5-10 minutes before and after sports or exercise.     Other Triggers of Asthma  Cold air:  Cover your nose and mouth with a scarf.  Sometimes laughing or crying can be a trigger.  Some medicines and food can trigger asthma.
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
## 2021-08-26 NOTE — PATIENT INSTRUCTIONS
Thank you for choosing [facility].   I have office hours 8:00 am to 4:30 pm on Monday's, Wednesday's, Thursday's and Friday's. My nurse and I are out of the office every Tuesday.    Following your visit, when your labs and diagnostic testing have returned, I will review then and you will be contacted by my nurse.  If you are on My Chart, you can also view results there.    For refills, notify your pharmacy regarding what you need and the pharmacy will generate a refill request. Do not call my nurse as she is unable to process refill request. Please plan ahead and allow 3-5 days for refill requests.    You will generally receive a reminder call the day prior to your appointment.  If you cannot attend your appointment, please cancel your appointment with as much notice as possible.  If there is a pattern of failure to present for your appointments, I cannot provide consistent, meaningful, ongoing care for you. It is very important to me that you come in for your care, so we can best assist you with your health care needs.    IMPORTANT:  Please note that it is my standard of practice to NOT participate in prescribing ongoing requested Narcotic Analgesic therapy, and/or participate in the prescribing of other controlled substances.  My nurse and I am happy to assist you with the process of referral for alternative pain management as needed, and other treatment modalities including but not limited to:  Physical Therapy, Physical Medicine and Rehab, Counseling, Chiropractic Care, Orthopedic Care, and non-narcotic medication management.     In the event that you need to be seen for emergent concerns and I am out of office,  please see one of my colleagues for acute concerns.  You may also present to  or ER.  I appreciate the opportunity to serve you and look forward to supporting your healthcare needs in the future. Please contact me with any questions or concerns that you may  have.    Sincerely,      Pema Allison RN, PA-C

## 2021-08-27 ASSESSMENT — ANXIETY QUESTIONNAIRES: GAD7 TOTAL SCORE: 0

## 2021-08-27 ASSESSMENT — ASTHMA QUESTIONNAIRES: ACT_TOTALSCORE: 11

## 2021-10-03 ENCOUNTER — HEALTH MAINTENANCE LETTER (OUTPATIENT)
Age: 21
End: 2021-10-03

## 2021-10-04 ENCOUNTER — HOSPITAL ENCOUNTER (EMERGENCY)
Facility: HOSPITAL | Age: 21
Discharge: HOME | End: 2021-10-04
Attending: INTERNAL MEDICINE | Admitting: INTERNAL MEDICINE
Payer: COMMERCIAL

## 2021-10-04 VITALS
OXYGEN SATURATION: 97 % | HEART RATE: 62 BPM | DIASTOLIC BLOOD PRESSURE: 85 MMHG | RESPIRATION RATE: 16 BRPM | TEMPERATURE: 96.7 F | SYSTOLIC BLOOD PRESSURE: 133 MMHG

## 2021-10-04 DIAGNOSIS — R00.2 PALPITATIONS: ICD-10-CM

## 2021-10-04 DIAGNOSIS — F41.9 ANXIETY: ICD-10-CM

## 2021-10-04 PROCEDURE — 93010 ELECTROCARDIOGRAM REPORT: CPT | Performed by: INTERNAL MEDICINE

## 2021-10-04 PROCEDURE — 93005 ELECTROCARDIOGRAM TRACING: CPT

## 2021-10-04 PROCEDURE — 99284 EMERGENCY DEPT VISIT MOD MDM: CPT | Performed by: INTERNAL MEDICINE

## 2021-10-04 PROCEDURE — 99283 EMERGENCY DEPT VISIT LOW MDM: CPT

## 2021-10-04 NOTE — ED NOTES
"Patient ambulatory to ED room 4. Patient states that he used his inhaler around 2230 this past evening. Around 1130 patient started to feel \"palpitations\", states \"I've had them before, but not this long\". Patient then started to develop some left sided chest discomfort. Patient denies nausea, vomiting, dizziness or shortness of breath. EKG in process.   "

## 2021-10-14 ASSESSMENT — ENCOUNTER SYMPTOMS
BACK PAIN: 0
ABDOMINAL DISTENTION: 0
LIGHT-HEADEDNESS: 0
FLANK PAIN: 0
NECK PAIN: 0
DIZZINESS: 0
PALPITATIONS: 1
SHORTNESS OF BREATH: 0
CHEST TIGHTNESS: 0
WEAKNESS: 0
COLOR CHANGE: 0
BLOOD IN STOOL: 0
NAUSEA: 0
CONFUSION: 0
COUGH: 0
CHILLS: 0
FREQUENCY: 0
ABDOMINAL PAIN: 0
SLEEP DISTURBANCE: 0
NUMBNESS: 0
ANAL BLEEDING: 0
HEADACHES: 0
WHEEZING: 0
DIAPHORESIS: 0
FEVER: 0
VOICE CHANGE: 0
MYALGIAS: 0
DYSURIA: 0
VOMITING: 0

## 2021-10-14 NOTE — ED PROVIDER NOTES
History     Chief Complaint   Patient presents with     Palpitations     The history is provided by the patient.   Palpitations  Palpitations quality:  Regular  Onset quality:  Gradual  Timing:  Sporadic  Progression:  Improving  Chronicity:  New  Associated symptoms: no back pain, no chest pain, no cough, no diaphoresis, no dizziness, no nausea, no numbness, no shortness of breath, no vomiting and no weakness        Allergies:  Allergies   Allergen Reactions     Sulfa Drugs      Unknown rxn. Rxn as infant       Problem List:    Patient Active Problem List    Diagnosis Date Noted     Cough variant asthma 06/09/2020     Priority: Medium     Seasonal allergic rhinitis, unspecified trigger 06/09/2020     Priority: Medium     Psychophysiologic insomnia 08/06/2019     Priority: Medium     Tobacco use disorder 06/06/2019     Priority: Medium     JENAE (generalized anxiety disorder) 03/06/2019     Priority: Medium     Episodic tension-type headache, not intractable 11/02/2018     Priority: Medium        Past Medical History:    No past medical history on file.    Past Surgical History:    Past Surgical History:   Procedure Laterality Date     ENDOSCOPIC SINUS SURGERY Bilateral 8/26/2020    Procedure: BILATERAL ENDOSCOPIC SINUS SURGERY WITH PROPEL;  Surgeon: Lianne Mejía MD;  Location: [facility]      TOOTH EXTRACTION W/FORCEP       SEPTOPLASTY, TURBINOPLASTY, COMBINED Bilateral 8/26/2020    Procedure: SEPTOPLASTY, TURBINATE REDUCTION;  Surgeon: Lianne Mejía MD;  Location: [facility]       Family History:    Family History   Problem Relation Age of Onset     Asthma Mother      Anxiety Disorder Mother      Psychotic Disorder Father         panic attacks     Hypertension Father      Family History Negative Maternal Grandmother      Family History Negative Brother      Anxiety Disorder Brother         panic attack, drug abuse     Hypertension Maternal Grandfather      C.A.DBebe Maternal Grandfather      Family History  Negative Paternal Grandmother      Family History Negative Paternal Grandfather      Gastrointestinal Disease Maternal Aunt         crohns     Heart Disease Other         family history Afib     Heart Disease Other         family history mitral valve     Thyroid Disease No family hx of        Social History:  Marital Status:  Single [1]  Social History     Tobacco Use     Smoking status: Former Smoker     Types: Dip, chew, snus or snuff, Other     Quit date: 10/1/2018     Years since quitting: 3.0     Smokeless tobacco: Former User     Types: Chew     Quit date: 8/28/2020     Tobacco comment: e cig    Substance Use Topics     Alcohol use: Yes     Comment: occ     Drug use: No        Medications:    Ashwagandha 125 MG CAPS  fluticasone (FLONASE) 50 MCG/ACT nasal spray  fluticasone-salmeterol (AIRDUO RESPICLICK) 232-14 MCG/ACT inhaler  loratadine (CLARITIN) 10 MG tablet  multivitamin w/minerals (THERA-VIT-M) tablet  VENTOLIN  (90 Base) MCG/ACT inhaler  Vitamin D, Cholecalciferol, 25 MCG (1000 UT) CAPS          Review of Systems   Constitutional: Negative for chills, diaphoresis and fever.   HENT: Negative for voice change.    Eyes: Negative for visual disturbance.   Respiratory: Negative for cough, chest tightness, shortness of breath and wheezing.    Cardiovascular: Positive for palpitations. Negative for chest pain and leg swelling.   Gastrointestinal: Negative for abdominal distention, abdominal pain, anal bleeding, blood in stool, nausea and vomiting.   Genitourinary: Negative for decreased urine volume, dysuria, flank pain and frequency.   Musculoskeletal: Negative for back pain, gait problem, myalgias and neck pain.   Skin: Negative for color change, pallor and rash.   Neurological: Negative for dizziness, syncope, weakness, light-headedness, numbness and headaches.   Psychiatric/Behavioral: Negative for confusion, sleep disturbance and suicidal ideas.   All other systems reviewed and are  negative.      Physical Exam   BP: 125/84  Pulse: 57  Temp: (!) 96.7  F (35.9  C)  Resp: 16  SpO2: 99 %      Physical Exam  Vitals and nursing note reviewed.   Constitutional:       Appearance: He is well-developed.   HENT:      Head: Normocephalic and atraumatic.   Eyes:      Conjunctiva/sclera: Conjunctivae normal.      Pupils: Pupils are equal, round, and reactive to light.   Neck:      Thyroid: No thyromegaly.      Vascular: No JVD.      Trachea: No tracheal deviation.   Cardiovascular:      Rate and Rhythm: Normal rate and regular rhythm.      Heart sounds: Normal heart sounds. No murmur heard.   No gallop.    Pulmonary:      Effort: Pulmonary effort is normal. No respiratory distress.      Breath sounds: Normal breath sounds. No stridor. No wheezing or rales.   Chest:      Chest wall: No tenderness.   Abdominal:      General: Bowel sounds are normal. There is no distension.      Palpations: Abdomen is soft. There is no mass.      Tenderness: There is no abdominal tenderness. There is no guarding or rebound.   Musculoskeletal:         General: No tenderness. Normal range of motion.      Cervical back: Normal range of motion and neck supple.   Lymphadenopathy:      Cervical: No cervical adenopathy.   Skin:     General: Skin is warm.      Coloration: Skin is not pale.      Findings: No erythema or rash.   Neurological:      Mental Status: He is alert and oriented to person, place, and time.   Psychiatric:         Behavior: Behavior normal.         ED Course        Procedures                No results found for this or any previous visit (from the past 24 hour(s)).    Medications - No data to display    Assessments & Plan (with Medical Decision Making)   Palpitation , already resolved  EKG ; sinus gamaliel  CXR normal    Unclear etiology , can be followed outpatient  D C home, follow-up with   I have reviewed the nursing notes.    I have reviewed the findings, diagnosis, plan and need for follow up with the  patient.      Discharge Medication List as of 10/4/2021  2:29 AM          Final diagnoses:   Palpitations   Anxiety       10/4/2021   [facility]     Chauncey Lee MD  10/14/21 1936

## 2021-10-27 ENCOUNTER — TELEPHONE (OUTPATIENT)
Dept: FAMILY MEDICINE | Facility: OTHER | Age: 21
End: 2021-10-27

## 2021-10-27 NOTE — TELEPHONE ENCOUNTER
Emergency Department and Urgent Care Follow-up      Reason for ER/UC visit: anxiety/palpitations  o Date seen: 10/4      New or Worsening symptoms:  Still having palpitations       Prescription Received/Picked up from Pharmacy?: no   o Medications started? na  o Any questions or issues regarding your prescription?: na      Follow-up Results or Labs that are pending: none      Questions or concerns?: ongoing palpitations      ER Recommends Follow-up by: in 1 week      RN Recommendations: per ER   Appointment scheduled: yes,   Next 5 appointments (look out 90 days)    Nov 01, 2021  9:20 AM  (Arrive by 9:05 AM)  SHORT with ZOHRA Jean CNP  [facility] [place] ([facility] [place] ) [address]  [place] [address]  [phone]          If you start feeling worse, or have any further questions, please feel free to contact Nurse Triage at [phone].  If needing immediate medical attention at any time please call 911/Go to the ER.

## 2021-10-28 NOTE — PROGRESS NOTES
Assessment & Plan     Palpitations  Reviewed labs  Waiting on Zio Patch and sleep study  He feels that his palpitations maybe related to poor quality sleep.  He states he is able to sleep, but never feels rested.   - Leadless EKG Monitor 8 to 14 Days; Future  - TSH with free T4 reflex; Future  - Comprehensive metabolic panel (BMP + Alb, Alk Phos, ALT, AST, Total. Bili, TP); Future  - CBC with platelets and differential; Future  - SLEEP EVALUATION & MANAGEMENT REFERRAL - ADULT -; Future  - CBC with platelets and differential  - Comprehensive metabolic panel (BMP + Alb, Alk Phos, ALT, AST, Total. Bili, TP)  - TSH with free T4 reflex    Has daytime drowsiness  He feels that his palpitations maybe related to poor quality sleep.  He states he is able to sleep, but never feels rested.   - SLEEP EVALUATION & MANAGEMENT REFERRAL - ADULT -; Future        See Patient Instructions    No follow-ups on file.    ZOHRA Hooper [facility] - [place] G[place] is a 21 year old who presents for the following health issues     HPI     ED/UC Followup:    Facility:  [facility] [place]  Date of visit: 10/4/2021  Reason for visit: Palpitations, anxiety  Reviewed ER Note: EKG:sinus gamaliel, Normal chest XR, no labs at that time - palpitations cleared by evaluation in the ER   Current Status: Currently still having palpitations daily. States that they last all day. Thursday 10/28/2021 while exercising he stated that palpitations came on stronger   States he has not tried any treatment. - intermittent palpitations they come and go.    Drinks 2 cups of coffee and shot of energy per day  Sleep - poor, states he falls asleep, but does not feel rested when he wakes up  Exercising 4 days per week 1-2 hours at a time weight lifting no cardio  Water - states he drinks 26 ounces of water 3-4 times a day   States increased stress and anxiety over the past couple of months, but nothing too bad  Denies alcohol, drug use or  smoking         Review of Systems   CONSTITUTIONAL: NEGATIVE for fever, chills, change in weight  INTEGUMENTARY/SKIN: NEGATIVE for worrisome rashes, moles or lesions  EYES: NEGATIVE for vision changes or irritation  ENT/MOUTH: NEGATIVE for ear, mouth and throat problems  RESP:Hx asthma - worsening at night recently   CV: palpitations come and go,   GI: NEGATIVE for nausea, abdominal pain, heartburn, or change in bowel habits  : negative for dysuria, hematuria, decreased urinary stream, erectile dysfunction  MUSCULOSKELETAL: NEGATIVE for significant arthralgias or myalgia  NEURO: NEGATIVE for weakness, dizziness or paresthesias  ENDOCRINE: NEGATIVE for temperature intolerance, skin/hair changes  PSYCHIATRIC: HX anxiety      Objective    /62 (BP Location: Left arm, Patient Position: Chair, Cuff Size: Adult Regular)   Pulse 68   Temp 98.1  F (36.7  C) (Tympanic)   Resp 18   Wt 93 kg (205 lb)   SpO2 99%   BMI 24.95 kg/m    Body mass index is 24.95 kg/m .  Physical Exam   GENERAL: healthy, alert and no distress  EYES: Eyes grossly normal to inspection, PERRL and conjunctivae and sclerae normal  HENT: ear canals and TM's normal, nose and mouth without ulcers or lesions  NECK: no adenopathy, no asymmetry, masses, or scars and thyroid normal to palpation  RESP: lungs clear to auscultation - no rales, rhonchi or wheezes  CV: regular rate and rhythm, normal S1 S2, no S3 or S4, no murmur, click or rub, no peripheral edema and peripheral pulses strong  ABDOMEN: soft, nontender, no hepatosplenomegaly, no masses and bowel sounds normal  MS: no gross musculoskeletal defects noted, no edema  SKIN: no suspicious lesions or rashes  NEURO: Normal strength and tone, sensory exam grossly normal, mentation intact, speech normal and cranial nerves 2-12 intact  PSYCH: mentation appears normal, affect normal/bright  LYMPH: normal ant/post cervical, supraclavicular nodes    Results for orders placed or performed in visit on  11/01/21   Comprehensive metabolic panel (BMP + Alb, Alk Phos, ALT, AST, Total. Bili, TP)     Status: Normal   Result Value Ref Range    Sodium 138 133 - 144 mmol/L    Potassium 4.0 3.4 - 5.3 mmol/L    Chloride 107 94 - 109 mmol/L    Carbon Dioxide (CO2) 28 20 - 32 mmol/L    Anion Gap 3 3 - 14 mmol/L    Urea Nitrogen 18 7 - 30 mg/dL    Creatinine 0.74 0.66 - 1.25 mg/dL    Calcium 9.1 8.5 - 10.1 mg/dL    Glucose 92 70 - 99 mg/dL    Alkaline Phosphatase 82 40 - 150 U/L    AST 26 0 - 45 U/L    ALT 58 0 - 70 U/L    Protein Total 7.9 6.8 - 8.8 g/dL    Albumin 4.1 3.4 - 5.0 g/dL    Bilirubin Total 0.7 0.2 - 1.3 mg/dL    GFR Estimate >90 >60 mL/min/1.73m2   TSH with free T4 reflex     Status: Normal   Result Value Ref Range    TSH 1.94 0.40 - 4.00 mU/L   CBC with platelets and differential     Status: None   Result Value Ref Range    WBC Count 5.3 4.0 - 11.0 10e3/uL    RBC Count 4.92 4.40 - 5.90 10e6/uL    Hemoglobin 15.0 13.3 - 17.7 g/dL    Hematocrit 45.4 40.0 - 53.0 %    MCV 92 78 - 100 fL    MCH 30.5 26.5 - 33.0 pg    MCHC 33.0 31.5 - 36.5 g/dL    RDW 12.4 10.0 - 15.0 %    Platelet Count 220 150 - 450 10e3/uL    % Neutrophils 61 %    % Lymphocytes 28 %    % Monocytes 7 %    % Eosinophils 2 %    % Basophils 1 %    % Immature Granulocytes 1 %    NRBCs per 100 WBC 0 <1 /100    Absolute Neutrophils 3.3 1.6 - 8.3 10e3/uL    Absolute Lymphocytes 1.5 0.8 - 5.3 10e3/uL    Absolute Monocytes 0.4 0.0 - 1.3 10e3/uL    Absolute Eosinophils 0.1 0.0 - 0.7 10e3/uL    Absolute Basophils 0.0 0.0 - 0.2 10e3/uL    Absolute Immature Granulocytes 0.0 <=0.0 10e3/uL    Absolute NRBCs 0.0 10e3/uL   CBC with platelets and differential     Status: None    Narrative    The following orders were created for panel order CBC with platelets and differential.  Procedure                               Abnormality         Status                     ---------                               -----------         ------                     CBC with platelets  and kali.[[id number]]                      Final result                 Please view results for these tests on the individual orders.

## 2021-11-01 ENCOUNTER — OFFICE VISIT (OUTPATIENT)
Dept: FAMILY MEDICINE | Facility: OTHER | Age: 21
End: 2021-11-01
Attending: NURSE PRACTITIONER
Payer: COMMERCIAL

## 2021-11-01 VITALS
DIASTOLIC BLOOD PRESSURE: 62 MMHG | WEIGHT: 205 LBS | RESPIRATION RATE: 18 BRPM | HEART RATE: 68 BPM | SYSTOLIC BLOOD PRESSURE: 110 MMHG | TEMPERATURE: 98.1 F | BODY MASS INDEX: 24.95 KG/M2 | OXYGEN SATURATION: 99 %

## 2021-11-01 DIAGNOSIS — R40.0 HAS DAYTIME DROWSINESS: ICD-10-CM

## 2021-11-01 DIAGNOSIS — R00.2 PALPITATIONS: Primary | ICD-10-CM

## 2021-11-01 LAB
ALBUMIN SERPL-MCNC: 4.1 G/DL (ref 3.4–5)
ALP SERPL-CCNC: 82 U/L (ref 40–150)
ALT SERPL W P-5'-P-CCNC: 58 U/L (ref 0–70)
ANION GAP SERPL CALCULATED.3IONS-SCNC: 3 MMOL/L (ref 3–14)
AST SERPL W P-5'-P-CCNC: 26 U/L (ref 0–45)
BASOPHILS # BLD AUTO: 0 10E3/UL (ref 0–0.2)
BASOPHILS NFR BLD AUTO: 1 %
BILIRUB SERPL-MCNC: 0.7 MG/DL (ref 0.2–1.3)
BUN SERPL-MCNC: 18 MG/DL (ref 7–30)
CALCIUM SERPL-MCNC: 9.1 MG/DL (ref 8.5–10.1)
CHLORIDE BLD-SCNC: 107 MMOL/L (ref 94–109)
CO2 SERPL-SCNC: 28 MMOL/L (ref 20–32)
CREAT SERPL-MCNC: 0.74 MG/DL (ref 0.66–1.25)
EOSINOPHIL # BLD AUTO: 0.1 10E3/UL (ref 0–0.7)
EOSINOPHIL NFR BLD AUTO: 2 %
ERYTHROCYTE [DISTWIDTH] IN BLOOD BY AUTOMATED COUNT: 12.4 % (ref 10–15)
GFR SERPL CREATININE-BSD FRML MDRD: >90 ML/MIN/1.73M2
GLUCOSE BLD-MCNC: 92 MG/DL (ref 70–99)
HCT VFR BLD AUTO: 45.4 % (ref 40–53)
HGB BLD-MCNC: 15 G/DL (ref 13.3–17.7)
IMM GRANULOCYTES # BLD: 0 10E3/UL
IMM GRANULOCYTES NFR BLD: 1 %
LYMPHOCYTES # BLD AUTO: 1.5 10E3/UL (ref 0.8–5.3)
LYMPHOCYTES NFR BLD AUTO: 28 %
MCH RBC QN AUTO: 30.5 PG (ref 26.5–33)
MCHC RBC AUTO-ENTMCNC: 33 G/DL (ref 31.5–36.5)
MCV RBC AUTO: 92 FL (ref 78–100)
MONOCYTES # BLD AUTO: 0.4 10E3/UL (ref 0–1.3)
MONOCYTES NFR BLD AUTO: 7 %
NEUTROPHILS # BLD AUTO: 3.3 10E3/UL (ref 1.6–8.3)
NEUTROPHILS NFR BLD AUTO: 61 %
NRBC # BLD AUTO: 0 10E3/UL
NRBC BLD AUTO-RTO: 0 /100
PLATELET # BLD AUTO: 220 10E3/UL (ref 150–450)
POTASSIUM BLD-SCNC: 4 MMOL/L (ref 3.4–5.3)
PROT SERPL-MCNC: 7.9 G/DL (ref 6.8–8.8)
RBC # BLD AUTO: 4.92 10E6/UL (ref 4.4–5.9)
SODIUM SERPL-SCNC: 138 MMOL/L (ref 133–144)
TSH SERPL DL<=0.005 MIU/L-ACNC: 1.94 MU/L (ref 0.4–4)
WBC # BLD AUTO: 5.3 10E3/UL (ref 4–11)

## 2021-11-01 PROCEDURE — 36415 COLL VENOUS BLD VENIPUNCTURE: CPT | Performed by: NURSE PRACTITIONER

## 2021-11-01 PROCEDURE — 80050 GENERAL HEALTH PANEL: CPT | Performed by: NURSE PRACTITIONER

## 2021-11-01 PROCEDURE — 99213 OFFICE O/P EST LOW 20 MIN: CPT | Performed by: NURSE PRACTITIONER

## 2021-11-01 ASSESSMENT — PAIN SCALES - GENERAL: PAINLEVEL: NO PAIN (0)

## 2021-11-01 NOTE — NURSING NOTE
"Chief Complaint   Patient presents with     ER F/U       Initial /62 (BP Location: Left arm, Patient Position: Chair, Cuff Size: Adult Regular)   Pulse 68   Temp 98.1  F (36.7  C) (Tympanic)   Resp 18   Wt 93 kg (205 lb)   SpO2 99%   BMI 24.95 kg/m   Estimated body mass index is 24.95 kg/m  as calculated from the following:    Height as of 8/26/21: 1.93 m (6' 4\").    Weight as of this encounter: 93 kg (205 lb).  Medication Reconciliation: complete  Sophie Lorenzo LPN  "

## 2021-11-01 NOTE — PATIENT INSTRUCTIONS
Patient Education     Understanding Heart Palpitations    Heart palpitations are the feeling you have when your heartbeat seems to be racing, pounding, skipping, or fluttering. Heart palpitations are most often felt in the chest. Sometimes, they may also be felt in the neck.  What causes heart palpitations?  In most cases, heart palpitations are caused by:    Stress or anxiety    Exercise    Pregnancy    Some medicines    Caffeine    Nicotine    Alcohol    Illegal drugs, such as cocaine    Health problems, such as anemia or overactive thyroid  Many heart palpitations are harmless. But in some cases, palpitations may be caused by a problem with the heart such as an abnormal heart rhythm (arrhythmia). They may need to be managed by you and your healthcare provider or treated right away.  How are heart palpitations treated?  Treatments for heart palpitations depend on the cause. Options may include:    Managing the things that trigger your heart palpitations. This could mean:  ? Learning ways to reduce stress and anxiety  ? Staying away from caffeine, nicotine, alcohol, and illegal drugs  ? Stopping the use of certain medicines, under your doctor s guidance    Medicines, procedures, or surgery to treat an arrhythmia or other health problem that is causing your symptoms  What are possible complications of heart palpitations?  Complications of heart palpitations are rare unless they are caused by a problem such as an arrhythmia. In such cases, complications can include:    Fainting    Heart failure. This problem occurs when the heart is so weak it no longer pumps blood well.    Blood clots and stroke    Sudden cardiac arrest. This problem occurs when the heart suddenly stops beating.  When should I call my healthcare provider?  Call your healthcare provider right away if you have any of these:    Palpitations that prevent you from sleeping or otherwise affect your quality of life.    Symptoms that don t get better with  treatment, or symptoms that get worse    New symptoms, such as chest pain, shortness of breath, dizziness, or fainting  [employer] last reviewed this educational content on 6/1/2019 2000-2021 [employer]. All rights reserved. This information is not intended as a substitute for professional medical care. Always follow your healthcare professional's instructions.

## 2021-11-02 ENCOUNTER — HOSPITAL ENCOUNTER (OUTPATIENT)
Dept: CARDIOLOGY | Facility: HOSPITAL | Age: 21
Discharge: HOME | End: 2021-11-02
Attending: NURSE PRACTITIONER | Admitting: INTERNAL MEDICINE
Payer: COMMERCIAL

## 2021-11-02 DIAGNOSIS — R00.2 PALPITATIONS: ICD-10-CM

## 2021-11-02 PROCEDURE — 93248 EXT ECG>7D<15D REV&INTERPJ: CPT | Performed by: INTERNAL MEDICINE

## 2021-11-02 PROCEDURE — 93246 EXT ECG>7D<15D RECORDING: CPT

## 2022-01-04 ENCOUNTER — TELEPHONE (OUTPATIENT)
Dept: FAMILY MEDICINE | Facility: OTHER | Age: 22
End: 2022-01-04
Payer: COMMERCIAL

## 2022-01-04 NOTE — TELEPHONE ENCOUNTER
Received a PA from [employer] for Fluticasone-Salmeterol 232-14MCG/ACT aerosol powder. Submitted on CMM. Waiting for a response.

## 2022-01-05 NOTE — TELEPHONE ENCOUNTER
Received a DENIAL from [employer] for Fluticasone-Salmeterol 232-14mcg/act aerosol powder.     Forms scanned to Epic.

## 2022-01-22 ENCOUNTER — HEALTH MAINTENANCE LETTER (OUTPATIENT)
Age: 22
End: 2022-01-22

## 2022-02-05 DIAGNOSIS — R05.8 ALLERGIC COUGH: ICD-10-CM

## 2022-02-05 DIAGNOSIS — J45.40 MODERATE PERSISTENT ASTHMA, UNSPECIFIED WHETHER COMPLICATED: ICD-10-CM

## 2022-02-05 DIAGNOSIS — J45.991 COUGH VARIANT ASTHMA: Primary | ICD-10-CM

## 2022-02-08 RX ORDER — DEXAMETHASONE 4 MG/1
TABLET ORAL
Qty: 12 G | Refills: 0 | OUTPATIENT
Start: 2022-02-08

## 2022-02-08 NOTE — TELEPHONE ENCOUNTER
Pt of Debbie Alston-Not on current Epic medication list.Please advise.    Note Airdou Respiclick.      Next 5 appointments (look out 90 days)    Feb 18, 2022  4:30 PM  (Arrive by 4:15 PM)  SHORT with FIDENCIO Reyes  [facility] [place] ([facility] [place] ) [address]  [place] [address]  [phone]           Routing refill request to provider for review/approval because:  Drug not on the [facility], [facility] or [facility] refill protocol or controlled substance    Pham Harper RN

## 2022-02-09 RX ORDER — ALBUTEROL SULFATE 90 UG/1
AEROSOL, METERED RESPIRATORY (INHALATION)
Qty: 18 G | Refills: 1 | Status: SHIPPED | OUTPATIENT
Start: 2022-02-09 | End: 2022-07-29

## 2022-02-09 RX ORDER — FLUTICASONE PROPIONATE 110 UG/1
2 AEROSOL, METERED RESPIRATORY (INHALATION) 2 TIMES DAILY
Qty: 36 G | Refills: 3 | Status: SHIPPED | OUTPATIENT
Start: 2022-02-09 | End: 2022-03-18

## 2022-02-10 ENCOUNTER — TELEPHONE (OUTPATIENT)
Dept: FAMILY MEDICINE | Facility: OTHER | Age: 22
End: 2022-02-10
Payer: COMMERCIAL

## 2022-02-10 NOTE — TELEPHONE ENCOUNTER
Received a PA from [employer] for Ventolin  (90 Base)MCG/ACT aerosol. Submitted on [facility]. Received an instant APPROVAL. Effective 01/11/2022 to 02/10/2023. Forms scanned to [facility].

## 2022-02-17 NOTE — PROGRESS NOTES
"  Assessment & Plan     (J45.40) Uncontrolled moderate persistent asthma  (primary encounter diagnosis)  Comment: he is having stomach issues and asthma is flaring. He is miserable. Waking at night inhaler helps him. We are going to get him Carafate and his inhalers haven't been covered.  We are going to add back in Symbicort and then give albuterol. No nebs at home Asthma score is awful.   Plan: budesonide-formoterol (SYMBICORT) 160-4.5         MCG/ACT Inhaler, albuterol (PROAIR         HFA/PROVENTIL HFA/VENTOLIN HFA) 108 (90 Base)         MCG/ACT inhaler, predniSONE (DELTASONE) 20 MG         tablet, guaiFENesin (MUCINEX) 600 MG 12 hr         tablet, sucralfate (CARAFATE) 1 GM tablet        4 to 6-week follow-up      Review of external notes as documented elsewhere in note  Ordering of each unique test  Prescription drug management  10 minutes spent on the date of the encounter doing chart review, patient visit and documentation        BMI:   Estimated body mass index is 25.6 kg/m  as calculated from the following:    Height as of this encounter: 1.905 m (6' 3\").    Weight as of this encounter: 92.9 kg (204 lb 12.8 oz).       See Patient Instructions    No follow-ups on file.    FIDENCIO Platt  [facility] [place] is a 21 year old who presents for the following health issues     HPI     Asthma Follow-Up    Was ACT completed today?    Yes    ACT Total Scores 2/18/2022   ACT TOTAL SCORE (Goal Greater than or Equal to 20) 9   In the past 12 months, how many times did you visit the emergency room for your asthma without being admitted to the hospital? 0   In the past 12 months, how many times were you hospitalized overnight because of your asthma? 0          How many days per week do you miss taking your asthma controller medication?  0    Please describe any recent triggers for your asthma: Gastric Reflux    Have you had any Emergency Room Visits, Urgent Care Visits, or " "Hospital Admissions since your last office visit?  No          Review of Systems   CONSTITUTIONAL: NEGATIVE for fever, chills, change in weight  INTEGUMENTARY/SKIN: NEGATIVE for worrisome rashes, moles or lesions  EYES: NEGATIVE for vision changes or irritation  ENT/MOUTH: NEGATIVE for ear, mouth and throat problems  RESP:see hpi  CV: NEGATIVE for chest pain, palpitations or peripheral edema  GI: NEGATIVE for nausea, abdominal pain, heartburn, or change in bowel habits  : NEGATIVE for frequency, dysuria, or hematuria  MUSCULOSKELETAL: NEGATIVE for significant arthralgias or myalgia  NEURO: NEGATIVE for weakness, dizziness or paresthesias  ENDOCRINE: NEGATIVE for temperature intolerance, skin/hair changes  HEME: NEGATIVE for bleeding problems  PSYCHIATRIC: NEGATIVE for changes in mood or affect      Objective    /60 (BP Location: Right arm, Patient Position: Sitting, Cuff Size: Adult Regular)   Pulse 67   Temp 97.2  F (36.2  C) (Tympanic)   Ht 1.905 m (6' 3\")   Wt 92.9 kg (204 lb 12.8 oz)   SpO2 98%   BMI 25.60 kg/m    Body mass index is 25.6 kg/m .  Physical Exam   GENERAL: healthy, alert and no distress  EYES: Eyes grossly normal to inspection, PERRL and conjunctivae and sclerae normal  HENT: ear canals and TM's normal, nose and mouth without ulcers or lesions  NECK: no adenopathy, no asymmetry, masses, or scars and thyroid normal to palpation  RESP: lungs clear to auscultation - no rales, rhonchi or wheezes  CV: regular rate and rhythm, normal S1 S2, no S3 or S4, no murmur, click or rub, no peripheral edema and peripheral pulses strong  ABDOMEN: soft, nontender, no hepatosplenomegaly, no masses and bowel sounds normal  MS: no gross musculoskeletal defects noted, no edema    Office Visit on 11/01/2021   Component Date Value Ref Range Status     Sodium 11/01/2021 138  133 - 144 mmol/L Final     Potassium 11/01/2021 4.0  3.4 - 5.3 mmol/L Final     Chloride 11/01/2021 107  94 - 109 mmol/L Final     " Carbon Dioxide (CO2) 11/01/2021 28  20 - 32 mmol/L Final     Anion Gap 11/01/2021 3  3 - 14 mmol/L Final     Urea Nitrogen 11/01/2021 18  7 - 30 mg/dL Final     Creatinine 11/01/2021 0.74  0.66 - 1.25 mg/dL Final     Calcium 11/01/2021 9.1  8.5 - 10.1 mg/dL Final     Glucose 11/01/2021 92  70 - 99 mg/dL Final     Alkaline Phosphatase 11/01/2021 82  40 - 150 U/L Final     AST 11/01/2021 26  0 - 45 U/L Final     ALT 11/01/2021 58  0 - 70 U/L Final     Protein Total 11/01/2021 7.9  6.8 - 8.8 g/dL Final     Albumin 11/01/2021 4.1  3.4 - 5.0 g/dL Final     Bilirubin Total 11/01/2021 0.7  0.2 - 1.3 mg/dL Final     GFR Estimate 11/01/2021 >90  >60 mL/min/1.73m2 Final    As of July 11, 2021, eGFR is calculated by the CKD-EPI creatinine equation, without race adjustment. eGFR can be influenced by muscle mass, exercise, and diet. The reported eGFR is an estimation only and is only applicable if the renal function is stable.     TSH 11/01/2021 1.94  0.40 - 4.00 mU/L Final     WBC Count 11/01/2021 5.3  4.0 - 11.0 10e3/uL Final     RBC Count 11/01/2021 4.92  4.40 - 5.90 10e6/uL Final     Hemoglobin 11/01/2021 15.0  13.3 - 17.7 g/dL Final     Hematocrit 11/01/2021 45.4  40.0 - 53.0 % Final     MCV 11/01/2021 92  78 - 100 fL Final     MCH 11/01/2021 30.5  26.5 - 33.0 pg Final     MCHC 11/01/2021 33.0  31.5 - 36.5 g/dL Final     RDW 11/01/2021 12.4  10.0 - 15.0 % Final     Platelet Count 11/01/2021 220  150 - 450 10e3/uL Final     % Neutrophils 11/01/2021 61  % Final     % Lymphocytes 11/01/2021 28  % Final     % Monocytes 11/01/2021 7  % Final     % Eosinophils 11/01/2021 2  % Final     % Basophils 11/01/2021 1  % Final     % Immature Granulocytes 11/01/2021 1  % Final     NRBCs per 100 WBC 11/01/2021 0  <1 /100 Final     Absolute Neutrophils 11/01/2021 3.3  1.6 - 8.3 10e3/uL Final     Absolute Lymphocytes 11/01/2021 1.5  0.8 - 5.3 10e3/uL Final     Absolute Monocytes 11/01/2021 0.4  0.0 - 1.3 10e3/uL Final     Absolute  Eosinophils 11/01/2021 0.1  0.0 - 0.7 10e3/uL Final     Absolute Basophils 11/01/2021 0.0  0.0 - 0.2 10e3/uL Final     Absolute Immature Granulocytes 11/01/2021 0.0  <=0.0 10e3/uL Final     Absolute NRBCs 11/01/2021 0.0  10e3/uL Final

## 2022-02-18 ENCOUNTER — OFFICE VISIT (OUTPATIENT)
Dept: FAMILY MEDICINE | Facility: OTHER | Age: 22
End: 2022-02-18
Attending: PHYSICIAN ASSISTANT
Payer: COMMERCIAL

## 2022-02-18 VITALS
HEART RATE: 67 BPM | SYSTOLIC BLOOD PRESSURE: 112 MMHG | TEMPERATURE: 97.2 F | DIASTOLIC BLOOD PRESSURE: 60 MMHG | HEIGHT: 75 IN | WEIGHT: 204.8 LBS | OXYGEN SATURATION: 98 % | BODY MASS INDEX: 25.47 KG/M2

## 2022-02-18 DIAGNOSIS — Z91.018 MULTIPLE FOOD ALLERGIES: ICD-10-CM

## 2022-02-18 DIAGNOSIS — J45.40 UNCONTROLLED MODERATE PERSISTENT ASTHMA: Primary | ICD-10-CM

## 2022-02-18 PROCEDURE — 99214 OFFICE O/P EST MOD 30 MIN: CPT | Performed by: PHYSICIAN ASSISTANT

## 2022-02-18 RX ORDER — SUCRALFATE 1 G/1
1 TABLET ORAL 4 TIMES DAILY
Qty: 120 TABLET | Refills: 3 | Status: SHIPPED | OUTPATIENT
Start: 2022-02-18 | End: 2022-07-29

## 2022-02-18 RX ORDER — BUDESONIDE AND FORMOTEROL FUMARATE DIHYDRATE 160; 4.5 UG/1; UG/1
2 AEROSOL RESPIRATORY (INHALATION) 2 TIMES DAILY
Qty: 30.6 G | Refills: 3 | Status: SHIPPED | OUTPATIENT
Start: 2022-02-18 | End: 2022-03-18 | Stop reason: ALTCHOICE

## 2022-02-18 RX ORDER — CHLORAL HYDRATE 500 MG
2 CAPSULE ORAL DAILY
COMMUNITY

## 2022-02-18 RX ORDER — ALBUTEROL SULFATE 90 UG/1
2 AEROSOL, METERED RESPIRATORY (INHALATION) EVERY 6 HOURS
Qty: 36 G | Refills: 4 | Status: SHIPPED | OUTPATIENT
Start: 2022-02-18 | End: 2022-07-29

## 2022-02-18 RX ORDER — PREDNISONE 20 MG/1
TABLET ORAL
Qty: 20 TABLET | Refills: 0 | Status: SHIPPED | OUTPATIENT
Start: 2022-02-18 | End: 2022-03-18

## 2022-02-18 RX ORDER — GUAIFENESIN 600 MG/1
1200 TABLET, EXTENDED RELEASE ORAL 2 TIMES DAILY
Qty: 60 TABLET | Refills: 3 | Status: SHIPPED | OUTPATIENT
Start: 2022-02-18 | End: 2022-07-29

## 2022-02-18 ASSESSMENT — ASTHMA QUESTIONNAIRES
QUESTION_3 LAST FOUR WEEKS HOW OFTEN DID YOUR ASTHMA SYMPTOMS (WHEEZING, COUGHING, SHORTNESS OF BREATH, CHEST TIGHTNESS OR PAIN) WAKE YOU UP AT NIGHT OR EARLIER THAN USUAL IN THE MORNING: FOUR OR MORE NIGHTS A WEEK
QUESTION_4 LAST FOUR WEEKS HOW OFTEN HAVE YOU USED YOUR RESCUE INHALER OR NEBULIZER MEDICATION (SUCH AS ALBUTEROL): ONE OR TWO TIMES PER DAY
ACT_TOTALSCORE: 9
QUESTION_2 LAST FOUR WEEKS HOW OFTEN HAVE YOU HAD SHORTNESS OF BREATH: MORE THAN ONCE A DAY
QUESTION_5 LAST FOUR WEEKS HOW WOULD YOU RATE YOUR ASTHMA CONTROL: POORLY CONTROLLED
QUESTION_1 LAST FOUR WEEKS HOW MUCH OF THE TIME DID YOUR ASTHMA KEEP YOU FROM GETTING AS MUCH DONE AT WORK, SCHOOL OR AT HOME: SOME OF THE TIME
ACT_TOTALSCORE: 9

## 2022-02-18 ASSESSMENT — PAIN SCALES - GENERAL: PAINLEVEL: NO PAIN (0)

## 2022-02-18 ASSESSMENT — ANXIETY QUESTIONNAIRES
3. WORRYING TOO MUCH ABOUT DIFFERENT THINGS: NOT AT ALL
GAD7 TOTAL SCORE: 0
1. FEELING NERVOUS, ANXIOUS, OR ON EDGE: NOT AT ALL
7. FEELING AFRAID AS IF SOMETHING AWFUL MIGHT HAPPEN: NOT AT ALL
5. BEING SO RESTLESS THAT IT IS HARD TO SIT STILL: NOT AT ALL
4. TROUBLE RELAXING: NOT AT ALL
6. BECOMING EASILY ANNOYED OR IRRITABLE: NOT AT ALL
2. NOT BEING ABLE TO STOP OR CONTROL WORRYING: NOT AT ALL

## 2022-02-18 ASSESSMENT — PATIENT HEALTH QUESTIONNAIRE - PHQ9: SUM OF ALL RESPONSES TO PHQ QUESTIONS 1-9: 0

## 2022-02-18 NOTE — NURSING NOTE
"Chief Complaint   Patient presents with     Asthma       Initial /60 (BP Location: Right arm, Patient Position: Sitting, Cuff Size: Adult Regular)   Pulse 67   Temp 97.2  F (36.2  C) (Tympanic)   Ht 1.905 m (6' 3\")   Wt 92.9 kg (204 lb 12.8 oz)   SpO2 98%   BMI 25.60 kg/m   Estimated body mass index is 25.6 kg/m  as calculated from the following:    Height as of this encounter: 1.905 m (6' 3\").    Weight as of this encounter: 92.9 kg (204 lb 12.8 oz).  Medication Reconciliation: complete  Zenaida Munoz LPN  "

## 2022-02-19 ASSESSMENT — ANXIETY QUESTIONNAIRES: GAD7 TOTAL SCORE: 0

## 2022-02-22 ENCOUNTER — TELEPHONE (OUTPATIENT)
Dept: FAMILY MEDICINE | Facility: OTHER | Age: 22
End: 2022-02-22
Payer: COMMERCIAL

## 2022-02-22 NOTE — TELEPHONE ENCOUNTER
"[facility] calling in regards to albuterol inhaler. Pharmacist states that sig states: Inhale 2 puffs into the lungs every 6 hours. Pharmacist would like to add or as needed on directions. Verbal consent given to pharmacist to add \"or as needed\" on sig.   "

## 2022-02-28 ENCOUNTER — E-VISIT (OUTPATIENT)
Dept: URGENT CARE | Facility: CLINIC | Age: 22
End: 2022-02-28

## 2022-02-28 DIAGNOSIS — J01.90 ACUTE BACTERIAL SINUSITIS: Primary | ICD-10-CM

## 2022-02-28 DIAGNOSIS — B96.89 ACUTE BACTERIAL SINUSITIS: Primary | ICD-10-CM

## 2022-02-28 PROCEDURE — 99421 OL DIG E/M SVC 5-10 MIN: CPT | Performed by: FAMILY MEDICINE

## 2022-02-28 NOTE — PATIENT INSTRUCTIONS
Dear Hema Rico    After reviewing your responses, I've been able to diagnose you with?a sinus infection caused by bacteria.?     Based on your responses and diagnosis, I have prescribed Augmentin to treat your symptoms. I have sent this to your pharmacy.?     It is also important to stay well hydrated, get lots of rest and take over-the-counter decongestants,?tylenol?or ibuprofen if you?are able to?take those medications per your primary care provider to help relieve discomfort.?     It is important that you take?all of?your prescribed medication even if your symptoms are improving after a few doses.? Taking?all of?your medicine helps prevent the symptoms from returning.?     If your symptoms worsen, you develop severe headache, vomiting, high fever (>102), or are not improving in 7 days, please contact your primary care provider for an appointment or visit any of our convenient Walk-in Care or Urgent Care Centers to be seen which can be found on our website?here.?     Thanks again for choosing?us?as your health care partner,?   ?  Beata Fleming MD?

## 2022-03-16 ENCOUNTER — TRANSFERRED RECORDS (OUTPATIENT)
Dept: HEALTH INFORMATION MANAGEMENT | Facility: CLINIC | Age: 22
End: 2022-03-16

## 2022-03-18 ENCOUNTER — OFFICE VISIT (OUTPATIENT)
Dept: FAMILY MEDICINE | Facility: OTHER | Age: 22
End: 2022-03-18
Attending: PHYSICIAN ASSISTANT
Payer: COMMERCIAL

## 2022-03-18 VITALS
TEMPERATURE: 98.4 F | BODY MASS INDEX: 25.36 KG/M2 | DIASTOLIC BLOOD PRESSURE: 60 MMHG | HEIGHT: 75 IN | SYSTOLIC BLOOD PRESSURE: 110 MMHG | HEART RATE: 65 BPM | OXYGEN SATURATION: 98 % | WEIGHT: 204 LBS

## 2022-03-18 DIAGNOSIS — J45.40 MODERATE PERSISTENT ASTHMA, UNSPECIFIED WHETHER COMPLICATED: ICD-10-CM

## 2022-03-18 DIAGNOSIS — Z91.018 MULTIPLE FOOD ALLERGIES: Primary | ICD-10-CM

## 2022-03-18 PROCEDURE — 99213 OFFICE O/P EST LOW 20 MIN: CPT | Performed by: PHYSICIAN ASSISTANT

## 2022-03-18 RX ORDER — EPINEPHRINE 0.3 MG/.3ML
0.3 INJECTION SUBCUTANEOUS
COMMUNITY
Start: 2022-03-16

## 2022-03-18 ASSESSMENT — ANXIETY QUESTIONNAIRES
GAD7 TOTAL SCORE: 0
3. WORRYING TOO MUCH ABOUT DIFFERENT THINGS: NOT AT ALL
1. FEELING NERVOUS, ANXIOUS, OR ON EDGE: NOT AT ALL
7. FEELING AFRAID AS IF SOMETHING AWFUL MIGHT HAPPEN: NOT AT ALL
5. BEING SO RESTLESS THAT IT IS HARD TO SIT STILL: NOT AT ALL
4. TROUBLE RELAXING: NOT AT ALL
2. NOT BEING ABLE TO STOP OR CONTROL WORRYING: NOT AT ALL
6. BECOMING EASILY ANNOYED OR IRRITABLE: NOT AT ALL

## 2022-03-18 ASSESSMENT — ASTHMA QUESTIONNAIRES
ACT_TOTALSCORE: 20
QUESTION_1 LAST FOUR WEEKS HOW MUCH OF THE TIME DID YOUR ASTHMA KEEP YOU FROM GETTING AS MUCH DONE AT WORK, SCHOOL OR AT HOME: NONE OF THE TIME
QUESTION_3 LAST FOUR WEEKS HOW OFTEN DID YOUR ASTHMA SYMPTOMS (WHEEZING, COUGHING, SHORTNESS OF BREATH, CHEST TIGHTNESS OR PAIN) WAKE YOU UP AT NIGHT OR EARLIER THAN USUAL IN THE MORNING: NOT AT ALL
QUESTION_2 LAST FOUR WEEKS HOW OFTEN HAVE YOU HAD SHORTNESS OF BREATH: ONCE A DAY
QUESTION_5 LAST FOUR WEEKS HOW WOULD YOU RATE YOUR ASTHMA CONTROL: SOMEWHAT CONTROLLED
ACT_TOTALSCORE: 20
QUESTION_4 LAST FOUR WEEKS HOW OFTEN HAVE YOU USED YOUR RESCUE INHALER OR NEBULIZER MEDICATION (SUCH AS ALBUTEROL): NOT AT ALL

## 2022-03-18 ASSESSMENT — PATIENT HEALTH QUESTIONNAIRE - PHQ9: SUM OF ALL RESPONSES TO PHQ QUESTIONS 1-9: 0

## 2022-03-18 ASSESSMENT — PAIN SCALES - GENERAL: PAINLEVEL: NO PAIN (0)

## 2022-03-18 NOTE — PROGRESS NOTES
"  Assessment & Plan     Uncontrolled moderate persistent asthma    - budesonide (PULMICORT FLEXHALER) 90 MCG/ACT inhaler; Inhale 2 puffs into the lungs 2 times daily    Multiple food allergies        Review of external notes as documented elsewhere in note  Ordering of each unique test  Prescription drug management  5 minutes spent on the date of the encounter doing chart review, patient visit and documentation            Return for Follow up.    FIDENCIO Platt  [facility] - NORA Garrido is a 22 year old who presents for the following health issues     HPI     Asthma Follow-Up    Was ACT completed today?    Yes    ACT Total Scores 3/18/2022   ACT TOTAL SCORE (Goal Greater than or Equal to 20) 20   In the past 12 months, how many times did you visit the emergency room for your asthma without being admitted to the hospital? 0   In the past 12 months, how many times were you hospitalized overnight because of your asthma? 0          How many days per week do you miss taking your asthma controller medication?  0    Please describe any recent triggers for your asthma: allergies, or when he goes to bed at night    Have you had any Emergency Room Visits, Urgent Care Visits, or Hospital Admissions since your last office visit?  No          Review of Systems   CONSTITUTIONAL: NEGATIVE for fever, chills, change in weight  INTEGUMENTARY/SKIN: NEGATIVE for worrisome rashes, moles or lesions  ENT/MOUTH: NEGATIVE for ear, mouth and throat problems recent sinus infection. breathing is much better. Congestion is better   RESP: NEGATIVE for significant cough or SOB  CV: NEGATIVE for chest pain, palpitations or peripheral edema  PSYCHIATRIC: NEGATIVE for changes in mood or affect      Objective    /60 (BP Location: Right arm, Patient Position: Sitting, Cuff Size: Adult Regular)   Pulse 65   Temp 98.4  F (36.9  C) (Tympanic)   Ht 1.905 m (6' 3\")   Wt 92.5 kg (204 lb)   SpO2 98%   BMI " 25.50 kg/m    Body mass index is 25.5 kg/m .  Physical Exam   GENERAL: healthy, alert and no distress  EYES: Eyes grossly normal to inspection, PERRL and conjunctivae and sclerae normal  HENT: ear canals and TM's normal, nose and mouth without ulcers or lesions  NECK: no adenopathy, no asymmetry, masses, or scars and thyroid normal to palpation  RESP: lungs clear to auscultation - no rales, rhonchi or wheezes  CV: regular rate and rhythm, normal S1 S2, no S3 or S4, no murmur, click or rub, no peripheral edema and peripheral pulses strong  ABDOMEN: soft, nontender, no hepatosplenomegaly, no masses and bowel sounds normal  MS: no gross musculoskeletal defects noted, no edema  SKIN: no suspicious lesions or rashes  NEURO: Normal strength and tone, mentation intact and speech normal  PSYCH: mentation appears normal, affect normal/bright    Office Visit on 11/01/2021   Component Date Value Ref Range Status     Sodium 11/01/2021 138  133 - 144 mmol/L Final     Potassium 11/01/2021 4.0  3.4 - 5.3 mmol/L Final     Chloride 11/01/2021 107  94 - 109 mmol/L Final     Carbon Dioxide (CO2) 11/01/2021 28  20 - 32 mmol/L Final     Anion Gap 11/01/2021 3  3 - 14 mmol/L Final     Urea Nitrogen 11/01/2021 18  7 - 30 mg/dL Final     Creatinine 11/01/2021 0.74  0.66 - 1.25 mg/dL Final     Calcium 11/01/2021 9.1  8.5 - 10.1 mg/dL Final     Glucose 11/01/2021 92  70 - 99 mg/dL Final     Alkaline Phosphatase 11/01/2021 82  40 - 150 U/L Final     AST 11/01/2021 26  0 - 45 U/L Final     ALT 11/01/2021 58  0 - 70 U/L Final     Protein Total 11/01/2021 7.9  6.8 - 8.8 g/dL Final     Albumin 11/01/2021 4.1  3.4 - 5.0 g/dL Final     Bilirubin Total 11/01/2021 0.7  0.2 - 1.3 mg/dL Final     GFR Estimate 11/01/2021 >90  >60 mL/min/1.73m2 Final    As of July 11, 2021, eGFR is calculated by the CKD-EPI creatinine equation, without race adjustment. eGFR can be influenced by muscle mass, exercise, and diet. The reported eGFR is an estimation only and  is only applicable if the renal function is stable.     TSH 11/01/2021 1.94  0.40 - 4.00 mU/L Final     WBC Count 11/01/2021 5.3  4.0 - 11.0 10e3/uL Final     RBC Count 11/01/2021 4.92  4.40 - 5.90 10e6/uL Final     Hemoglobin 11/01/2021 15.0  13.3 - 17.7 g/dL Final     Hematocrit 11/01/2021 45.4  40.0 - 53.0 % Final     MCV 11/01/2021 92  78 - 100 fL Final     MCH 11/01/2021 30.5  26.5 - 33.0 pg Final     MCHC 11/01/2021 33.0  31.5 - 36.5 g/dL Final     RDW 11/01/2021 12.4  10.0 - 15.0 % Final     Platelet Count 11/01/2021 220  150 - 450 10e3/uL Final     % Neutrophils 11/01/2021 61  % Final     % Lymphocytes 11/01/2021 28  % Final     % Monocytes 11/01/2021 7  % Final     % Eosinophils 11/01/2021 2  % Final     % Basophils 11/01/2021 1  % Final     % Immature Granulocytes 11/01/2021 1  % Final     NRBCs per 100 WBC 11/01/2021 0  <1 /100 Final     Absolute Neutrophils 11/01/2021 3.3  1.6 - 8.3 10e3/uL Final     Absolute Lymphocytes 11/01/2021 1.5  0.8 - 5.3 10e3/uL Final     Absolute Monocytes 11/01/2021 0.4  0.0 - 1.3 10e3/uL Final     Absolute Eosinophils 11/01/2021 0.1  0.0 - 0.7 10e3/uL Final     Absolute Basophils 11/01/2021 0.0  0.0 - 0.2 10e3/uL Final     Absolute Immature Granulocytes 11/01/2021 0.0  <=0.0 10e3/uL Final     Absolute NRBCs 11/01/2021 0.0  10e3/uL Final

## 2022-03-18 NOTE — NURSING NOTE
"Chief Complaint   Patient presents with     Recheck Medication       Initial /60 (BP Location: Right arm, Patient Position: Sitting, Cuff Size: Adult Regular)   Pulse 65   Temp 98.4  F (36.9  C) (Tympanic)   Ht 1.905 m (6' 3\")   Wt 92.5 kg (204 lb)   SpO2 98%   BMI 25.50 kg/m   Estimated body mass index is 25.5 kg/m  as calculated from the following:    Height as of this encounter: 1.905 m (6' 3\").    Weight as of this encounter: 92.5 kg (204 lb).  Medication Reconciliation: complete  Zenaida Munoz LPN  "

## 2022-03-19 ASSESSMENT — ANXIETY QUESTIONNAIRES: GAD7 TOTAL SCORE: 0

## 2022-03-20 ENCOUNTER — TELEPHONE (OUTPATIENT)
Dept: FAMILY MEDICINE | Facility: OTHER | Age: 22
End: 2022-03-20

## 2022-03-23 NOTE — TELEPHONE ENCOUNTER
Prior Authorization Approval    Authorization Effective Date: 2/1/2022  Authorization Expiration Date: 3/23/2023  Medication: budesonide (PULMICORT FLEXHALER) 90 MCG/ACT inhaler  Approved Dose/Quantity: 1  Reference #:     Insurance Company: [facility] - Phone [phone] Fax [phone]  Expected CoPay:       CoPay Card Available:      Foundation Assistance Needed:    Which Pharmacy is filling the prescription (Not needed for infusion/clinic administered): [facility] [id number] [facility] [id number] [facility] [address]  Pharmacy Notified: Yes  Patient Notified: Comment:  Pharmacy will notify patient when prescription is ready for .

## 2022-03-23 NOTE — TELEPHONE ENCOUNTER
PA Initiation    Medication: budesonide (PULMICORT FLEXHALER) 90 MCG/ACT inhaler  Insurance Company: [facility] - Phone [phone] Fax [phone]  Pharmacy Filling the Rx: [facility] [address] [place] [id number] [facility] [address]  Filling Pharmacy Phone: [phone]  Filling Pharmacy Fax:    Start Date: 3/23/2022    [place] PRIOR  AUTHORIZATION TEAM  Ph:[phone]

## 2022-07-11 ENCOUNTER — NURSE TRIAGE (OUTPATIENT)
Dept: FAMILY MEDICINE | Facility: OTHER | Age: 22
End: 2022-07-11

## 2022-07-11 DIAGNOSIS — I47.10 SVT (SUPRAVENTRICULAR TACHYCARDIA) (H): Primary | ICD-10-CM

## 2022-07-11 NOTE — TELEPHONE ENCOUNTER
"    Patient was taking Symbicort, then stopped because after a month of treatment he started feeling like he feel's today, insomnia and racing heart rate, he stopped it, but his asthma seemed to be getting worse after he stopped it so he started it again about three months ago.  States it is Symbicort but I don't see it on his med list or in his history.  States it is not the budesonide (Pulmicort flex inhaler).    Patient started having symptoms of racing heart last night so much that it woke him up.  Patient fell asleep 2 hours later and woke up again at 5:30 with the same symptoms.   Also had insomnia, headache and seems really foggy today.  Patient also took a Vit D at 4, Melatonin at 9 and Symbicort at 10.  Advised to not take the medication until provider was notified and he got a phone call letting him know what to do.  Advised to ER if symptoms worsen.            Reason for Disposition    Problems with anxiety or stress    Palpitations    Answer Assessment - Initial Assessment Questions  1. DESCRIPTION: \"Please describe your heart rate or heart beat that you are having\" (e.g., fast/slow, regular/irregular, skipped or extra beats, \"palpitations\")      Woke him up last night, his heart felt like it was racing  2. ONSET: \"When did it start?\" (Minutes, hours or days)       Last night  3. DURATION: \"How long does it last\" (e.g., seconds, minutes, hours)      Felt like it was beating fast for 2 hours and then dunked his head in cool water.  It kind of went away but still felt like papiltations  4. PATTERN \"Does it come and go, or has it been constant since it started?\"  \"Does it get worse with exertion?\"   \"Are you feeling it now?\"      Went away and then when he woke up at 5:30 it was racing again and he had diarrhea  5. TAP: \"Using your hand, can you tap out what you are feeling on a chair or table in front of you, so that I can hear?\" (Note: not all patients can do this)            6. HEART RATE: \"Can you tell " "me your heart rate?\" \"How many beats in 15 seconds?\"  (Note: not all patients can do this)        Unknown felt like 1000 beats a minute  7. RECURRENT SYMPTOM: \"Have you ever had this before?\" If so, ask: \"When was the last time?\" and \"What happened that time?\"       Yes with the same medication when he first started taking the med  8. CAUSE: \"What do you think is causing the palpitations?\"      The medication  9. CARDIAC HISTORY: \"Do you have any history of heart disease?\" (e.g., heart attack, angina, bypass surgery, angioplasty, arrhythmia)       no  10. OTHER SYMPTOMS: \"Do you have any other symptoms?\" (e.g., dizziness, chest pain, sweating, difficulty breathing)        no  11. PREGNANCY: \"Is there any chance you are pregnant?\" \"When was your last menstrual period?\"        no    Protocols used: HEART RATE AND HEARTBEAT [id number]      "

## 2022-07-15 RX ORDER — METOPROLOL SUCCINATE 25 MG/1
25 TABLET, EXTENDED RELEASE ORAL DAILY
Qty: 30 TABLET | Refills: 1 | Status: SHIPPED | OUTPATIENT
Start: 2022-07-15 | End: 2022-11-10 | Stop reason: ALTCHOICE

## 2022-07-15 NOTE — TELEPHONE ENCOUNTER
Call him back his zio pach in November shows SVT rate of 190.   Lets get him on low dose beta blocker.  Please call him let him know I sent this in and want to see him after being on it two weeks.

## 2022-07-18 RX ORDER — PREDNISONE 20 MG/1
TABLET ORAL
Qty: 20 TABLET | Refills: 0 | Status: SHIPPED | OUTPATIENT
Start: 2022-07-18 | End: 2022-07-29

## 2022-07-28 NOTE — PROGRESS NOTES
Assessment & Plan     Cough variant asthma  He has trouble breathing when he is staking Symbicort.  Chest tightens and gets rapid heart beat.  Very sensitive to Formoterol. We will try Serevent and then Pulmicort.  He has the Pulmicort at home. Can't exercise.   - VENTOLIN  (90 Base) MCG/ACT inhaler; INHALE 2 PUFFS BY MOUTH EVERY 4 HOURS AS NEEDED FOR SHORTNESS OF BREATH DYSPNEA  OR  WHEEZING  - salmeterol (SEREVENT) 50 MCG/DOSE inhaler; Inhale 1 puff into the lungs 2 times daily    Review of external notes as documented elsewhere in note  Ordering of each unique test  Prescription drug management  5 minutes spent on the date of the encounter doing chart review, history and exam, documentation and further activities per the note       There are no Patient Instructions on file for this visit.    No follow-ups on file.    FIDENCIO Platt  [facility] - NORA Garrido is a 22 year old, presenting for the following health issues:  No chief complaint on file.      HPI     Asthma Follow-Up    Was ACT completed today?    Yes    ACT Total Scores 7/29/2022   ACT TOTAL SCORE (Goal Greater than or Equal to 20) 9   In the past 12 months, how many times did you visit the emergency room for your asthma without being admitted to the hospital? 0   In the past 12 months, how many times were you hospitalized overnight because of your asthma? 0       How many days per week do you miss taking your asthma controller medication?   The patient is no longer taking controller medication because of side effects     Please describe any recent triggers for your asthma: pollens, animal dander and humidity    Have you had any Emergency Room Visits, Urgent Care Visits, or Hospital Admissions since your last office visit?  No          Review of Systems   CONSTITUTIONAL:NEGATIVE for fever, chills, change in weight  INTEGUMENTARY/SKIN: NEGATIVE for worrisome rashes, moles or lesions  EYES: NEGATIVE for  vision changes or irritation  RESP:NEGATIVE for significant cough or SOB  CV: NEGATIVE for chest pain, palpitations or peripheral edema  PSYCHIATRIC: NEGATIVE for changes in mood or affect      Objective    /78   Pulse 69   Temp 97.4  F (36.3  C) (Tympanic)   Resp 20   Wt 94.3 kg (208 lb)   SpO2 99%   BMI 26.00 kg/m    Body mass index is 26 kg/m .  Physical Exam   GENERAL: healthy, alert and no distress  EYES: Eyes grossly normal to inspection, PERRL and conjunctivae and sclerae normal  HENT: ear canals and TM's normal, nose and mouth without ulcers or lesions  NECK: no adenopathy, no asymmetry, masses, or scars and thyroid normal to palpation  RESP: lungs clear to auscultation - no rales, rhonchi or wheezes  CV: regular rate and rhythm, normal S1 S2, no S3 or S4, no murmur, click or rub, no peripheral edema and peripheral pulses strong  ABDOMEN: soft, nontender, no hepatosplenomegaly, no masses and bowel sounds normal  MS: no gross musculoskeletal defects noted, no edema    Office Visit on 11/01/2021   Component Date Value Ref Range Status     Sodium 11/01/2021 138  133 - 144 mmol/L Final     Potassium 11/01/2021 4.0  3.4 - 5.3 mmol/L Final     Chloride 11/01/2021 107  94 - 109 mmol/L Final     Carbon Dioxide (CO2) 11/01/2021 28  20 - 32 mmol/L Final     Anion Gap 11/01/2021 3  3 - 14 mmol/L Final     Urea Nitrogen 11/01/2021 18  7 - 30 mg/dL Final     Creatinine 11/01/2021 0.74  0.66 - 1.25 mg/dL Final     Calcium 11/01/2021 9.1  8.5 - 10.1 mg/dL Final     Glucose 11/01/2021 92  70 - 99 mg/dL Final     Alkaline Phosphatase 11/01/2021 82  40 - 150 U/L Final     AST 11/01/2021 26  0 - 45 U/L Final     ALT 11/01/2021 58  0 - 70 U/L Final     Protein Total 11/01/2021 7.9  6.8 - 8.8 g/dL Final     Albumin 11/01/2021 4.1  3.4 - 5.0 g/dL Final     Bilirubin Total 11/01/2021 0.7  0.2 - 1.3 mg/dL Final     GFR Estimate 11/01/2021 >90  >60 mL/min/1.73m2 Final    As of July 11, 2021, eGFR is calculated by the  CKD-EPI creatinine equation, without race adjustment. eGFR can be influenced by muscle mass, exercise, and diet. The reported eGFR is an estimation only and is only applicable if the renal function is stable.     TSH 11/01/2021 1.94  0.40 - 4.00 mU/L Final     WBC Count 11/01/2021 5.3  4.0 - 11.0 10e3/uL Final     RBC Count 11/01/2021 4.92  4.40 - 5.90 10e6/uL Final     Hemoglobin 11/01/2021 15.0  13.3 - 17.7 g/dL Final     Hematocrit 11/01/2021 45.4  40.0 - 53.0 % Final     MCV 11/01/2021 92  78 - 100 fL Final     MCH 11/01/2021 30.5  26.5 - 33.0 pg Final     MCHC 11/01/2021 33.0  31.5 - 36.5 g/dL Final     RDW 11/01/2021 12.4  10.0 - 15.0 % Final     Platelet Count 11/01/2021 220  150 - 450 10e3/uL Final     % Neutrophils 11/01/2021 61  % Final     % Lymphocytes 11/01/2021 28  % Final     % Monocytes 11/01/2021 7  % Final     % Eosinophils 11/01/2021 2  % Final     % Basophils 11/01/2021 1  % Final     % Immature Granulocytes 11/01/2021 1  % Final     NRBCs per 100 WBC 11/01/2021 0  <1 /100 Final     Absolute Neutrophils 11/01/2021 3.3  1.6 - 8.3 10e3/uL Final     Absolute Lymphocytes 11/01/2021 1.5  0.8 - 5.3 10e3/uL Final     Absolute Monocytes 11/01/2021 0.4  0.0 - 1.3 10e3/uL Final     Absolute Eosinophils 11/01/2021 0.1  0.0 - 0.7 10e3/uL Final     Absolute Basophils 11/01/2021 0.0  0.0 - 0.2 10e3/uL Final     Absolute Immature Granulocytes 11/01/2021 0.0  <=0.0 10e3/uL Final     Absolute NRBCs 11/01/2021 0.0  10e3/uL Final                   .  ..

## 2022-07-29 ENCOUNTER — OFFICE VISIT (OUTPATIENT)
Dept: FAMILY MEDICINE | Facility: OTHER | Age: 22
End: 2022-07-29
Attending: PHYSICIAN ASSISTANT
Payer: COMMERCIAL

## 2022-07-29 VITALS
WEIGHT: 208 LBS | HEART RATE: 69 BPM | DIASTOLIC BLOOD PRESSURE: 78 MMHG | SYSTOLIC BLOOD PRESSURE: 130 MMHG | TEMPERATURE: 97.4 F | RESPIRATION RATE: 20 BRPM | BODY MASS INDEX: 26 KG/M2 | OXYGEN SATURATION: 99 %

## 2022-07-29 DIAGNOSIS — J45.991 COUGH VARIANT ASTHMA: ICD-10-CM

## 2022-07-29 PROCEDURE — 99213 OFFICE O/P EST LOW 20 MIN: CPT | Performed by: PHYSICIAN ASSISTANT

## 2022-07-29 RX ORDER — ALBUTEROL SULFATE 90 UG/1
AEROSOL, METERED RESPIRATORY (INHALATION)
Qty: 18 G | Refills: 1 | Status: SHIPPED | OUTPATIENT
Start: 2022-07-29

## 2022-07-29 ASSESSMENT — ASTHMA QUESTIONNAIRES
QUESTION_3 LAST FOUR WEEKS HOW OFTEN DID YOUR ASTHMA SYMPTOMS (WHEEZING, COUGHING, SHORTNESS OF BREATH, CHEST TIGHTNESS OR PAIN) WAKE YOU UP AT NIGHT OR EARLIER THAN USUAL IN THE MORNING: FOUR OR MORE NIGHTS A WEEK
QUESTION_1 LAST FOUR WEEKS HOW MUCH OF THE TIME DID YOUR ASTHMA KEEP YOU FROM GETTING AS MUCH DONE AT WORK, SCHOOL OR AT HOME: MOST OF THE TIME
QUESTION_2 LAST FOUR WEEKS HOW OFTEN HAVE YOU HAD SHORTNESS OF BREATH: ONCE A DAY
QUESTION_4 LAST FOUR WEEKS HOW OFTEN HAVE YOU USED YOUR RESCUE INHALER OR NEBULIZER MEDICATION (SUCH AS ALBUTEROL): ONE OR TWO TIMES PER DAY
QUESTION_5 LAST FOUR WEEKS HOW WOULD YOU RATE YOUR ASTHMA CONTROL: POORLY CONTROLLED
ACT_TOTALSCORE: 9
ACT_TOTALSCORE: 9

## 2022-07-29 ASSESSMENT — PAIN SCALES - GENERAL: PAINLEVEL: NO PAIN (0)

## 2022-09-04 ENCOUNTER — HEALTH MAINTENANCE LETTER (OUTPATIENT)
Age: 22
End: 2022-09-04

## 2022-10-25 ENCOUNTER — E-VISIT (OUTPATIENT)
Dept: URGENT CARE | Facility: CLINIC | Age: 22
End: 2022-10-25

## 2022-10-25 DIAGNOSIS — J45.40 MODERATE PERSISTENT ASTHMA WITHOUT COMPLICATION: ICD-10-CM

## 2022-10-25 DIAGNOSIS — R05.1 ACUTE COUGH: Primary | ICD-10-CM

## 2022-10-25 PROCEDURE — 99421 OL DIG E/M SVC 5-10 MIN: CPT | Performed by: EMERGENCY MEDICINE

## 2022-10-25 RX ORDER — AZITHROMYCIN 250 MG/1
TABLET, FILM COATED ORAL
Qty: 6 TABLET | Refills: 0 | Status: SHIPPED | OUTPATIENT
Start: 2022-10-25 | End: 2022-10-30

## 2022-10-25 RX ORDER — ALBUTEROL SULFATE 90 UG/1
2 AEROSOL, METERED RESPIRATORY (INHALATION) EVERY 4 HOURS PRN
Qty: 18 G | Refills: 1 | Status: SHIPPED | OUTPATIENT
Start: 2022-10-25 | End: 2022-12-21

## 2022-10-25 RX ORDER — FLUTICASONE FUROATE 100 UG/1
1 POWDER RESPIRATORY (INHALATION) DAILY
Qty: 30 EACH | Refills: 0 | Status: SHIPPED | OUTPATIENT
Start: 2022-10-25 | End: 2022-11-10

## 2022-10-25 NOTE — PATIENT INSTRUCTIONS
"    Dear Hema Rico    After reviewing your responses, I've been able to diagnose you with \"Bronchitis\" which is a common infection of your lungs. While this is most commonly caused by a virus, the symptoms you have given suggest you should be treated with antibiotics.     I have sent zpak, and inhalers to your pharmacy to treat this infection.     It is important that you take all of your prescribed medication even if your symptoms are improving after a few doses. Taking all of your medicine helps prevent the symptoms from returning.     If your symptoms worsen, you develop chest pain or shortness of breath, fevers over 101, or are not improving in 5 days, please contact your primary care provider for an appointment or visit any of our convenient Walk-in Care or Urgent Care Centers to be seen which can be found on our website here.    Thanks again for choosing us as your health care partner,    Joe Snyder MD    "

## 2022-11-10 DIAGNOSIS — J45.40 MODERATE PERSISTENT ASTHMA WITHOUT COMPLICATION: ICD-10-CM

## 2022-11-10 RX ORDER — FLUTICASONE FUROATE 100 UG/1
1 POWDER RESPIRATORY (INHALATION) 2 TIMES DAILY
Qty: 60 EACH | Refills: 4 | Status: SHIPPED | OUTPATIENT
Start: 2022-11-10 | End: 2022-12-16 | Stop reason: ALTCHOICE

## 2022-12-02 ENCOUNTER — E-VISIT (OUTPATIENT)
Dept: URGENT CARE | Facility: CLINIC | Age: 22
End: 2022-12-02

## 2022-12-02 ENCOUNTER — HOSPITAL ENCOUNTER (EMERGENCY)
Facility: HOSPITAL | Age: 22
Discharge: HOME | End: 2022-12-02
Attending: NURSE PRACTITIONER | Admitting: NURSE PRACTITIONER
Payer: COMMERCIAL

## 2022-12-02 ENCOUNTER — APPOINTMENT (OUTPATIENT)
Dept: GENERAL RADIOLOGY | Facility: HOSPITAL | Age: 22
End: 2022-12-02
Attending: NURSE PRACTITIONER
Payer: COMMERCIAL

## 2022-12-02 VITALS
DIASTOLIC BLOOD PRESSURE: 77 MMHG | HEART RATE: 80 BPM | OXYGEN SATURATION: 96 % | RESPIRATION RATE: 16 BRPM | SYSTOLIC BLOOD PRESSURE: 131 MMHG | TEMPERATURE: 98.9 F

## 2022-12-02 DIAGNOSIS — J45.901 ASTHMA EXACERBATION: ICD-10-CM

## 2022-12-02 DIAGNOSIS — U07.1 INFECTION DUE TO 2019 NOVEL CORONAVIRUS: Primary | ICD-10-CM

## 2022-12-02 DIAGNOSIS — J45.40 MODERATE PERSISTENT ASTHMA WITHOUT COMPLICATION: Primary | ICD-10-CM

## 2022-12-02 DIAGNOSIS — R06.02 SOB (SHORTNESS OF BREATH): ICD-10-CM

## 2022-12-02 DIAGNOSIS — U07.1 INFECTION DUE TO 2019 NOVEL CORONAVIRUS: ICD-10-CM

## 2022-12-02 DIAGNOSIS — R06.2 WHEEZING: ICD-10-CM

## 2022-12-02 PROBLEM — G44.219 EPISODIC TENSION-TYPE HEADACHE, NOT INTRACTABLE: Chronic | Status: RESOLVED | Noted: 2018-11-02 | Resolved: 2022-12-02

## 2022-12-02 PROBLEM — F41.1 GAD (GENERALIZED ANXIETY DISORDER): Chronic | Status: RESOLVED | Noted: 2019-03-06 | Resolved: 2022-12-02

## 2022-12-02 PROBLEM — F17.200 TOBACCO USE DISORDER: Status: RESOLVED | Noted: 2019-06-06 | Resolved: 2022-12-02

## 2022-12-02 PROBLEM — F51.04 PSYCHOPHYSIOLOGIC INSOMNIA: Status: RESOLVED | Noted: 2019-08-06 | Resolved: 2022-12-02

## 2022-12-02 PROCEDURE — 71045 X-RAY EXAM CHEST 1 VIEW: CPT

## 2022-12-02 PROCEDURE — 99207 PR NO CHARGE LOS: CPT | Performed by: NURSE PRACTITIONER

## 2022-12-02 PROCEDURE — G0463 HOSPITAL OUTPT CLINIC VISIT: HCPCS | Mod: 25

## 2022-12-02 PROCEDURE — 99213 OFFICE O/P EST LOW 20 MIN: CPT | Performed by: NURSE PRACTITIONER

## 2022-12-02 RX ORDER — PREDNISONE 20 MG/1
TABLET ORAL
Qty: 10 TABLET | Refills: 0 | Status: SHIPPED | OUTPATIENT
Start: 2022-12-02 | End: 2022-12-16

## 2022-12-02 ASSESSMENT — ENCOUNTER SYMPTOMS
HEADACHES: 0
SHORTNESS OF BREATH: 1
DIARRHEA: 0
PSYCHIATRIC NEGATIVE: 1
MYALGIAS: 0
FEVER: 0
SORE THROAT: 0
CHILLS: 0
NAUSEA: 0
RHINORRHEA: 0
VOMITING: 0
EYE REDNESS: 0
EYE DISCHARGE: 0
COUGH: 1

## 2022-12-02 ASSESSMENT — ACTIVITIES OF DAILY LIVING (ADL): ADLS_ACUITY_SCORE: 35

## 2022-12-02 NOTE — PATIENT INSTRUCTIONS
Dear Hema Rico,    We are sorry you are not feeling well. Based on the responses you provided, you may be experiencing a serious health condition that needs immediate in-person attention. It is recommended that you be seen in the emergency room in order to better evaluate your symptoms. Please click here to find the nearest Emergency Room.     ZOHRA Dueñas CNP

## 2022-12-02 NOTE — ED TRIAGE NOTES
Patient presents to urgent care with c/o having difficulty breathing. States he tested positive for COVID on Tuesday and is now having a very hard time breathing. States he had a e-visit today and they told him to come in a be seen. States it feels like asthma like symptoms and is just wanting some steroids or something to help with the symptoms and breathing.

## 2022-12-02 NOTE — ED TRIAGE NOTES
Reports to having lung tightness and a cough. Had e-visit and they told him to come in.    Tested positive for covid on Tuesday

## 2022-12-02 NOTE — ED PROVIDER NOTES
History     Chief Complaint   Patient presents with     Cough     HPI  Hema Rico is a 22 year old male who presents to urgent care today (ambulatory) with complaints of cough and SOB.  Patient currently COVID positive.  Symptoms started 9 days ago.  Has asthma and feels like he is starting to have an asthma exacerbation.  Uses Serevent BID and Fluticasone BID and Albuterol as needed, last albuterol use was 1400.  Nonsmoker.  No rashes.  Staying hydrated.  No other concerns.     Allergies:  Allergies   Allergen Reactions     Chicken-Derived Products (Egg) Itching     Sulfa Drugs      Unknown rxn. Rxn as infant     Symbicort Other (See Comments)     Rapid heart beat        Problem List:    Patient Active Problem List    Diagnosis Date Noted     Cough variant asthma 06/09/2020     Priority: Medium     Seasonal allergic rhinitis, unspecified trigger 06/09/2020     Priority: Medium        Past Medical History:    No past medical history on file.    Past Surgical History:    Past Surgical History:   Procedure Laterality Date     ENDOSCOPIC SINUS SURGERY Bilateral 8/26/2020    Procedure: BILATERAL ENDOSCOPIC SINUS SURGERY WITH PROPEL;  Surgeon: Lianne Mejía MD;  Location: [facility]      TOOTH EXTRACTION W/FORCEP       SEPTOPLASTY, TURBINOPLASTY, COMBINED Bilateral 8/26/2020    Procedure: SEPTOPLASTY, TURBINATE REDUCTION;  Surgeon: Lianne Mejía MD;  Location: [facility]       Family History:    Family History   Problem Relation Age of Onset     Asthma Mother      Anxiety Disorder Mother      Psychotic Disorder Father         panic attacks     Hypertension Father      Family History Negative Maternal Grandmother      Family History Negative Brother      Anxiety Disorder Brother         panic attack, drug abuse     Hypertension Maternal Grandfather      C.A.D. Maternal Grandfather      Family History Negative Paternal Grandmother      Family History Negative Paternal Grandfather      Gastrointestinal  Disease Maternal Aunt         crohns     Heart Disease Other         family history Afib     Heart Disease Other         family history mitral valve     Thyroid Disease No family hx of        Social History:  Marital Status:  Single [1]  Social History     Tobacco Use     Smoking status: Former     Types: Dip, chew, snus or snuff, Other     Quit date: 2021     Years since quittin.3     Smokeless tobacco: Former     Types: Chew     Quit date: 2020   Substance Use Topics     Alcohol use: Yes     Comment: occ     Drug use: No        Medications:    albuterol (PROAIR HFA/PROVENTIL HFA/VENTOLIN HFA) 108 (90 Base) MCG/ACT inhaler  Cholecalciferol (VITAMIN D-3) 125 MCG (5000 UT) TABS  EPINEPHrine (ANY BX GENERIC EQUIV) 0.3 MG/0.3ML injection 2-pack  fish oil-omega-3 fatty acids 1000 MG capsule  fluticasone (ARNUITY ELLIPTA) 100 MCG/ACT inhaler  loratadine (CLARITIN) 10 MG tablet  predniSONE (DELTASONE) 20 MG tablet  salmeterol (SEREVENT) 50 MCG/DOSE inhaler  VENTOLIN  (90 Base) MCG/ACT inhaler      Review of Systems   Constitutional: Negative for chills and fever.   HENT: Negative for congestion, ear pain, rhinorrhea and sore throat.    Eyes: Negative for discharge and redness.   Respiratory: Positive for cough and shortness of breath.    Cardiovascular: Negative for chest pain.   Gastrointestinal: Negative for diarrhea, nausea and vomiting.   Genitourinary: Negative for decreased urine volume.   Musculoskeletal: Negative for myalgias.   Skin: Negative for rash.   Neurological: Negative for headaches.   Psychiatric/Behavioral: Negative.      Physical Exam   BP: 131/77  Pulse: 80  Temp: 98.9  F (37.2  C)  Resp: 16  SpO2: 96 %    Physical Exam  Vitals and nursing note reviewed.   Constitutional:       General: He is not in acute distress.     Appearance: Normal appearance. He is not ill-appearing or toxic-appearing.   HENT:      Head: Normocephalic.      Right Ear: Tympanic membrane, ear canal and  external ear normal.      Left Ear: Tympanic membrane, ear canal and external ear normal.      Nose: Nose normal.      Mouth/Throat:      Mouth: Mucous membranes are moist.      Pharynx: Oropharynx is clear.   Cardiovascular:      Rate and Rhythm: Normal rate and regular rhythm.      Pulses: Normal pulses.      Heart sounds: Normal heart sounds.   Pulmonary:      Effort: Pulmonary effort is normal.      Breath sounds: Normal breath sounds.   Abdominal:      General: Bowel sounds are normal.      Palpations: Abdomen is soft.      Tenderness: There is no abdominal tenderness.   Musculoskeletal:      Cervical back: Normal range of motion and neck supple.   Neurological:      Mental Status: He is alert.   Psychiatric:         Mood and Affect: Mood normal.       ED Course     Results for orders placed or performed during the hospital encounter of 12/02/22 (from the past 24 hour(s))   XR Chest Port 1 View    Narrative    PROCEDURE:  XR CHEST PORT 1 VIEW    HISTORY:  Cough/SOB.     COMPARISON:  2020    FINDINGS:   The cardiac silhouette is normal in size. The pulmonary vasculature is  normal.  The lungs are clear. No pleural effusion or pneumothorax.      Impression    IMPRESSION:  No acute cardiopulmonary disease.      CESAR ESTRADA MD         SYSTEM ID:  [id number]       Medications - No data to display    Assessments & Plan (with Medical Decision Making)     I have reviewed the nursing notes.    I have reviewed the findings, diagnosis, plan and need for follow up with the patient.  (U07.1) Infection due to 2019 novel coronavirus  (primary encounter diagnosis)  (J45.901) Asthma exacerbation  Plan:   Patient currently COVID-positive and on day 9.  Patient ambulatory with a nontoxic appearance.  Denies any fever, chills, nausea, vomiting, diarrhea or chest pain.  Staying hydrated.  No rashes.  Lungs clear throughout.  Portable chest x-ray completed and impression shows no acute cardiopulmonary disease.  Cough and  shortness of breath consistent with asthma exacerbation.  Patient take prednisone as ordered.  Symptomatic treatment recommendations provided.  Alternate tylenol and ibuprofen as needed for pain or fever.  Follow-up with primary care provider or return to urgent care/ED with any worsening in condition or additional concerns.  Patient in agreement with treatment plan.    New Prescriptions    PREDNISONE (DELTASONE) 20 MG TABLET    Take two tablets (= 40mg) each day for 5 (five) days     Final diagnoses:   Infection due to 2019 novel coronavirus   Asthma exacerbation     12/2/2022   [facility]     Thelma Juárez NP  12/02/22 [id number]

## 2022-12-07 DIAGNOSIS — J45.991 COUGH VARIANT ASTHMA: ICD-10-CM

## 2022-12-08 RX ORDER — SALMETEROL XINAFOATE 50 MCG
BLISTER, WITH INHALATION DEVICE INHALATION
Qty: 60 EACH | Refills: 1 | Status: SHIPPED | OUTPATIENT
Start: 2022-12-08 | End: 2022-12-16

## 2022-12-13 ENCOUNTER — MYC MEDICAL ADVICE (OUTPATIENT)
Dept: FAMILY MEDICINE | Facility: OTHER | Age: 22
End: 2022-12-13

## 2022-12-13 DIAGNOSIS — J45.41 MODERATE PERSISTENT ASTHMA WITH EXACERBATION: Primary | ICD-10-CM

## 2022-12-14 RX ORDER — PREDNISONE 20 MG/1
TABLET ORAL
Qty: 20 TABLET | Refills: 0 | Status: SHIPPED | OUTPATIENT
Start: 2022-12-14 | End: 2023-04-12

## 2022-12-16 ENCOUNTER — OFFICE VISIT (OUTPATIENT)
Dept: FAMILY MEDICINE | Facility: OTHER | Age: 22
End: 2022-12-16
Attending: PHYSICIAN ASSISTANT
Payer: COMMERCIAL

## 2022-12-16 ENCOUNTER — ANCILLARY PROCEDURE (OUTPATIENT)
Dept: GENERAL RADIOLOGY | Facility: OTHER | Age: 22
End: 2022-12-16
Attending: PHYSICIAN ASSISTANT
Payer: COMMERCIAL

## 2022-12-16 VITALS
DIASTOLIC BLOOD PRESSURE: 74 MMHG | TEMPERATURE: 98.5 F | HEIGHT: 75 IN | HEART RATE: 103 BPM | BODY MASS INDEX: 25.86 KG/M2 | WEIGHT: 208 LBS | OXYGEN SATURATION: 97 % | SYSTOLIC BLOOD PRESSURE: 128 MMHG

## 2022-12-16 DIAGNOSIS — J45.41 MODERATE PERSISTENT ASTHMA WITH EXACERBATION: ICD-10-CM

## 2022-12-16 DIAGNOSIS — J45.41 MODERATE PERSISTENT ASTHMA WITH EXACERBATION: Primary | ICD-10-CM

## 2022-12-16 PROCEDURE — 71046 X-RAY EXAM CHEST 2 VIEWS: CPT | Mod: TC | Performed by: RADIOLOGY

## 2022-12-16 PROCEDURE — 99213 OFFICE O/P EST LOW 20 MIN: CPT | Performed by: PHYSICIAN ASSISTANT

## 2022-12-16 ASSESSMENT — PAIN SCALES - GENERAL: PAINLEVEL: NO PAIN (0)

## 2022-12-16 ASSESSMENT — ASTHMA QUESTIONNAIRES
QUESTION_3 LAST FOUR WEEKS HOW OFTEN DID YOUR ASTHMA SYMPTOMS (WHEEZING, COUGHING, SHORTNESS OF BREATH, CHEST TIGHTNESS OR PAIN) WAKE YOU UP AT NIGHT OR EARLIER THAN USUAL IN THE MORNING: FOUR OR MORE NIGHTS A WEEK
QUESTION_5 LAST FOUR WEEKS HOW WOULD YOU RATE YOUR ASTHMA CONTROL: POORLY CONTROLLED
QUESTION_4 LAST FOUR WEEKS HOW OFTEN HAVE YOU USED YOUR RESCUE INHALER OR NEBULIZER MEDICATION (SUCH AS ALBUTEROL): THREE OR MORE TIMES PER DAY
ACT_TOTALSCORE: 9
QUESTION_1 LAST FOUR WEEKS HOW MUCH OF THE TIME DID YOUR ASTHMA KEEP YOU FROM GETTING AS MUCH DONE AT WORK, SCHOOL OR AT HOME: A LITTLE OF THE TIME
QUESTION_2 LAST FOUR WEEKS HOW OFTEN HAVE YOU HAD SHORTNESS OF BREATH: MORE THAN ONCE A DAY
ACT_TOTALSCORE: 9
EMERGENCY_ROOM_LAST_YEAR_TOTAL: ONE

## 2022-12-16 NOTE — PROGRESS NOTES
"  Assessment & Plan     Moderate persistent asthma with exacerbation  No sign of covid disease.  Changed medications on phone 2 days ago.  Will recheck in 3 weeks. Has 2 weeks of prednisone again, for which I told him certainly can be covid related but concerned a bout him getting influenza. Will watch for fever and chills. If we can't get on top of asthma we will be happy to get him to pulmonary. Change to Trelligy.   - XR CHEST 2 VW (Clinic Performed); Future    Review of external notes as documented elsewhere in note  Ordering of each unique test  Prescription drug management  10 minutes spent on the date of the encounter doing chart review, history and exam, documentation and further activities per the note       BMI:   Estimated body mass index is 26 kg/m  as calculated from the following:    Height as of this encounter: 1.905 m (6' 3\").    Weight as of this encounter: 94.3 kg (208 lb).       See Patient Instructions    No follow-ups on file.    FIDENCIO Platt  [facility] - [place] Garrido is a 22 year old, presenting for the following health issues:  Asthma      HPI     Asthma Follow-Up    Was ACT completed today?    Yes    ACT Total Scores 12/16/2022   ACT TOTAL SCORE (Goal Greater than or Equal to 20) 9   In the past 12 months, how many times did you visit the emergency room for your asthma without being admitted to the hospital? 1   In the past 12 months, how many times were you hospitalized overnight because of your asthma? 0         How many days per week do you miss taking your asthma controller medication?  0    Please describe any recent triggers for your asthma: allergy base    Have you had any Emergency Room Visits, Urgent Care Visits, or Hospital Admissions since your last office visit?  Yes 1x last month          Review of Systems   Constitutional, HEENT, cardiovascular, pulmonary, gi and gu systems are negative, except as otherwise noted.      Objective    BP " "128/74 (BP Location: Right arm, Patient Position: Sitting, Cuff Size: Adult Regular)   Pulse 103   Temp 98.5  F (36.9  C) (Tympanic)   Ht 1.905 m (6' 3\")   Wt 94.3 kg (208 lb)   SpO2 97%   BMI 26.00 kg/m    Body mass index is 26 kg/m .  Physical Exam   GENERAL: healthy, alert and no distress  EYES: Eyes grossly normal to inspection, PERRL and conjunctivae and sclerae normal  HENT: ear canals and TM's normal, nose and mouth without ulcers or lesions  NECK: no adenopathy, no asymmetry, masses, or scars and thyroid normal to palpation  RESP: lungs clear to auscultation - no rales, rhonchi or wheezes  CV: regular rate and rhythm, normal S1 S2, no S3 or S4, no murmur, click or rub, no peripheral edema and peripheral pulses strong  MS: no gross musculoskeletal defects noted, no edema  SKIN: no suspicious lesions or rashes  NEURO: Normal strength and tone, mentation intact and speech normal  LYMPH: no cervical, supraclavicular, axillary, or inguinal adenopathy    Results for orders placed or performed in visit on 12/16/22   XR CHEST 2 VW (Clinic Performed)     Status: None    Narrative    PROCEDURE:  XR CHEST 2 VIEWS    HISTORY: Moderate persistent asthma with exacerbation, .    COMPARISON:  12/2/2022    FINDINGS:  The cardiomediastinal contours are normal. The trachea is midline.  No focal consolidation, effusion or pneumothorax.    No suspicious osseous lesion or subdiaphragmatic free air.      Impression    IMPRESSION:      No focal consolidation.      NGOC KESSLER MD         SYSTEM ID:  [id number]                   "

## 2022-12-19 ENCOUNTER — DOCUMENTATION ONLY (OUTPATIENT)
Dept: OTHER | Facility: CLINIC | Age: 22
End: 2022-12-19

## 2022-12-21 DIAGNOSIS — J45.40 MODERATE PERSISTENT ASTHMA WITHOUT COMPLICATION: ICD-10-CM

## 2022-12-21 RX ORDER — ALBUTEROL SULFATE 90 UG/1
2 AEROSOL, METERED RESPIRATORY (INHALATION) EVERY 4 HOURS PRN
Qty: 18 G | Refills: 1 | Status: SHIPPED | OUTPATIENT
Start: 2022-12-21

## 2022-12-21 NOTE — TELEPHONE ENCOUNTER
"Requested Prescriptions   Pending Prescriptions Disp Refills    albuterol (PROAIR HFA/PROVENTIL HFA/VENTOLIN HFA) 108 (90 Base) MCG/ACT inhaler 18 g 1     Sig: Inhale 2 puffs into the lungs every 4 hours as needed for shortness of breath or wheezing       Asthma Maintenance Inhalers - Anticholinergics Failed - 12/21/2022  9:52 AM        Failed - Asthma control assessment score within normal limits in last 6 months     Please review ACT score.           Passed - Patient is age 12 years or older        Passed - Medication is active on med list        Passed - Recent (6 mo) or future (30 days) visit within the authorizing provider's specialty     Patient had office visit in the last 6 months or has a visit in the next 30 days with authorizing provider or within the authorizing provider's specialty.  See \"Patient Info\" tab in inbasket, or \"Choose Columns\" in Meds & Orders section of the refill encounter.           Short-Acting Beta Agonist Inhalers Protocol  Failed - 12/21/2022  9:52 AM        Failed - Asthma control assessment score within normal limits in last 6 months     Please review ACT score.           Passed - Patient is age 12 or older        Passed - Medication is active on med list        Passed - Recent (6 mo) or future (30 days) visit within the authorizing provider's specialty     Patient had office visit in the last 6 months or has a visit in the next 30 days with authorizing provider or within the authorizing provider's specialty.  See \"Patient Info\" tab in inbasket, or \"Choose Columns\" in Meds & Orders section of the refill encounter.                 "

## 2023-02-04 NOTE — TELEPHONE ENCOUNTER
serevent      Last Written Prescription Date:  7/29/22  Last Fill Quantity: 60,   # refills: 3  Last Office Visit: 7/19/22  Future Office visit:       
88

## 2023-03-13 ENCOUNTER — E-VISIT (OUTPATIENT)
Dept: URGENT CARE | Facility: CLINIC | Age: 23
End: 2023-03-13

## 2023-03-13 DIAGNOSIS — R06.2 WHEEZING: Primary | ICD-10-CM

## 2023-03-13 PROCEDURE — 99207 PR NON-BILLABLE SERV PER CHARTING: CPT | Performed by: NURSE PRACTITIONER

## 2023-03-13 NOTE — PATIENT INSTRUCTIONS
Dear Hema Rico,    We are sorry you are not feeling well. Based on the responses you provided, it is recommended that you be seen in-person in urgent care so we can better evaluate your symptoms. Please click here to find the nearest urgent care location to you.   You will not be charged for this Visit. Thank you for trusting us with your care.    ZOHRA Quiroz CNP

## 2023-03-15 ENCOUNTER — MYC MEDICAL ADVICE (OUTPATIENT)
Dept: FAMILY MEDICINE | Facility: OTHER | Age: 23
End: 2023-03-15

## 2023-03-15 ASSESSMENT — ASTHMA QUESTIONNAIRES
QUESTION_2 LAST FOUR WEEKS HOW OFTEN HAVE YOU HAD SHORTNESS OF BREATH: ONCE OR TWICE A WEEK
QUESTION_4 LAST FOUR WEEKS HOW OFTEN HAVE YOU USED YOUR RESCUE INHALER OR NEBULIZER MEDICATION (SUCH AS ALBUTEROL): ONCE A WEEK OR LESS
QUESTION_5 LAST FOUR WEEKS HOW WOULD YOU RATE YOUR ASTHMA CONTROL: WELL CONTROLLED
QUESTION_3 LAST FOUR WEEKS HOW OFTEN DID YOUR ASTHMA SYMPTOMS (WHEEZING, COUGHING, SHORTNESS OF BREATH, CHEST TIGHTNESS OR PAIN) WAKE YOU UP AT NIGHT OR EARLIER THAN USUAL IN THE MORNING: ONCE OR TWICE
ACT_TOTALSCORE: 20
QUESTION_1 LAST FOUR WEEKS HOW MUCH OF THE TIME DID YOUR ASTHMA KEEP YOU FROM GETTING AS MUCH DONE AT WORK, SCHOOL OR AT HOME: A LITTLE OF THE TIME
EMERGENCY_ROOM_LAST_YEAR_TOTAL: ONE
ACT_TOTALSCORE: 20

## 2023-03-15 NOTE — TELEPHONE ENCOUNTER
Patient completed MyChart ACT per [facility] quality measure patient outreach. This writer notes no concerns at present with patient currently in [facility] Control Range >/=20.     ACT Total Scores 7/29/2022 12/16/2022 3/15/2023   ACT TOTAL SCORE (Goal Greater than or Equal to 20) 9 9 20   In the past 12 months, how many times did you visit the emergency room for your asthma without being admitted to the hospital? 0 1 1   In the past 12 months, how many times were you hospitalized overnight because of your asthma? 0 0 0     Mariza Pulido RN

## 2023-04-12 ENCOUNTER — OFFICE VISIT (OUTPATIENT)
Dept: FAMILY MEDICINE | Facility: OTHER | Age: 23
End: 2023-04-12
Attending: FAMILY MEDICINE
Payer: COMMERCIAL

## 2023-04-12 VITALS
HEART RATE: 77 BPM | OXYGEN SATURATION: 97 % | RESPIRATION RATE: 18 BRPM | TEMPERATURE: 98.8 F | SYSTOLIC BLOOD PRESSURE: 112 MMHG | HEIGHT: 75 IN | DIASTOLIC BLOOD PRESSURE: 68 MMHG | WEIGHT: 233.3 LBS | BODY MASS INDEX: 29.01 KG/M2

## 2023-04-12 DIAGNOSIS — R05.9 COUGH, UNSPECIFIED TYPE: ICD-10-CM

## 2023-04-12 DIAGNOSIS — R06.00 DYSPNEA, UNSPECIFIED TYPE: Primary | ICD-10-CM

## 2023-04-12 PROCEDURE — 99213 OFFICE O/P EST LOW 20 MIN: CPT | Performed by: FAMILY MEDICINE

## 2023-04-12 ASSESSMENT — PAIN SCALES - GENERAL: PAINLEVEL: MILD PAIN (3)

## 2023-04-12 NOTE — PROGRESS NOTES
Assessment & Plan     Dyspnea, unspecified type  - Adult Pulmonary Medicine Referral  - General PFT Lab (Please always keep checked); Future  - Pulmonary Function Test; Future  - General PFT Lab (Please always keep checked)  - Echocardiogram Complete; Future    Cough, unspecified type  - Adult Pulmonary Medicine Referral  - General PFT Lab (Please always keep checked); Future  - Pulmonary Function Test; Future  - General PFT Lab (Please always keep checked)       With regards to his current flare, he feels he is improving and does not need meds.  His vitals including O2 are normal, and his lungs are clear.      Given his ongoing symptoms that seem to be progressing over time and not responsive to inhalers/steroids, we'll get PFTs and an echo ordered.  Pulmonology referral.      ARSENIO ELIZABETH,   [facility] - [place]   Hema is a 23 year old, presenting for the following health issues:  URI (Frequent infections)      HPI     23 year old male.  He states that over the last few years, his inhalers have been escalated multiple times in response to his breathing concerns.  He has a chart diagnosis of asthma, but he states he never had a problem as a child, and didn't use an inhaler until the past few years.  Now he feels he is getting sick more frequently the past few months, and he wonders if his inhalers are contributing to this.  He denies ever having any PFTs done (and none found in chart).  He has issues with dyspnea, wheezing, coughing, usually worse laying down.  He states that past prednisone bursts and/or tapers have not been of noticed benefit.    Current illness includes sinus and chest congestion for a few days, but he feels he is starting to improve.  No fever, no sputum.  Does not feel he needs any medication intervention today.    He would be interested in getting PFTs done and perhaps seeing a lung specialist.    Concern - Frequent URI infections  Onset: February  "2023  Description: Getting more infections than usual URI's  Intensity: moderate  Progression of Symptoms:  worsening  Accompanying Signs & Symptoms: Cough, congestion  Previous history of similar problem: Yes  Precipitating factors:        Worsened by: possibly asthma medictions  Alleviating factors:        Improved by: none  Therapies tried and outcome: None    Patient believes that he is getting more prone to illness and URI's than usual due to the Trelegy inhaler. Believes he may have Bronchitis at this time.        Review of Systems   Constitutional: Negative for fever.   Cardiovascular: Negative for chest pain, palpitations and peripheral edema.          Objective    /68   Pulse 77   Temp 98.8  F (37.1  C) (Tympanic)   Resp 18   Ht 1.905 m (6' 3\")   Wt 105.8 kg (233 lb 4.8 oz)   SpO2 97%   BMI 29.16 kg/m    Body mass index is 29.16 kg/m .  Physical Exam  Constitutional:       General: He is not in acute distress.     Appearance: Normal appearance.   HENT:      Head: Normocephalic and atraumatic.      Right Ear: Tympanic membrane, ear canal and external ear normal.      Left Ear: Tympanic membrane, ear canal and external ear normal.      Nose: Nose normal.      Mouth/Throat:      Mouth: Mucous membranes are moist.      Pharynx: Oropharynx is clear.   Eyes:      Conjunctiva/sclera: Conjunctivae normal.      Pupils: Pupils are equal, round, and reactive to light.   Cardiovascular:      Rate and Rhythm: Normal rate and regular rhythm.      Heart sounds: Normal heart sounds. No murmur heard.  Pulmonary:      Effort: Pulmonary effort is normal.      Breath sounds: Normal breath sounds. No wheezing, rhonchi or rales.   Musculoskeletal:      Right lower leg: No edema.      Left lower leg: No edema.   Lymphadenopathy:      Cervical: No cervical adenopathy.   Neurological:      Mental Status: He is alert and oriented to person, place, and time.                    "

## 2023-04-17 ENCOUNTER — TELEPHONE (OUTPATIENT)
Dept: FAMILY MEDICINE | Facility: OTHER | Age: 23
End: 2023-04-17

## 2023-04-17 DIAGNOSIS — R06.00 DYSPNEA, UNSPECIFIED TYPE: Primary | ICD-10-CM

## 2023-04-17 RX ORDER — PREDNISONE 20 MG/1
40 TABLET ORAL DAILY
Qty: 10 TABLET | Refills: 0 | Status: SHIPPED | OUTPATIENT
Start: 2023-04-17 | End: 2023-04-22

## 2023-04-17 ASSESSMENT — ENCOUNTER SYMPTOMS
PALPITATIONS: 0
FEVER: 0

## 2023-04-17 NOTE — TELEPHONE ENCOUNTER
Patient was seen on Thursday.  States he was offered Prednisone and thought he was feeling better so he did not take it.  States over the weekend he started wheezing and isn't feeling better.  Can he get the Prednisone sent to [facility]?

## 2023-04-29 ENCOUNTER — HEALTH MAINTENANCE LETTER (OUTPATIENT)
Age: 23
End: 2023-04-29

## 2023-05-03 ENCOUNTER — HOSPITAL ENCOUNTER (OUTPATIENT)
Dept: CARDIOLOGY | Facility: HOSPITAL | Age: 23
Discharge: HOME | End: 2023-05-03
Attending: FAMILY MEDICINE | Admitting: INTERNAL MEDICINE
Payer: COMMERCIAL

## 2023-05-03 DIAGNOSIS — R06.00 DYSPNEA, UNSPECIFIED TYPE: ICD-10-CM

## 2023-05-03 LAB — LVEF ECHO: NORMAL

## 2023-05-03 PROCEDURE — 93306 TTE W/DOPPLER COMPLETE: CPT

## 2023-05-03 PROCEDURE — 93306 TTE W/DOPPLER COMPLETE: CPT | Mod: 26 | Performed by: INTERNAL MEDICINE

## 2023-06-07 ENCOUNTER — OFFICE VISIT (OUTPATIENT)
Dept: FAMILY MEDICINE | Facility: OTHER | Age: 23
End: 2023-06-07
Attending: PHYSICIAN ASSISTANT
Payer: COMMERCIAL

## 2023-06-07 VITALS
WEIGHT: 244 LBS | DIASTOLIC BLOOD PRESSURE: 79 MMHG | BODY MASS INDEX: 30.34 KG/M2 | HEART RATE: 90 BPM | RESPIRATION RATE: 18 BRPM | OXYGEN SATURATION: 96 % | TEMPERATURE: 98.1 F | HEIGHT: 75 IN | SYSTOLIC BLOOD PRESSURE: 135 MMHG

## 2023-06-07 DIAGNOSIS — E55.9 VITAMIN D DEFICIENCY: ICD-10-CM

## 2023-06-07 DIAGNOSIS — R37 SEXUAL DYSFUNCTION: ICD-10-CM

## 2023-06-07 DIAGNOSIS — Z00.00 ROUTINE GENERAL MEDICAL EXAMINATION AT A HEALTH CARE FACILITY: Primary | ICD-10-CM

## 2023-06-07 DIAGNOSIS — Z11.59 ENCOUNTER FOR HEPATITIS C SCREENING TEST FOR LOW RISK PATIENT: ICD-10-CM

## 2023-06-07 DIAGNOSIS — Z11.4 ENCOUNTER FOR SCREENING FOR HIV: ICD-10-CM

## 2023-06-07 DIAGNOSIS — Z11.3 SCREEN FOR STD (SEXUALLY TRANSMITTED DISEASE): ICD-10-CM

## 2023-06-07 PROCEDURE — 90651 9VHPV VACCINE 2/3 DOSE IM: CPT | Performed by: PHYSICIAN ASSISTANT

## 2023-06-07 PROCEDURE — 99395 PREV VISIT EST AGE 18-39: CPT | Mod: 25 | Performed by: PHYSICIAN ASSISTANT

## 2023-06-07 PROCEDURE — 90632 HEPA VACCINE ADULT IM: CPT | Performed by: PHYSICIAN ASSISTANT

## 2023-06-07 PROCEDURE — 90472 IMMUNIZATION ADMIN EACH ADD: CPT | Performed by: PHYSICIAN ASSISTANT

## 2023-06-07 PROCEDURE — 90471 IMMUNIZATION ADMIN: CPT | Performed by: PHYSICIAN ASSISTANT

## 2023-06-07 ASSESSMENT — ENCOUNTER SYMPTOMS
COUGH: 1
SHORTNESS OF BREATH: 1
FREQUENCY: 1

## 2023-06-07 ASSESSMENT — PAIN SCALES - GENERAL: PAINLEVEL: NO PAIN (0)

## 2023-06-07 NOTE — PROGRESS NOTES
SUBJECTIVE:   CC: Hema is an 23 year old who presents for preventative health visit.       2023     3:24 PM   Additional Questions   Roomed by radha purdy   Accompanied by self     Healthy Habits:     Getting at least 3 servings of Calcium per day:  Yes    Bi-annual eye exam:  Yes    Dental care twice a year:  NO    Sleep apnea or symptoms of sleep apnea:  None    Diet:  Other    Frequency of exercise:  4-5 days/week    Duration of exercise:  Greater than 60 minutes    Taking medications regularly:  Yes    Medication side effects:  None    PHQ-2 Total Score: 0    Additional concerns today:  Yes    Blood work, hormonal and vitamins and kidney and liver.               Today's PHQ-2 Score:       2023     3:26 PM   PHQ-2 (  [employer])   Q1: Little interest or pleasure in doing things 0   Q2: Feeling down, depressed or hopeless 0   PHQ-2 Score 0           Social History     Tobacco Use     Smoking status: Former     Types: Dip, chew, snus or snuff, Other     Quit date: 2021     Years since quittin.8     Passive exposure: Past     Smokeless tobacco: Former     Types: Chew     Quit date: 2020   Vaping Use     Vaping status: Never Used   Substance Use Topics     Alcohol use: Yes     Comment: occ     Last PSA: No results found for: PSA not applicable.     Reviewed orders with patient. Reviewed health maintenance and updated orders accordingly - Yes  BP Readings from Last 3 Encounters:   23 135/79   23 112/68   22 128/74    Wt Readings from Last 3 Encounters:   23 110.7 kg (244 lb)   23 105.8 kg (233 lb 4.8 oz)   22 94.3 kg (208 lb)                  Patient Active Problem List   Diagnosis     Cough variant asthma     Seasonal allergic rhinitis, unspecified trigger     Past Surgical History:   Procedure Laterality Date     ENDOSCOPIC SINUS SURGERY Bilateral 2020    Procedure: BILATERAL ENDOSCOPIC SINUS SURGERY WITH PROPEL;  Surgeon: Lianne Mejía MD;   Location: [facility]     HC TOOTH EXTRACTION W/FORCEP       SEPTOPLASTY, TURBINOPLASTY, COMBINED Bilateral 2020    Procedure: SEPTOPLASTY, TURBINATE REDUCTION;  Surgeon: Lianne Mejía MD;  Location: [facility]       Social History     Tobacco Use     Smoking status: Former     Types: Dip, chew, snus or snuff, Other     Quit date: 2021     Years since quittin.8     Passive exposure: Past     Smokeless tobacco: Former     Types: Chew     Quit date: 2020   Vaping Use     Vaping status: Never Used   Substance Use Topics     Alcohol use: Yes     Comment: occ     Family History   Problem Relation Age of Onset     Asthma Mother      Anxiety Disorder Mother      Psychotic Disorder Father         panic attacks     Hypertension Father      Family History Negative Maternal Grandmother      Family History Negative Brother      Anxiety Disorder Brother         panic attack, drug abuse     Hypertension Maternal Grandfather      C.A.D. Maternal Grandfather      Family History Negative Paternal Grandmother      Family History Negative Paternal Grandfather      Gastrointestinal Disease Maternal Aunt         crohns     Heart Disease Other         family history Afib     Heart Disease Other         family history mitral valve     Thyroid Disease No family hx of          Current Outpatient Medications   Medication Sig Dispense Refill     albuterol (PROAIR HFA/PROVENTIL HFA/VENTOLIN HFA) 108 (90 Base) MCG/ACT inhaler Inhale 2 puffs into the lungs every 4 hours as needed for shortness of breath or wheezing 18 g 1     Cholecalciferol (VITAMIN D-3) 125 MCG (5000 UT) TABS Take by mouth daily       EPINEPHrine (ANY BX GENERIC EQUIV) 0.3 MG/0.3ML injection 2-pack Inject 0.3 mg into the muscle       fish oil-omega-3 fatty acids 1000 MG capsule Take 2 g by mouth daily       Fluticasone-Umeclidin-Vilanterol (TRELEGY ELLIPTA) 200-62.5-25 MCG/ACT oral inhaler Inhale 1 puff into the lungs daily 30 each 4     VENTOLIN  (90  "Base) MCG/ACT inhaler INHALE 2 PUFFS BY MOUTH EVERY 4 HOURS AS NEEDED FOR SHORTNESS OF BREATH DYSPNEA  OR  WHEEZING 18 g 1     Allergies   Allergen Reactions     Budesonide-Formoterol Fumarate Other (See Comments)     Rapid heart beat      Chicken-Derived Products (Egg) Itching     Sulfa Antibiotics      Unknown rxn. Rxn as infant     Recent Labs   Lab Test 11/01/21  0942 08/20/20  1246 08/01/18  1210   ALT 58  --  32   CR 0.74 0.75 0.63*   GFRESTIMATED >90 >90 >90   GFRESTBLACK  --  >90 >90   POTASSIUM 4.0 3.8 3.7   TSH 1.94  --  2.59        Reviewed and updated as needed this visit by clinical staff    Allergies  Meds              Reviewed and updated as needed this visit by Provider                   No past medical history on file.   Past Surgical History:   Procedure Laterality Date     ENDOSCOPIC SINUS SURGERY Bilateral 8/26/2020    Procedure: BILATERAL ENDOSCOPIC SINUS SURGERY WITH PROPEL;  Surgeon: Lianne Mejía MD;  Location: [facility]     HC TOOTH EXTRACTION W/FORCEP       SEPTOPLASTY, TURBINOPLASTY, COMBINED Bilateral 8/26/2020    Procedure: SEPTOPLASTY, TURBINATE REDUCTION;  Surgeon: Lianne Mejía MD;  Location: [facility]     OB History   No obstetric history on file.       Review of Systems   HENT: Positive for congestion.    Respiratory: Positive for cough and shortness of breath.    Genitourinary: Positive for frequency.         OBJECTIVE:   /79 (BP Location: Left arm, Patient Position: Sitting, Cuff Size: Adult Large)   Pulse 90   Temp 98.1  F (36.7  C) (Tympanic)   Resp 18   Ht 1.905 m (6' 3\")   Wt 110.7 kg (244 lb)   SpO2 96%   BMI 30.50 kg/m      Physical Exam  GENERAL: healthy, alert and no distress  EYES: Eyes grossly normal to inspection, PERRL and conjunctivae and sclerae normal  HENT: ear canals and TM's normal, nose and mouth without ulcers or lesions  NECK: no adenopathy, no asymmetry, masses, or scars and thyroid normal to palpation  RESP: lungs clear to auscultation " "- no rales, rhonchi or wheezes  CV: regular rate and rhythm, normal S1 S2, no S3 or S4, no murmur, click or rub, no peripheral edema and peripheral pulses strong  ABDOMEN: soft, nontender, no hepatosplenomegaly, no masses and bowel sounds normal  MS: no gross musculoskeletal defects noted, no edema  SKIN: no suspicious lesions or rashes  NEURO: Normal strength and tone, mentation intact and speech normal  PSYCH: mentation appears normal, affect normal/bright  LYMPH: no cervical, supraclavicular, axillary, or inguinal adenopathy    Diagnostic Test Results:  Labs reviewed in Epic  No results found for this or any previous visit (from the past 24 hour(s)).    ASSESSMENT/PLAN:   (Z00.00) Routine general medical examination at a health care facility  (primary encounter diagnosis)  Comment: He is quiet fit and works out 5 days a week. Limited intake of etoh, non smoker.  Not use of any other drugs. Labs are all ordered today.    Plan: CBC with platelets and differential,         Comprehensive metabolic panel (BMP + Alb, Alk         Phos, ALT, AST, Total. Bili, TP), Lipid Profile        (Chol, Trig, HDL, LDL calc), UA Macroscopic         with reflex to Microscopic and Culture, TSH         with free T4 reflex            (E55.9) Vitamin D deficiency  Comment: he is on supplement.   Plan: Vitamin D Deficiency                    COUNSELING:   Reviewed preventive health counseling, as reflected in patient instructions       Regular exercise       Healthy diet/nutrition       Vision screening       Safe sex practices/STD prevention       Advance Care Planning      BMI:   Estimated body mass index is 30.5 kg/m  as calculated from the following:    Height as of this encounter: 1.905 m (6' 3\").    Weight as of this encounter: 110.7 kg (244 lb).         He reports that he quit smoking about 22 months ago. His smoking use included dip, chew, snus or snuff and other. He has been exposed to tobacco smoke. He quit smokeless tobacco use " about 2 years ago.  His smokeless tobacco use included chew.        FIDENCIO Platt  [facility]

## 2023-06-08 ENCOUNTER — LAB (OUTPATIENT)
Dept: LAB | Facility: OTHER | Age: 23
End: 2023-06-08
Payer: COMMERCIAL

## 2023-06-08 DIAGNOSIS — E55.9 VITAMIN D DEFICIENCY: ICD-10-CM

## 2023-06-08 DIAGNOSIS — Z00.00 ROUTINE GENERAL MEDICAL EXAMINATION AT A HEALTH CARE FACILITY: ICD-10-CM

## 2023-06-08 DIAGNOSIS — R37 SEXUAL DYSFUNCTION: ICD-10-CM

## 2023-06-08 DIAGNOSIS — Z11.3 SCREEN FOR STD (SEXUALLY TRANSMITTED DISEASE): ICD-10-CM

## 2023-06-08 DIAGNOSIS — Z11.4 ENCOUNTER FOR SCREENING FOR HIV: ICD-10-CM

## 2023-06-08 DIAGNOSIS — Z11.59 ENCOUNTER FOR HEPATITIS C SCREENING TEST FOR LOW RISK PATIENT: ICD-10-CM

## 2023-06-08 LAB
ALBUMIN SERPL BCG-MCNC: 4.4 G/DL (ref 3.5–5.2)
ALBUMIN UR-MCNC: NEGATIVE MG/DL
ALP SERPL-CCNC: 99 U/L (ref 40–129)
ALT SERPL W P-5'-P-CCNC: 48 U/L (ref 10–50)
ANION GAP SERPL CALCULATED.3IONS-SCNC: 12 MMOL/L (ref 7–15)
APPEARANCE UR: CLEAR
AST SERPL W P-5'-P-CCNC: 33 U/L (ref 10–50)
BASOPHILS # BLD AUTO: 0.1 10E3/UL (ref 0–0.2)
BASOPHILS NFR BLD AUTO: 1 %
BILIRUB SERPL-MCNC: 0.9 MG/DL
BILIRUB UR QL STRIP: NEGATIVE
BUN SERPL-MCNC: 15.8 MG/DL (ref 6–20)
C TRACH DNA SPEC QL PROBE+SIG AMP: NEGATIVE
CALCIUM SERPL-MCNC: 9.4 MG/DL (ref 8.6–10)
CHLORIDE SERPL-SCNC: 102 MMOL/L (ref 98–107)
CHOLEST SERPL-MCNC: 167 MG/DL
COLOR UR AUTO: NORMAL
CREAT SERPL-MCNC: 0.77 MG/DL (ref 0.67–1.17)
DEPRECATED HCO3 PLAS-SCNC: 26 MMOL/L (ref 22–29)
EOSINOPHIL # BLD AUTO: 0.4 10E3/UL (ref 0–0.7)
EOSINOPHIL NFR BLD AUTO: 6 %
ERYTHROCYTE [DISTWIDTH] IN BLOOD BY AUTOMATED COUNT: 12.1 % (ref 10–15)
GFR SERPL CREATININE-BSD FRML MDRD: >90 ML/MIN/1.73M2
GLUCOSE SERPL-MCNC: 101 MG/DL (ref 70–99)
GLUCOSE UR STRIP-MCNC: NEGATIVE MG/DL
HCT VFR BLD AUTO: 46.2 % (ref 40–53)
HDLC SERPL-MCNC: 54 MG/DL
HGB BLD-MCNC: 15.7 G/DL (ref 13.3–17.7)
HGB UR QL STRIP: NEGATIVE
IMM GRANULOCYTES # BLD: 0 10E3/UL
IMM GRANULOCYTES NFR BLD: 0 %
KETONES UR STRIP-MCNC: NEGATIVE MG/DL
LDLC SERPL CALC-MCNC: 99 MG/DL
LEUKOCYTE ESTERASE UR QL STRIP: NEGATIVE
LYMPHOCYTES # BLD AUTO: 1.7 10E3/UL (ref 0.8–5.3)
LYMPHOCYTES NFR BLD AUTO: 29 %
MCH RBC QN AUTO: 30.1 PG (ref 26.5–33)
MCHC RBC AUTO-ENTMCNC: 34 G/DL (ref 31.5–36.5)
MCV RBC AUTO: 89 FL (ref 78–100)
MONOCYTES # BLD AUTO: 0.5 10E3/UL (ref 0–1.3)
MONOCYTES NFR BLD AUTO: 8 %
N GONORRHOEA DNA SPEC QL NAA+PROBE: NEGATIVE
NEUTROPHILS # BLD AUTO: 3.3 10E3/UL (ref 1.6–8.3)
NEUTROPHILS NFR BLD AUTO: 56 %
NITRATE UR QL: NEGATIVE
NONHDLC SERPL-MCNC: 113 MG/DL
NRBC # BLD AUTO: 0 10E3/UL
NRBC BLD AUTO-RTO: 0 /100
PH UR STRIP: 6.5 [PH] (ref 4.7–8)
PLATELET # BLD AUTO: 185 10E3/UL (ref 150–450)
POTASSIUM SERPL-SCNC: 4 MMOL/L (ref 3.4–5.3)
PROT SERPL-MCNC: 7 G/DL (ref 6.4–8.3)
RBC # BLD AUTO: 5.21 10E6/UL (ref 4.4–5.9)
SODIUM SERPL-SCNC: 140 MMOL/L (ref 136–145)
SP GR UR STRIP: 1.02 (ref 1–1.03)
TRIGL SERPL-MCNC: 71 MG/DL
TSH SERPL DL<=0.005 MIU/L-ACNC: 2.68 UIU/ML (ref 0.3–4.2)
UROBILINOGEN UR STRIP-MCNC: NORMAL MG/DL
WBC # BLD AUTO: 5.9 10E3/UL (ref 4–11)

## 2023-06-08 PROCEDURE — 36415 COLL VENOUS BLD VENIPUNCTURE: CPT

## 2023-06-08 PROCEDURE — 84270 ASSAY OF SEX HORMONE GLOBUL: CPT

## 2023-06-08 PROCEDURE — 80061 LIPID PANEL: CPT

## 2023-06-08 PROCEDURE — 87389 HIV-1 AG W/HIV-1&-2 AB AG IA: CPT

## 2023-06-08 PROCEDURE — 87591 N.GONORRHOEAE DNA AMP PROB: CPT

## 2023-06-08 PROCEDURE — 84403 ASSAY OF TOTAL TESTOSTERONE: CPT

## 2023-06-08 PROCEDURE — 87491 CHLMYD TRACH DNA AMP PROBE: CPT

## 2023-06-08 PROCEDURE — 82306 VITAMIN D 25 HYDROXY: CPT

## 2023-06-08 PROCEDURE — 80050 GENERAL HEALTH PANEL: CPT

## 2023-06-08 PROCEDURE — 81003 URINALYSIS AUTO W/O SCOPE: CPT

## 2023-06-08 PROCEDURE — 86803 HEPATITIS C AB TEST: CPT

## 2023-06-09 LAB
DEPRECATED CALCIDIOL+CALCIFEROL SERPL-MC: 64 UG/L (ref 20–75)
HCV AB SERPL QL IA: NONREACTIVE
HIV 1+2 AB+HIV1 P24 AG SERPL QL IA: NONREACTIVE
SHBG SERPL-SCNC: 21 NMOL/L (ref 11–80)

## 2023-06-12 LAB
TESTOST FREE SERPL-MCNC: 12.24 NG/DL
TESTOST SERPL-MCNC: 467 NG/DL (ref 240–950)

## 2023-08-17 DIAGNOSIS — J45.41 MODERATE PERSISTENT ASTHMA WITH EXACERBATION: ICD-10-CM

## 2023-08-18 NOTE — TELEPHONE ENCOUNTER
Jess      Last Written Prescription Date:  12.14.22  Last Fill Quantity: #30,   # refills: 4  Last Office Visit: 6.7.23  Future Office visit:       Routing refill request to provider for review/approval because:  Drug not on the [facility], [facility] or [facility] refill protocol or controlled substance

## 2023-09-29 ENCOUNTER — OFFICE VISIT (OUTPATIENT)
Dept: FAMILY MEDICINE | Facility: OTHER | Age: 23
End: 2023-09-29
Attending: FAMILY MEDICINE
Payer: COMMERCIAL

## 2023-09-29 VITALS
BODY MASS INDEX: 31.06 KG/M2 | SYSTOLIC BLOOD PRESSURE: 127 MMHG | TEMPERATURE: 98.2 F | DIASTOLIC BLOOD PRESSURE: 69 MMHG | WEIGHT: 248.5 LBS | OXYGEN SATURATION: 97 % | HEART RATE: 86 BPM

## 2023-09-29 DIAGNOSIS — Z11.3 SCREEN FOR STD (SEXUALLY TRANSMITTED DISEASE): ICD-10-CM

## 2023-09-29 DIAGNOSIS — R36.9 URETHRAL DISCHARGE: ICD-10-CM

## 2023-09-29 DIAGNOSIS — R30.0 DYSURIA: Primary | ICD-10-CM

## 2023-09-29 DIAGNOSIS — Z72.51 HIGH RISK HETEROSEXUAL BEHAVIOR: ICD-10-CM

## 2023-09-29 LAB
ALBUMIN UR-MCNC: NEGATIVE MG/DL
APPEARANCE UR: CLEAR
BILIRUB UR QL STRIP: NEGATIVE
COLOR UR AUTO: NORMAL
GLUCOSE UR STRIP-MCNC: NEGATIVE MG/DL
HGB UR QL STRIP: NEGATIVE
KETONES UR STRIP-MCNC: NEGATIVE MG/DL
LEUKOCYTE ESTERASE UR QL STRIP: NEGATIVE
NITRATE UR QL: NEGATIVE
PH UR STRIP: 5.5 [PH] (ref 4.7–8)
RBC URINE: <1 /HPF
SP GR UR STRIP: 1 (ref 1–1.03)
SQUAMOUS EPITHELIAL: 0 /HPF
UROBILINOGEN UR STRIP-MCNC: NORMAL MG/DL
WBC URINE: <1 /HPF

## 2023-09-29 PROCEDURE — 36415 COLL VENOUS BLD VENIPUNCTURE: CPT | Performed by: FAMILY MEDICINE

## 2023-09-29 PROCEDURE — 87491 CHLMYD TRACH DNA AMP PROBE: CPT | Performed by: FAMILY MEDICINE

## 2023-09-29 PROCEDURE — 86695 HERPES SIMPLEX TYPE 1 TEST: CPT | Performed by: FAMILY MEDICINE

## 2023-09-29 PROCEDURE — 86696 HERPES SIMPLEX TYPE 2 TEST: CPT | Performed by: FAMILY MEDICINE

## 2023-09-29 PROCEDURE — 81001 URINALYSIS AUTO W/SCOPE: CPT | Performed by: FAMILY MEDICINE

## 2023-09-29 PROCEDURE — 86780 TREPONEMA PALLIDUM: CPT | Performed by: FAMILY MEDICINE

## 2023-09-29 PROCEDURE — 99213 OFFICE O/P EST LOW 20 MIN: CPT | Performed by: FAMILY MEDICINE

## 2023-09-29 PROCEDURE — 87591 N.GONORRHOEAE DNA AMP PROB: CPT | Performed by: FAMILY MEDICINE

## 2023-09-29 RX ORDER — DOXYCYCLINE HYCLATE 100 MG
100 TABLET ORAL 2 TIMES DAILY
Qty: 14 TABLET | Refills: 0 | Status: SHIPPED | OUTPATIENT
Start: 2023-09-29 | End: 2023-10-06

## 2023-09-29 ASSESSMENT — ASTHMA QUESTIONNAIRES: ACT_TOTALSCORE: 16

## 2023-09-29 ASSESSMENT — PAIN SCALES - GENERAL: PAINLEVEL: NO PAIN (1)

## 2023-09-29 NOTE — PROGRESS NOTES
"  Assessment & Plan     Dysuria  - GC/Chlamydia by PCR - [facility]  - UA Macroscopic with reflex to Microscopic and Culture  - Herpes Simplex Virus 1 and 2 IgG    Urethral discharge  - doxycycline hyclate (VIBRA-TABS) 100 MG tablet; Take 1 tablet (100 mg) by mouth 2 times daily for 7 days  - GC/Chlamydia by PCR - [facility]  - UA Macroscopic with reflex to Microscopic and Culture    Screen for STD (sexually transmitted disease)  - Treponema Abs w Reflex to RPR and Titer    High risk heterosexual behavior  - Treponema Abs w Reflex to RPR and Titer  - Herpes Simplex Virus 1 and 2 IgG      Starting on Doxy pending results of testing.  Patient aware treatment may change once results are back.  Follow-up if worsening, not improving as anticipated/discussed, or any new symptoms/concerns.       ARSENIO ELIZABETH, [facility] Garrido is a 23 year old, presenting for the following health issues:  Urinary Problem        9/29/2023     3:45 PM   Additional Questions   Roomed by Ruthann Sandoval   Accompanied by none         9/29/2023     3:45 PM   Patient Reported Additional Medications   Patient reports taking the following new medications zoloft       HPI       22yo male here for concern of burning sensation at his urethral meatus area, constant feeling, a little worse with urination.  Symptoms x1-2 weeks.  Mild increase in urinary frequency - thought due to increased fluids with his creatine and workouts.  No rash or genital sores.  Does have a clear discharge from penis.  No testicle concerns.  No back or abdominal pain.  No long-term sexual partner, but has has 2 \"hook ups\" in the past 2 months.  Oral and vaginal intercourse.     Also felt a \"pop\" in his left upper abdomin/[lower chest a couple days ago while lifting weights.  Not bothering him anymore.    Genitourinary - Male  Onset/Duration:1-2 weeks ago  Description:   Dysuria (painful urination): YES- slight discomfort}  Hematuria (blood in " urine): No  Frequency: YES  Waking at night to urinate: YES  Hesitancy (delay in urine): No  Retention (unable to empty): YES  Decrease in urinary flow: No  Incontinence: No  Progression of Symptoms:  same  Accompanying Signs & Symptoms:  Fever: No  Back/Flank pain: No  Urethral discharge: YES- clear  Testicle lumps/masses/pain: No  Nausea and/or vomiting: No  Abdominal pain: No  History:   History of frequent UTI s: YES- as a child  History of kidney stones: No  History of hernias: No  Personal or Family history of Prostate problems: No  Sexually active: YES  Precipitating or alleviating factors: None  Therapies tried and outcome: none        Review of Systems   Constitutional:  Negative for chills and fever.   Respiratory:  Negative for shortness of breath.    Gastrointestinal:  Negative for abdominal pain.   Genitourinary:  Negative for flank pain and hematuria.   Skin:  Negative for rash.            Objective    /69 (BP Location: Left arm, Patient Position: Sitting, Cuff Size: Adult Regular)   Pulse 86   Temp 98.2  F (36.8  C) (Tympanic)   Wt 112.7 kg (248 lb 8 oz)   SpO2 97%   BMI 31.06 kg/m    Body mass index is 31.06 kg/m .  Physical Exam  Constitutional:       General: He is not in acute distress.     Appearance: Normal appearance.   Eyes:      Conjunctiva/sclera: Conjunctivae normal.   Cardiovascular:      Rate and Rhythm: Normal rate and regular rhythm.      Heart sounds: Normal heart sounds.   Pulmonary:      Effort: Pulmonary effort is normal.      Breath sounds: Normal breath sounds. No wheezing.   Abdominal:      General: Bowel sounds are normal.      Palpations: Abdomen is soft.      Tenderness: There is no abdominal tenderness.   Genitourinary:     Comments: No prostate mass or tenderness.  No groin skin lesions/rash.  No urethral discharge.  No testicular mass or tenderness.  No inguinal lymphadenopathy.  Neurological:      Mental Status: He is alert and oriented to person, place, and  time.

## 2023-09-30 LAB
C TRACH DNA SPEC QL PROBE+SIG AMP: NEGATIVE
N GONORRHOEA DNA SPEC QL NAA+PROBE: NEGATIVE

## 2023-10-02 LAB
HSV1 IGG SERPL QL IA: 48.4 INDEX
HSV1 IGG SERPL QL IA: ABNORMAL
HSV2 IGG SERPL QL IA: 1.69 INDEX
HSV2 IGG SERPL QL IA: ABNORMAL
T PALLIDUM AB SER QL: NONREACTIVE

## 2023-10-04 ENCOUNTER — TRANSFERRED RECORDS (OUTPATIENT)
Dept: HEALTH INFORMATION MANAGEMENT | Facility: CLINIC | Age: 23
End: 2023-10-04

## 2023-10-05 ASSESSMENT — ENCOUNTER SYMPTOMS
FEVER: 0
SHORTNESS OF BREATH: 0
HEMATURIA: 0
ABDOMINAL PAIN: 0
CHILLS: 0
FLANK PAIN: 0

## 2023-12-06 ENCOUNTER — ANCILLARY PROCEDURE (OUTPATIENT)
Dept: GENERAL RADIOLOGY | Facility: OTHER | Age: 23
End: 2023-12-06
Attending: NURSE PRACTITIONER
Payer: COMMERCIAL

## 2023-12-06 ENCOUNTER — OFFICE VISIT (OUTPATIENT)
Dept: FAMILY MEDICINE | Facility: OTHER | Age: 23
End: 2023-12-06
Attending: NURSE PRACTITIONER
Payer: COMMERCIAL

## 2023-12-06 VITALS
BODY MASS INDEX: 32.62 KG/M2 | WEIGHT: 261 LBS | HEART RATE: 104 BPM | DIASTOLIC BLOOD PRESSURE: 60 MMHG | SYSTOLIC BLOOD PRESSURE: 136 MMHG | OXYGEN SATURATION: 95 % | TEMPERATURE: 97.6 F

## 2023-12-06 DIAGNOSIS — J45.41 MODERATE PERSISTENT ASTHMA WITH EXACERBATION: ICD-10-CM

## 2023-12-06 DIAGNOSIS — J20.9 ACUTE BRONCHITIS WITH SYMPTOMS > 10 DAYS: ICD-10-CM

## 2023-12-06 DIAGNOSIS — J02.9 SORE THROAT: Primary | ICD-10-CM

## 2023-12-06 DIAGNOSIS — R05.2 SUBACUTE COUGH: ICD-10-CM

## 2023-12-06 LAB — GROUP A STREP BY PCR: NOT DETECTED

## 2023-12-06 PROCEDURE — 99213 OFFICE O/P EST LOW 20 MIN: CPT | Performed by: NURSE PRACTITIONER

## 2023-12-06 PROCEDURE — 71046 X-RAY EXAM CHEST 2 VIEWS: CPT | Mod: TC | Performed by: RADIOLOGY

## 2023-12-06 PROCEDURE — 87651 STREP A DNA AMP PROBE: CPT | Performed by: NURSE PRACTITIONER

## 2023-12-06 RX ORDER — SALMETEROL XINAFOATE 50 MCG
1 BLISTER, WITH INHALATION DEVICE INHALATION 2 TIMES DAILY
COMMUNITY
Start: 2023-01-09

## 2023-12-06 RX ORDER — AZITHROMYCIN 250 MG/1
TABLET, FILM COATED ORAL
Qty: 6 TABLET | Refills: 0 | Status: SHIPPED | OUTPATIENT
Start: 2023-12-06 | End: 2023-12-11

## 2023-12-06 RX ORDER — PREDNISONE 20 MG/1
20 TABLET ORAL 2 TIMES DAILY
Qty: 10 TABLET | Refills: 0 | Status: SHIPPED | OUTPATIENT
Start: 2023-12-06 | End: 2023-12-11

## 2023-12-06 ASSESSMENT — ASTHMA QUESTIONNAIRES: ACT_TOTALSCORE: 17

## 2023-12-06 NOTE — COMMUNITY RESOURCES LIST (ENGLISH)
12/06/2023   [facility]  N/A  For questions about this resource list or additional care needs, please contact your primary care clinic or care manager.  Phone: [phone]   Email: N/A   Address: [address] [place] [id number]   Hours: N/A        Housing       Coordinated Entry access point  1  [facility] Administrative Office Distance: 15.98 miles      In-Person   [address] [place] [address]  Language: English  Hours: Mon - Fri 8:00 AM - 4:30 PM  Fees: Free   Phone: (829) 144-2963 Email: [email or web address].[email or web address] Website: http://www.[employer].org     Housing search assistance  2  [facility] Shelter Distance: 7.14 miles      In-Person   [address] [place], [place] [id number]  Language: English  Hours: Mon - Sun Open 24 Hours  Fees: Free   Phone: (636) 472-8778 Website: http://www.[employer].org     3  [facility] Administrative Office Distance: 15.98 miles      Phone/Virtual   [address] [place] [address]  Language: English  Hours: Mon - Fri 8:00 AM - 4:30 PM  Fees: Free   Phone: (855) 837-1888 Email: [email or web address]@[employer].org Website: http://www.[employer].org     Shelter for families  4  [facility] Shelter Distance: 7.14 miles      In-Person   [address] [place] [address]  Language: English  Hours: Mon - Sun Open 24 Hours  Fees: Free   Phone: (903) 434-2107 Website: http://www.[employer].org     5  [facility] Distance: 16.06 miles      In-Person   [address] [place] [address]  Language: English  Hours: Mon - Sun Open 24 Hours  Fees: Free   Phone: (911) 610-1163 Website: http://www.[employer].org     Shelter for individuals  6  [facility] Shelter Distance: 7.14 miles      In-Person   [address] [place] [address]  Language: English  Hours: Mon - Sun  Open 24 Hours  Fees: Free   Phone: (343) 837-5053 Website: http://www.[employer]     7  [employer] [facility] Distance: 16.06 miles      In-Person   [address] [place] [address]  Language: English  Hours: Mon - Sun Open 24 Hours  Fees: Free   Phone: (619) 320-9388 Website: http://wwwA[employer]          Important Numbers & Websites       Emergency Services   911  City Services   311  Poison Control   (343) 542-4020  Suicide Prevention Lifeline   (469) 194-7377 (TALK)  Child Abuse Hotline   (619) 266-6416 (4-A-Child)  Sexual Assault Hotline   (305) 978-5748 (HOPE)  National Runaway Safeline   (363) 415-5038 (RUNAWAY)  All-Options Talkline   (326) 150-9295  Substance Abuse Referral   (330) 476-3691 (HELP)

## 2023-12-06 NOTE — PROGRESS NOTES
"  Assessment & Plan     Acute bronchitis with symptoms > 10 days  Moderate persistent asthma with exacerbation  No sign of pneumonia on chest XR  No active wheezing with symptoms going on 3 weeks and chest tightness plan to treat for bronchitis and exacerbation of asthma  Antibiotic and prednisone as prescribed.  He has used in the past without any problems   - XR CHEST 2 VW (Clinic Performed); Future  - azithromycin (ZITHROMAX) 250 MG tablet; Take 2 tablets (500 mg) by mouth daily for 1 day, THEN 1 tablet (250 mg) daily for 4 days.  - predniSONE (DELTASONE) 20 MG tablet; Take 1 tablet (20 mg) by mouth 2 times daily for 5 days    Sore throat  Negative strep throat  Encouraged self throat care   - Group A Streptococcus PCR Throat Swab ([facility] ONLY)        Ordering of each unique test         BMI:   Estimated body mass index is 32.62 kg/m  as calculated from the following:    Height as of 6/7/23: 1.905 m (6' 3\").    Weight as of this encounter: 118.4 kg (261 lb).       See Patient Instructions    No follow-ups on file.    ZOHRA Hooper [facility] [place] Garrido is a 23 year old, presenting for the following health issues:  URI        12/6/2023    10:25 AM   Additional Questions   Roomed by Steven Franco CMA   Accompanied by Self         12/6/2023    10:25 AM   Patient Reported Additional Medications   Patient reports taking the following new medications None       HPI     Acute Illness  Acute illness concerns: cough, sweats, SOB  Onset/Duration: 3 weeks  Symptoms:  Fever: No  Chills/Sweats: YES  Headache (location?): YES  Sinus Pressure: YES  Conjunctivitis:  No  Ear Pain: no  Rhinorrhea: No  Congestion: YES  Sore Throat: YES  Cough: YES-productive of clear sputum  Wheeze: No  Decreased Appetite: YES  Nausea: No  Vomiting: No  Diarrhea: No  Dysuria/Freq.: No  Dysuria or Hematuria: No  Fatigue/Achiness: YES  Sick/Strep Exposure: No  Therapies tried and outcome: " None  Negative home covid test  Breathing, coughing and SOB worsening   History of asthma and bronchitis       Review of Systems   CONSTITUTIONAL: NEGATIVE for fever, chills, change in weight  INTEGUMENTARY/SKIN: NEGATIVE for worrisome rashes, moles or lesions  ENT/MOUTH: feels like lymph nodes in neck maybe swollen, neck is sore, sore throat, ear pain and sinus congestion   RESP:worsening cough, SOB   CV: NEGATIVE for chest pain, palpitations or peripheral edema  GI: some nausea and decreased appetite   : negative for dysuria, hematuria, decreased urinary stream, erectile dysfunction  NEURO: weakness and fativue       Objective    /60   Pulse 104   Temp 97.6  F (36.4  C) (Tympanic)   Wt 118.4 kg (261 lb)   SpO2 95%   BMI 32.62 kg/m    Body mass index is 32.62 kg/m .  Physical Exam   GENERAL: alert and ill appearing   HENT: normal cephalic/atraumatic, ear canals and TM's normal, nose and mouth without ulcers or lesions, oropharynx erythema, and oral mucous membranes moist  NECK: no adenopathy, no asymmetry, masses, or scars and thyroid normal to palpation  RESP: lungs clear to auscultation - no rales, rhonchi or wheezes and occasional cough   CV: regular rate and rhythm, normal S1 S2, no S3 or S4, no murmur, click or rub, no peripheral edema and peripheral pulses strong  ABDOMEN: soft, nontender, no hepatosplenomegaly, no masses and bowel sounds normal  NEURO: Normal strength and tone, mentation intact and speech normal  PSYCH: mentation appears normal, affect normal/bright  LYMPH: normal ant/post cervical, supraclavicular nodes    Results for orders placed or performed in visit on 12/06/23   XR CHEST 2 VW (Clinic Performed)     Status: None    Narrative    PROCEDURE:  XR CHEST 2 VIEWS    HISTORY: Subacute cough    COMPARISON:  Radiographs 12/16/2022    FINDINGS: PA and lateral chest radiographs    Cardiomediastinal silhouette is within normal limits.  No focal consolidation, effusion or pneumothorax.     No suspicious osseous lesion or subdiaphragmatic free air.      Impression    IMPRESSION:    No acute cardiopulmonary process.    DOLLY JONES MD         SYSTEM ID:  [id number]   Results for orders placed or performed in visit on 12/06/23   Group A Streptococcus PCR Throat Swab ([facility] ONLY)     Status: Normal    Specimen: Throat; Swab   Result Value Ref Range    Group A strep by PCR Not Detected Not Detected    Narrative    The Xpert Xpress Strep A test, performed on the [employer]  Instrument Systems, is a rapid, qualitative in vitro diagnostic test for the detection of Streptococcus pyogenes (Group A ß-hemolytic Streptococcus, Strep A) in throat swab specimens from patients with signs and symptoms of pharyngitis. The Xpert Xpress Strep A test can be used as an aid in the diagnosis of Group A Streptococcal pharyngitis. The assay is not intended to monitor treatment for Group A Streptococcus infections. The Xpert Xpress Strep A test utilizes an automated real-time polymerase chain reaction (PCR) to detect Streptococcus pyogenes DNA.

## 2023-12-06 NOTE — PATIENT INSTRUCTIONS
Rest stay hydrated  Start prednisone and antibiotic today   Use inhaler regularly for now     Follow up next week if no improvement  To the ER if any concerning SOB, difficulty breathing or chest pain

## 2024-01-02 DIAGNOSIS — J45.41 MODERATE PERSISTENT ASTHMA WITH EXACERBATION: ICD-10-CM

## 2024-01-04 RX ORDER — FLUTICASONE FUROATE, UMECLIDINIUM BROMIDE AND VILANTEROL TRIFENATATE 200; 62.5; 25 UG/1; UG/1; UG/1
POWDER RESPIRATORY (INHALATION)
Qty: 180 EACH | Refills: 0 | Status: SHIPPED | OUTPATIENT
Start: 2024-01-04 | End: 2024-05-03

## 2024-01-04 NOTE — TELEPHONE ENCOUNTER
Jess Gomez      Last Written Prescription Date:  8/18/23  Last Fill Quantity: 90,   # refills: 0  Last Office Visit: 12/6/23  Future Office visit:       Routing refill request to provider for review/approval because:

## 2024-02-05 ENCOUNTER — TELEPHONE (OUTPATIENT)
Dept: CARE COORDINATION | Facility: OTHER | Age: 24
End: 2024-02-05

## 2024-02-05 NOTE — TELEPHONE ENCOUNTER
"RN CC received message from patient that he has \"chest/ear infection\" Says pain and congestion in ear and looking to see if he can get in to see PCP in \"next week or so.\"   No outreach provided.  Sent message to Huc to please call to schedule with PCP or covering Provider.  Sally Cline RN  Care Coordination  "

## 2024-02-09 ENCOUNTER — OFFICE VISIT (OUTPATIENT)
Dept: FAMILY MEDICINE | Facility: OTHER | Age: 24
End: 2024-02-09
Attending: PHYSICIAN ASSISTANT
Payer: COMMERCIAL

## 2024-02-09 VITALS
BODY MASS INDEX: 31.98 KG/M2 | TEMPERATURE: 98.4 F | OXYGEN SATURATION: 97 % | WEIGHT: 262.6 LBS | SYSTOLIC BLOOD PRESSURE: 132 MMHG | RESPIRATION RATE: 15 BRPM | DIASTOLIC BLOOD PRESSURE: 74 MMHG | HEART RATE: 86 BPM | HEIGHT: 76 IN

## 2024-02-09 DIAGNOSIS — H65.191 ACUTE MUCOID OTITIS MEDIA OF RIGHT EAR: Primary | ICD-10-CM

## 2024-02-09 PROCEDURE — 99213 OFFICE O/P EST LOW 20 MIN: CPT | Performed by: PHYSICIAN ASSISTANT

## 2024-02-09 RX ORDER — FLUTICASONE PROPIONATE 50 MCG
1 SPRAY, SUSPENSION (ML) NASAL DAILY
Qty: 18.2 ML | Refills: 3 | Status: SHIPPED | OUTPATIENT
Start: 2024-02-09

## 2024-02-09 ASSESSMENT — ASTHMA QUESTIONNAIRES
ACT_TOTALSCORE: 11
QUESTION_4 LAST FOUR WEEKS HOW OFTEN HAVE YOU USED YOUR RESCUE INHALER OR NEBULIZER MEDICATION (SUCH AS ALBUTEROL): ONE OR TWO TIMES PER DAY
QUESTION_5 LAST FOUR WEEKS HOW WOULD YOU RATE YOUR ASTHMA CONTROL: POORLY CONTROLLED
ACT_TOTALSCORE: 11
QUESTION_3 LAST FOUR WEEKS HOW OFTEN DID YOUR ASTHMA SYMPTOMS (WHEEZING, COUGHING, SHORTNESS OF BREATH, CHEST TIGHTNESS OR PAIN) WAKE YOU UP AT NIGHT OR EARLIER THAN USUAL IN THE MORNING: TWO OR THREE NIGHTS A WEEK
QUESTION_1 LAST FOUR WEEKS HOW MUCH OF THE TIME DID YOUR ASTHMA KEEP YOU FROM GETTING AS MUCH DONE AT WORK, SCHOOL OR AT HOME: SOME OF THE TIME
QUESTION_2 LAST FOUR WEEKS HOW OFTEN HAVE YOU HAD SHORTNESS OF BREATH: ONCE A DAY

## 2024-02-09 ASSESSMENT — PAIN SCALES - GENERAL: PAINLEVEL: NO PAIN (0)

## 2024-02-09 NOTE — PROGRESS NOTES
"  Assessment & Plan     Acute mucoid otitis media of right ear  He is going to be given Augmentin.  Infected ear and sinus.  Using saline solution. Allergy hx. Hx of Asthma is very well controlled on his Trelegy.   - amoxicillin-clavulanate (AUGMENTIN) 875-125 MG tablet; Take 1 tablet by mouth 2 times daily  - fluticasone (FLONASE) 50 MCG/ACT nasal spray; Spray 1 spray into both nostrils daily    Review of external notes as documented elsewhere in note  Ordering of each unique test  Prescription drug management  10  minutes spent by me on the date of the encounter doing chart review, history and exam, documentation and further activities per the note      BMI  Estimated body mass index is 31.96 kg/m  as calculated from the following:    Height as of this encounter: 1.93 m (6' 4\").    Weight as of this encounter: 119.1 kg (262 lb 9.6 oz).   Weight management plan: Discussed healthy diet and exercise guidelines      See Patient Instructions    No follow-ups on file.    Dulce Garrido is a 23 year old, presenting for the following health issues:  Ear Problem and Sinus Problem        2/9/2024     2:45 PM   Additional Questions   Roomed by Danita Carter   Accompanied by self         2/9/2024     2:45 PM   Patient Reported Additional Medications   Patient reports taking the following new medications none     HPI     Acute Illness  Acute illness concerns: Right ear feels clogged with fluid  Onset/Duration: 1 week  Symptoms:  Fever: No  Chills/Sweats: No  Headache (location?): No  Sinus Pressure: No  Conjunctivitis:  No  Ear Pain: YES: both ears, right ear worse  Rhinorrhea: No  Congestion: YES- chest and sinuses   Sore Throat: No  Cough: YES-non-productive  Wheeze: No  Decreased Appetite: No  Nausea: No  Vomiting: No  Diarrhea: No  Dysuria/Freq.: No  Dysuria or Hematuria: No  Fatigue/Achiness: No  Sick/Strep Exposure: No  Therapies tried and outcome: nasal spray, ear drop relief drops all OTC        Review of " "Systems  Constitutional, neuro, ENT, endocrine, pulmonary, cardiac, gastrointestinal, genitourinary, musculoskeletal, integument and psychiatric systems are negative, except as otherwise noted.      Objective    /74   Pulse 86   Temp 98.4  F (36.9  C) (Tympanic)   Resp 15   Ht 1.93 m (6' 4\")   Wt 119.1 kg (262 lb 9.6 oz)   SpO2 97%   BMI 31.96 kg/m    Body mass index is 31.96 kg/m .  Physical Exam   GENERAL: alert and no distress  EYES: Eyes grossly normal to inspection, PERRL and conjunctivae and sclerae normal  HENT: ear canals and TM's normal, nose and mouth without ulcers or lesions  NECK: no adenopathy, no asymmetry, masses, or scars  RESP: lungs clear to auscultation - no rales, rhonchi or wheezes  CV: regular rate and rhythm, normal S1 S2, no S3 or S4, no murmur, click or rub, no peripheral edema  ABDOMEN: soft, nontender, no hepatosplenomegaly, no masses and bowel sounds normal  MS: no gross musculoskeletal defects noted, no edema  SKIN: no suspicious lesions or rashes  PSYCH: mentation appears normal, affect normal/bright  LYMPH: no cervical, supraclavicular, axillary, or inguinal adenopathy    No results found for any visits on 02/09/24.        Signed Electronically by: FIDENCIO Platt    "

## 2024-05-03 DIAGNOSIS — J45.41 MODERATE PERSISTENT ASTHMA WITH EXACERBATION: ICD-10-CM

## 2024-05-03 RX ORDER — FLUTICASONE FUROATE, UMECLIDINIUM BROMIDE AND VILANTEROL TRIFENATATE 200; 62.5; 25 UG/1; UG/1; UG/1
POWDER RESPIRATORY (INHALATION)
Qty: 180 EACH | Refills: 3 | Status: SHIPPED | OUTPATIENT
Start: 2024-05-03

## 2024-09-14 ENCOUNTER — HEALTH MAINTENANCE LETTER (OUTPATIENT)
Age: 24
End: 2024-09-14

## 2024-09-26 DIAGNOSIS — F33.1 MODERATE EPISODE OF RECURRENT MAJOR DEPRESSIVE DISORDER (H): Primary | ICD-10-CM

## 2024-09-27 NOTE — TELEPHONE ENCOUNTER
SERTRALINE HCL TABS 50MG       Last Written Prescription Date:  0  Last Fill Quantity: 0,   # refills: 0  Last Office Visit: 09/29/2023  Future Office visit:       Routing refill request to provider for review/approval because:  Medication is reported/historical    Kimberly Boecker, RN

## 2024-10-29 ENCOUNTER — OFFICE VISIT (OUTPATIENT)
Dept: FAMILY MEDICINE | Facility: OTHER | Age: 24
End: 2024-10-29
Attending: FAMILY MEDICINE
Payer: COMMERCIAL

## 2024-10-29 VITALS
RESPIRATION RATE: 17 BRPM | HEIGHT: 76 IN | SYSTOLIC BLOOD PRESSURE: 102 MMHG | BODY MASS INDEX: 29.77 KG/M2 | OXYGEN SATURATION: 95 % | TEMPERATURE: 99 F | HEART RATE: 70 BPM | DIASTOLIC BLOOD PRESSURE: 50 MMHG | WEIGHT: 244.5 LBS

## 2024-10-29 DIAGNOSIS — J02.0 ACUTE STREPTOCOCCAL PHARYNGITIS: ICD-10-CM

## 2024-10-29 DIAGNOSIS — J02.9 SORE THROAT: Primary | ICD-10-CM

## 2024-10-29 LAB — GROUP A STREP BY PCR: DETECTED

## 2024-10-29 PROCEDURE — 87651 STREP A DNA AMP PROBE: CPT | Performed by: FAMILY MEDICINE

## 2024-10-29 PROCEDURE — 99213 OFFICE O/P EST LOW 20 MIN: CPT | Performed by: FAMILY MEDICINE

## 2024-10-29 RX ORDER — CEPHALEXIN 500 MG/1
500 CAPSULE ORAL 3 TIMES DAILY
Qty: 30 CAPSULE | Refills: 0 | Status: SHIPPED | OUTPATIENT
Start: 2024-10-29 | End: 2024-11-08

## 2024-10-29 ASSESSMENT — ASTHMA QUESTIONNAIRES
QUESTION_3 LAST FOUR WEEKS HOW OFTEN DID YOUR ASTHMA SYMPTOMS (WHEEZING, COUGHING, SHORTNESS OF BREATH, CHEST TIGHTNESS OR PAIN) WAKE YOU UP AT NIGHT OR EARLIER THAN USUAL IN THE MORNING: ONCE OR TWICE
QUESTION_5 LAST FOUR WEEKS HOW WOULD YOU RATE YOUR ASTHMA CONTROL: SOMEWHAT CONTROLLED
ACT_TOTALSCORE: 19
ACT_TOTALSCORE: 19
QUESTION_4 LAST FOUR WEEKS HOW OFTEN HAVE YOU USED YOUR RESCUE INHALER OR NEBULIZER MEDICATION (SUCH AS ALBUTEROL): ONCE A WEEK OR LESS
QUESTION_2 LAST FOUR WEEKS HOW OFTEN HAVE YOU HAD SHORTNESS OF BREATH: ONCE OR TWICE A WEEK
QUESTION_1 LAST FOUR WEEKS HOW MUCH OF THE TIME DID YOUR ASTHMA KEEP YOU FROM GETTING AS MUCH DONE AT WORK, SCHOOL OR AT HOME: A LITTLE OF THE TIME

## 2024-10-29 ASSESSMENT — PAIN SCALES - GENERAL: PAINLEVEL_OUTOF10: SEVERE PAIN (7)

## 2024-10-29 ASSESSMENT — PATIENT HEALTH QUESTIONNAIRE - PHQ9
10. IF YOU CHECKED OFF ANY PROBLEMS, HOW DIFFICULT HAVE THESE PROBLEMS MADE IT FOR YOU TO DO YOUR WORK, TAKE CARE OF THINGS AT HOME, OR GET ALONG WITH OTHER PEOPLE: NOT DIFFICULT AT ALL
SUM OF ALL RESPONSES TO PHQ QUESTIONS 1-9: 0
SUM OF ALL RESPONSES TO PHQ QUESTIONS 1-9: 0

## 2024-10-29 NOTE — PROGRESS NOTES
"  Assessment & Plan     Sore throat  - Group A Streptococcus PCR Throat Swab ([place] ONLY)  Positive strep PCR.    Acute streptococcal pharyngitis  - cephALEXin (KEFLEX) 500 MG capsule; Take 1 capsule (500 mg) by mouth 3 times daily for 10 days.  Unclear if recurrent vs treatment failure with Amox (?dose) x10 days.  Will change to Keflex per UpToDate recommendations for treatment failure with Amox.    Follow-up if worsening, not improving as anticipated/discussed, or any new symptoms/concerns.       Subjective   Hema is a 24 year old, presenting for the following health issues:  URI        10/29/2024     3:55 PM   Additional Questions   Roomed by Sophie Ragsdale   Accompanied by self        25yo male.  Developed ST and seen in outside UC, Dx strep based on swab, given Amox (?dose) bid x10 days.  Started improving within 24 hours of starting meds.  Finished all 10 days, and ST started returning within 1-2 days of finishing meds.  No known exposures, changed his toothbrush, washes water bottles, etc.  Symptoms feel exactly the same.    Acute Illness  Acute illness concerns: Sore throat  Onset/Duration: 3-4 weeks ago  Symptoms:  Fever: YES  Chills/Sweats: YES  Headache (location?): YES  Sinus Pressure: YES  Conjunctivitis:  No  Ear Pain: no  Rhinorrhea: No  Congestion: YES  Sore Throat: YES  Cough: no  Wheeze: No  Decreased Appetite: YES  Nausea: No  Vomiting: No  Diarrhea: No  Dysuria/Freq.: No  Dysuria or Hematuria: No  Fatigue/Achiness: YES- fatigue and achiness  Sick/Strep Exposure: YES  Therapies tried and outcome: ABX which was finished on 10/26/2024, (did not help)and cough drops (which helped)            Objective    /50   Pulse 70   Temp 99  F (37.2  C) (Tympanic)   Resp 17   Ht 1.93 m (6' 4\")   Wt 110.9 kg (244 lb 8 oz)   SpO2 95%   BMI 29.76 kg/m    Body mass index is 29.76 kg/m .  Physical Exam  Constitutional:       General: He is not in acute distress.     Appearance: Normal appearance. "   HENT:      Head: Normocephalic and atraumatic.      Right Ear: Tympanic membrane normal.      Left Ear: Tympanic membrane normal.      Nose: Nose normal.      Mouth/Throat:      Comments: Posterior oropharynx with erythema/exudate.    Eyes:      Conjunctiva/sclera: Conjunctivae normal.      Pupils: Pupils are equal, round, and reactive to light.   Cardiovascular:      Rate and Rhythm: Normal rate and regular rhythm.      Heart sounds: Normal heart sounds. No murmur heard.  Pulmonary:      Effort: Pulmonary effort is normal.      Breath sounds: Normal breath sounds.   Lymphadenopathy:      Cervical: No cervical adenopathy.   Neurological:      Mental Status: He is alert and oriented to person, place, and time.                    Signed Electronically by: ARSENIO ELIZABETH DO

## 2024-11-06 ENCOUNTER — MYC MEDICAL ADVICE (OUTPATIENT)
Dept: FAMILY MEDICINE | Facility: OTHER | Age: 24
End: 2024-11-06

## 2024-11-06 RX ORDER — SERTRALINE HYDROCHLORIDE 25 MG/1
25 TABLET, FILM COATED ORAL DAILY
Qty: 30 TABLET | Refills: 0 | Status: CANCELLED | OUTPATIENT
Start: 2024-11-06

## 2024-11-16 NOTE — TELEPHONE ENCOUNTER
-Due to stroke  -Aggressive therapy  -PT/OT/SLP eval and treat   Called patient , message given.  States he hasn't had anymore episodes but he has not been taking the Symbicort.  States his asthma is flaring up because of that and want's to know if he should take the Symbicort or something else?    He is scheduled for a 2 week f/u and advised on the metoprolol

## 2024-12-07 ENCOUNTER — HOSPITAL ENCOUNTER (EMERGENCY)
Facility: HOSPITAL | Age: 24
Discharge: HOME | End: 2024-12-08
Attending: INTERNAL MEDICINE
Payer: COMMERCIAL

## 2024-12-07 DIAGNOSIS — R45.851 SUICIDAL IDEATION: ICD-10-CM

## 2024-12-07 DIAGNOSIS — R45.1 AGITATION: ICD-10-CM

## 2024-12-07 LAB
BASOPHILS # BLD AUTO: 0.1 10E3/UL (ref 0–0.2)
BASOPHILS NFR BLD AUTO: 1 %
EOSINOPHIL # BLD AUTO: 0.1 10E3/UL (ref 0–0.7)
EOSINOPHIL NFR BLD AUTO: 1 %
ERYTHROCYTE [DISTWIDTH] IN BLOOD BY AUTOMATED COUNT: 12.3 % (ref 10–15)
HCT VFR BLD AUTO: 43.8 % (ref 40–53)
HGB BLD-MCNC: 14.7 G/DL (ref 13.3–17.7)
IMM GRANULOCYTES # BLD: 0.1 10E3/UL
IMM GRANULOCYTES NFR BLD: 0 %
LYMPHOCYTES # BLD AUTO: 0.8 10E3/UL (ref 0.8–5.3)
LYMPHOCYTES NFR BLD AUTO: 7 %
MCH RBC QN AUTO: 29.4 PG (ref 26.5–33)
MCHC RBC AUTO-ENTMCNC: 33.6 G/DL (ref 31.5–36.5)
MCV RBC AUTO: 88 FL (ref 78–100)
MONOCYTES # BLD AUTO: 0.4 10E3/UL (ref 0–1.3)
MONOCYTES NFR BLD AUTO: 3 %
NEUTROPHILS # BLD AUTO: 10.2 10E3/UL (ref 1.6–8.3)
NEUTROPHILS NFR BLD AUTO: 89 %
NRBC # BLD AUTO: 0 10E3/UL
NRBC BLD AUTO-RTO: 0 /100
PLATELET # BLD AUTO: 204 10E3/UL (ref 150–450)
RBC # BLD AUTO: 5 10E6/UL (ref 4.4–5.9)
WBC # BLD AUTO: 11.5 10E3/UL (ref 4–11)

## 2024-12-07 PROCEDURE — 99285 EMERGENCY DEPT VISIT HI MDM: CPT

## 2024-12-07 PROCEDURE — 82040 ASSAY OF SERUM ALBUMIN: CPT | Performed by: INTERNAL MEDICINE

## 2024-12-07 PROCEDURE — 82077 ASSAY SPEC XCP UR&BREATH IA: CPT | Performed by: INTERNAL MEDICINE

## 2024-12-07 PROCEDURE — 82247 BILIRUBIN TOTAL: CPT | Performed by: INTERNAL MEDICINE

## 2024-12-07 PROCEDURE — 36415 COLL VENOUS BLD VENIPUNCTURE: CPT | Performed by: INTERNAL MEDICINE

## 2024-12-07 PROCEDURE — 99284 EMERGENCY DEPT VISIT MOD MDM: CPT | Performed by: INTERNAL MEDICINE

## 2024-12-07 PROCEDURE — 85025 COMPLETE CBC W/AUTO DIFF WBC: CPT | Performed by: INTERNAL MEDICINE

## 2024-12-07 ASSESSMENT — COLUMBIA-SUICIDE SEVERITY RATING SCALE - C-SSRS
1. IN THE PAST MONTH, HAVE YOU WISHED YOU WERE DEAD OR WISHED YOU COULD GO TO SLEEP AND NOT WAKE UP?: YES
4. HAVE YOU HAD THESE THOUGHTS AND HAD SOME INTENTION OF ACTING ON THEM?: NO
6. HAVE YOU EVER DONE ANYTHING, STARTED TO DO ANYTHING, OR PREPARED TO DO ANYTHING TO END YOUR LIFE?: YES
2. HAVE YOU ACTUALLY HAD ANY THOUGHTS OF KILLING YOURSELF IN THE PAST MONTH?: YES
5. HAVE YOU STARTED TO WORK OUT OR WORKED OUT THE DETAILS OF HOW TO KILL YOURSELF? DO YOU INTEND TO CARRY OUT THIS PLAN?: YES
3. HAVE YOU BEEN THINKING ABOUT HOW YOU MIGHT KILL YOURSELF?: YES

## 2024-12-08 VITALS
OXYGEN SATURATION: 96 % | RESPIRATION RATE: 16 BRPM | TEMPERATURE: 98.8 F | DIASTOLIC BLOOD PRESSURE: 107 MMHG | WEIGHT: 235 LBS | SYSTOLIC BLOOD PRESSURE: 160 MMHG | HEIGHT: 76 IN | BODY MASS INDEX: 28.62 KG/M2 | HEART RATE: 66 BPM

## 2024-12-08 PROBLEM — G47.00 INSOMNIA: Status: ACTIVE | Noted: 2024-12-08

## 2024-12-08 PROBLEM — R45.851 SUICIDAL IDEATION: Status: ACTIVE | Noted: 2024-12-08

## 2024-12-08 PROBLEM — F29 PSYCHOSIS, UNSPECIFIED PSYCHOSIS TYPE (H): Status: ACTIVE | Noted: 2024-12-08

## 2024-12-08 LAB
ALBUMIN SERPL BCG-MCNC: 5.1 G/DL (ref 3.5–5.2)
ALP SERPL-CCNC: 116 U/L (ref 40–150)
ALT SERPL W P-5'-P-CCNC: 34 U/L (ref 0–70)
ANION GAP SERPL CALCULATED.3IONS-SCNC: 13 MMOL/L (ref 7–15)
AST SERPL W P-5'-P-CCNC: 35 U/L (ref 0–45)
BILIRUB SERPL-MCNC: 1.2 MG/DL
BUN SERPL-MCNC: 19.3 MG/DL (ref 6–20)
CALCIUM SERPL-MCNC: 9.3 MG/DL (ref 8.8–10.4)
CHLORIDE SERPL-SCNC: 100 MMOL/L (ref 98–107)
CREAT SERPL-MCNC: 0.82 MG/DL (ref 0.67–1.17)
EGFRCR SERPLBLD CKD-EPI 2021: >90 ML/MIN/1.73M2
ETHANOL SERPL-MCNC: <0.01 G/DL
GLUCOSE SERPL-MCNC: 180 MG/DL (ref 70–99)
HCO3 SERPL-SCNC: 24 MMOL/L (ref 22–29)
HOLD SPECIMEN: NORMAL
POTASSIUM SERPL-SCNC: 4.1 MMOL/L (ref 3.4–5.3)
PROT SERPL-MCNC: 8.3 G/DL (ref 6.4–8.3)
SODIUM SERPL-SCNC: 137 MMOL/L (ref 135–145)

## 2024-12-08 PROCEDURE — 250N000011 HC RX IP 250 OP 636: Performed by: INTERNAL MEDICINE

## 2024-12-08 PROCEDURE — 96372 THER/PROPH/DIAG INJ SC/IM: CPT | Performed by: INTERNAL MEDICINE

## 2024-12-08 RX ORDER — MIDAZOLAM HYDROCHLORIDE 5 MG/ML
7.5 INJECTION, SOLUTION INTRAMUSCULAR; INTRAVENOUS ONCE
Status: COMPLETED | OUTPATIENT
Start: 2024-12-08 | End: 2024-12-08

## 2024-12-08 RX ORDER — HALOPERIDOL 5 MG/ML
10 INJECTION INTRAMUSCULAR ONCE
Status: COMPLETED | OUTPATIENT
Start: 2024-12-08 | End: 2024-12-08

## 2024-12-08 RX ORDER — MIDAZOLAM HYDROCHLORIDE 5 MG/ML
7.5 INJECTION, SOLUTION INTRAMUSCULAR; INTRAVENOUS ONCE
Status: DISCONTINUED | OUTPATIENT
Start: 2024-12-08 | End: 2024-12-08

## 2024-12-08 RX ADMIN — HALOPERIDOL LACTATE 10 MG: 5 INJECTION, SOLUTION INTRAMUSCULAR at 02:39

## 2024-12-08 RX ADMIN — MIDAZOLAM 7.5 MG: 5 INJECTION INTRAMUSCULAR; INTRAVENOUS at 02:39

## 2024-12-08 ASSESSMENT — ENCOUNTER SYMPTOMS
ABDOMINAL PAIN: 0
DIZZINESS: 0
CHEST TIGHTNESS: 0
PALPITATIONS: 0
CONFUSION: 0
NAUSEA: 0
FREQUENCY: 0
PARANOIA: 0
BLOOD IN STOOL: 0
FEVER: 0
DISORGANIZED SPEECH: 1
ANAL BLEEDING: 0
AGITATION: 1
VOMITING: 0
ABDOMINAL DISTENTION: 0
WEAKNESS: 0
NUMBNESS: 0
COUGH: 0
LIGHT-HEADEDNESS: 0
BACK PAIN: 0
WHEEZING: 0
NERVOUS/ANXIOUS: 1
SHORTNESS OF BREATH: 0
HEADACHES: 0
FLANK PAIN: 0
AGGRESSION: 1
DIAPHORESIS: 0
MYALGIAS: 0
COLOR CHANGE: 0
DYSURIA: 0
SLEEP DISTURBANCE: 1
CHILLS: 0
VOICE CHANGE: 0
NECK PAIN: 0

## 2024-12-08 ASSESSMENT — ACTIVITIES OF DAILY LIVING (ADL)
ADLS_ACUITY_SCORE: 41

## 2024-12-08 NOTE — ED NOTES
"Patient appropriate with nurse and expressed relief when nurse clarified that she was part of the medical team, not part of the PD or legal team. Patient present with tangential speech and appears focused on therapy. Has stated he wants to be better, but doesn't want to take medication. States he has been on zoloft for 2-3 years and has been weaning himself off of it. Patient stated he has a poor sleep schedule and poor sense of timeline, so to check his chart. Has stopped taking it now. He states trust in his brother and states his brother had ordered him food. When nurse asked how the police arrived to his house tonight, he stated, \"I don't want to talk about that right now.\" And appeared to close off. Nurse changed topic and patient returned to talking without issue.    "
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
## 2024-12-08 NOTE — ED NOTES
AVS reviewed, no questions at this time.   Safety plan given, no questions.   Patient to waiting room to wait for his brother to pick him up.   Declined discharge vitals.     He does have an appointment w/ his therapist tomorrow @ 1000.

## 2024-12-08 NOTE — ED NOTES
Patient awake and talking with nurse and 1:1 sitter. Patient appropriate and calm, smiling and conversing with staff. Ordering breakfast and agreeable to speaking with DEC  again. Patients speech is organized and relevant to situation. Patient appreciative of and kind to staff, even with answers he is unhappy about.     Nurse called DEC , Christine. Per Christine, she will assign another DEC  to re-assess.

## 2024-12-08 NOTE — DISCHARGE INSTRUCTIONS
Please review the attached safety plan after this emergency department visit.  You were seen here for an episode of abnormal thoughts, suicidal ideation, and agitation.  You were given some medications to help calm down and relax overnight.  We are glad that you are feeling better this morning.  We do need you to follow-up as soon as possible with your established prescriber as well as your therapist.  If you have any severe worsening of symptoms including worsening thoughts of self-harm, safety concerns, hallucinations, paranoia, etc. we should reevaluate right away at the hospital.

## 2024-12-08 NOTE — PLAN OF CARE
Update to plan of care:  Patient Hema placed on 72 hour hold 12/08/24 02:27 AM by attending physician as noted by ED RN.  Remains in ED on observation to assist in determination for likely etiology of current symptoms.  LVM for patient brother Saul () to call writer for collateral. Cannot reach patient emergency contacts.

## 2024-12-08 NOTE — ED PROVIDER NOTES
History     Chief Complaint   Patient presents with    Suicidal     The history is provided by the patient and the police.   Mental Health Problem  Presenting symptoms: aggressive behavior, agitation, disorganized speech, disorganized thought process, suicidal thoughts and suicidal threats    Presenting symptoms: no paranoid behavior    Patient accompanied by:  Law enforcement  Onset quality:  Unable to specify  Timing:  Constant  Chronicity:  New  Associated symptoms: anxiety    Associated symptoms: no abdominal pain, no chest pain and no headaches        Allergies:  Allergies   Allergen Reactions    Budesonide-Formoterol Fumarate Other (See Comments)     Rapid heart beat     Chicken-Derived Products (Egg) Itching    Sulfa Antibiotics      Unknown rxn. Rxn as infant       Problem List:    Patient Active Problem List    Diagnosis Date Noted    Insomnia 12/08/2024     Priority: Medium     Per patient report      Psychosis, unspecified psychosis type (H) 12/08/2024     Priority: Medium    Suicidal ideation 12/08/2024     Priority: Medium    Cough variant asthma 06/09/2020     Priority: Medium    Seasonal allergic rhinitis, unspecified trigger 06/09/2020     Priority: Medium    Psychophysiologic insomnia 08/06/2019     Priority: Medium    JENAE (generalized anxiety disorder) 03/06/2019     Priority: Medium        Past Medical History:    No past medical history on file.    Past Surgical History:    Past Surgical History:   Procedure Laterality Date    ENDOSCOPIC SINUS SURGERY Bilateral 8/26/2020    Procedure: BILATERAL ENDOSCOPIC SINUS SURGERY WITH PROPEL;  Surgeon: Lianne Mejía MD;  Location: [facility] OR     TOOTH EXTRACTION W/FORCEP      SEPTOPLASTY, TURBINOPLASTY, COMBINED Bilateral 8/26/2020    Procedure: SEPTOPLASTY, TURBINATE REDUCTION;  Surgeon: Lianne Mejía MD;  Location: [facility] OR       Family History:    Family History   Problem Relation Age of Onset    Asthma Mother     Anxiety Disorder Mother      Psychotic Disorder Father         panic attacks    Hypertension Father     Family History Negative Maternal Grandmother     Family History Negative Brother     Anxiety Disorder Brother         panic attack, drug abuse    Hypertension Maternal Grandfather     C.A.D. Maternal Grandfather     Family History Negative Paternal Grandmother     Family History Negative Paternal Grandfather     Gastrointestinal Disease Maternal Aunt         crohns    Heart Disease Other         family history Afib    Heart Disease Other         family history mitral valve    Thyroid Disease No family hx of        Social History:  Marital Status:  Single [1]  Social History     Tobacco Use    Smoking status: Former     Types: Dip, chew, snus or snuff, Other     Quit date: 8/1/2021     Years since quitting: 3.3     Passive exposure: Past    Smokeless tobacco: Former     Types: Chew     Quit date: 8/28/2020   Vaping Use    Vaping status: Never Used   Substance Use Topics    Alcohol use: Yes     Comment: occ    Drug use: No        Medications:    albuterol (PROAIR HFA/PROVENTIL HFA/VENTOLIN HFA) 108 (90 Base) MCG/ACT inhaler  Cholecalciferol (VITAMIN D-3) 125 MCG (5000 UT) TABS  EPINEPHrine (ANY BX GENERIC EQUIV) 0.3 MG/0.3ML injection 2-pack  fish oil-omega-3 fatty acids 1000 MG capsule  fluticasone (FLONASE) 50 MCG/ACT nasal spray  Fluticasone-Umeclidin-Vilanterol (TRELEGY ELLIPTA) 200-62.5-25 MCG/ACT oral inhaler  SEREVENT DISKUS 50 MCG/ACT inhaler  sertraline (ZOLOFT) 50 MG tablet          Review of Systems   Constitutional:  Negative for chills, diaphoresis and fever.   HENT:  Negative for voice change.    Eyes:  Negative for visual disturbance.   Respiratory:  Negative for cough, chest tightness, shortness of breath and wheezing.    Cardiovascular:  Negative for chest pain, palpitations and leg swelling.   Gastrointestinal:  Negative for abdominal distention, abdominal pain, anal bleeding, blood in stool, nausea and vomiting.  "  Genitourinary:  Negative for decreased urine volume, dysuria, flank pain and frequency.   Musculoskeletal:  Negative for back pain, gait problem, myalgias and neck pain.   Skin:  Negative for color change, pallor and rash.   Neurological:  Negative for dizziness, syncope, weakness, light-headedness, numbness and headaches.   Psychiatric/Behavioral:  Positive for agitation, behavioral problems, sleep disturbance and suicidal ideas. Negative for confusion and paranoia. The patient is nervous/anxious.        Physical Exam   BP: (!) 160/107  Pulse: 86  Temp: 98.8  F (37.1  C)  Resp: 18  Height: 193 cm (6' 4\")  Weight: 106.6 kg (235 lb)  SpO2: 97 %      Physical Exam  Vitals and nursing note reviewed.   Constitutional:       Appearance: He is well-developed.   HENT:      Head: Normocephalic and atraumatic.   Eyes:      Conjunctiva/sclera: Conjunctivae normal.      Pupils: Pupils are equal, round, and reactive to light.   Neck:      Thyroid: No thyromegaly.      Vascular: No JVD.      Trachea: No tracheal deviation.   Cardiovascular:      Rate and Rhythm: Normal rate and regular rhythm.      Heart sounds: Normal heart sounds. No murmur heard.     No gallop.   Pulmonary:      Effort: Pulmonary effort is normal. No respiratory distress.      Breath sounds: Normal breath sounds. No stridor. No wheezing or rales.   Chest:      Chest wall: No tenderness.   Abdominal:      General: Bowel sounds are normal. There is no distension.      Palpations: Abdomen is soft. There is no mass.      Tenderness: There is no abdominal tenderness. There is no guarding or rebound.   Musculoskeletal:         General: No tenderness. Normal range of motion.      Cervical back: Normal range of motion and neck supple.   Lymphadenopathy:      Cervical: No cervical adenopathy.   Skin:     General: Skin is warm.      Coloration: Skin is not pale.      Findings: No erythema or rash.   Neurological:      Mental Status: He is alert and oriented to " person, place, and time.   Psychiatric:         Attention and Perception: He does not perceive auditory hallucinations.         Mood and Affect: Affect is labile and angry.         Speech: Speech is tangential.         Behavior: Behavior is uncooperative, agitated and hyperactive.         Judgment: Judgment is impulsive and inappropriate.         ED Course        Procedures                Results for orders placed or performed during the hospital encounter of 12/07/24 (from the past 24 hours)   Comprehensive metabolic panel   Result Value Ref Range    Sodium 137 135 - 145 mmol/L    Potassium 4.1 3.4 - 5.3 mmol/L    Carbon Dioxide (CO2) 24 22 - 29 mmol/L    Anion Gap 13 7 - 15 mmol/L    Urea Nitrogen 19.3 6.0 - 20.0 mg/dL    Creatinine 0.82 0.67 - 1.17 mg/dL    GFR Estimate >90 >60 mL/min/1.73m2    Calcium 9.3 8.8 - 10.4 mg/dL    Chloride 100 98 - 107 mmol/L    Glucose 180 (H) 70 - 99 mg/dL    Alkaline Phosphatase 116 40 - 150 U/L    AST 35 0 - 45 U/L    ALT 34 0 - 70 U/L    Protein Total 8.3 6.4 - 8.3 g/dL    Albumin 5.1 3.5 - 5.2 g/dL    Bilirubin Total 1.2 <=1.2 mg/dL   CBC with Platelets & Differential    Narrative    The following orders were created for panel order CBC with Platelets & Differential.  Procedure                               Abnormality         Status                     ---------                               -----------         ------                     CBC with platelets and d...[[id number]]  Abnormal            Final result                 Please view results for these tests on the individual orders.   Ethyl Alcohol Level   Result Value Ref Range    Alcohol ethyl <0.01 <=0.01 g/dL   CBC with platelets and differential   Result Value Ref Range    WBC Count 11.5 (H) 4.0 - 11.0 10e3/uL    RBC Count 5.00 4.40 - 5.90 10e6/uL    Hemoglobin 14.7 13.3 - 17.7 g/dL    Hematocrit 43.8 40.0 - 53.0 %    MCV 88 78 - 100 fL    MCH 29.4 26.5 - 33.0 pg    MCHC 33.6 31.5 - 36.5 g/dL    RDW 12.3 10.0 - 15.0 %     Platelet Count 204 150 - 450 10e3/uL    % Neutrophils 89 %    % Lymphocytes 7 %    % Monocytes 3 %    % Eosinophils 1 %    % Basophils 1 %    % Immature Granulocytes 0 %    NRBCs per 100 WBC 0 <1 /100    Absolute Neutrophils 10.2 (H) 1.6 - 8.3 10e3/uL    Absolute Lymphocytes 0.8 0.8 - 5.3 10e3/uL    Absolute Monocytes 0.4 0.0 - 1.3 10e3/uL    Absolute Eosinophils 0.1 0.0 - 0.7 10e3/uL    Absolute Basophils 0.1 0.0 - 0.2 10e3/uL    Absolute Immature Granulocytes 0.1 <=0.4 10e3/uL    Absolute NRBCs 0.0 10e3/uL   Extra Tube    Narrative    The following orders were created for panel order Extra Tube.  Procedure                               Abnormality         Status                     ---------                               -----------         ------                     Extra Red Top Tube[[id number]]                               Final result                 Please view results for these tests on the individual orders.   Extra Red Top Tube   Result Value Ref Range    Hold Specimen JI        Medications   haloperidol lactate (HALDOL) injection 10 mg (10 mg Intramuscular $Given 12/8/24 0239)   midazolam (VERSED) injection 7.5 mg (7.5 mg Intramuscular $Given 12/8/24 0239)       Assessments & Plan (with Medical Decision Making)   Suicidal threats at home such using guns to hurt himself  Agitated and non cooperative in ER  Disorganized thought process, rapid and tangential talking     DEC assessment appreciated , that recommended pt should be on hold for now, re eval at AM for final disposition  Pt became aggressive and sedated for safety    S/o to Dr Gates at the change of shift.   I have reviewed the nursing notes.    I have reviewed the findings, diagnosis, plan and need for follow up with the patient.        New Prescriptions    No medications on file       Final diagnoses:   Suicidal ideation   Agitation       12/7/2024   [facility]       Chauncey Lee MD  12/08/24 [id number]

## 2024-12-08 NOTE — CONSULTS
Diagnostic Evaluation Consultation  Crisis Assessment    Patient Name: Hema Rico  Age:  24 year old  Legal Sex: male  Gender Identity: male  Pronouns:   Race: White  Ethnicity: Not  or   Language: English      Patient was assessed: Virtual: [employer]   Crisis Assessment Start Date: 12/08/24  Crisis Assessment Start Time: 0017  Crisis Assessment Stop Time: 0034  Patient location: [facility]                             ED08    Referral Data and Chief Complaint  Hema Rico presents to the ED via police. Patient is presenting to the ED for the following concerns: Verbal agitation, Anxiety, Paranoia, Suicidal ideation, Worsening psychosocial stress.   Factors that make the mental health crisis life threatening or complex are:  Patient tangential; delusional; inconsistent in presentation; mentions having gun and bullet recently but denies SI; told EMS he was suicidal,.      Informed Consent and Assessment Methods  Explained the crisis assessment process, including applicable information disclosures and limits to confidentiality, assessed understanding of the process, and obtained consent to proceed with the assessment.  Assessment methods included conducting a formal interview with patient, review of medical records, collaboration with medical staff, and obtaining relevant collateral information from family and community providers when available.  : done     Patient response to interventions: needs reinforcement, unacceptance expressed  Coping skills were attempted to reduce the crisis:  states he has therapist; isolation     History of the Crisis   Brought in by Ariana CARRASCO. Pt c/o withdrawing from zoloft. PD put patient on hold because he made suicidal comments stating he had a gun and intended to kill himself. On arrival patient is hyper verbal, tangential speech, states he does not want to kill himself. States he would like to take a shower then leave. Has not been on any meds for  "\"awhile\". States he does see a therapist. Denies ETOH use, states he took an undisclosed drug that \"comes from the earth\".  Has hx of insomnia.    Brief Psychosocial History  Family:  Single, Children no  Support System:  Sibling(s), Other (specify) (therapist Holly)  Employment Status:  other (see comments) (unknown)  Source of Income:  unable to assess  Financial Environmental Concerns:  other (see comments) (patient mentions unspecified financial stressors)  Current Hobbies:  interaction with pets  Barriers in Personal Life:  mental health concerns    Significant Clinical History  Current Anxiety Symptoms:  racing thoughts, excessive worry  Current Depression/Trauma:  avoidance, sense of doom, difficulty concentrating, impaired decision making, irritable, helplessness, thoughts of death/suicide  Current Somatic Symptoms:  racing thoughts, anxious  Current Psychosis/Thought Disturbance:  impulsive, agitation, hyperverbal, distractability, flight of ideas  Current Eating Symptoms:     Chemical Use History:  Alcohol: None (patient denying drug use but tells ED he took \"a drug that comes from the earth.\")   Past diagnosis:  Anxiety Disorder  Family history:  Anxiety Disorder, Substance Use Disorder  Past treatment:  Individual therapy, Psychiatric Medication Management  Details of most recent treatment:  Says he has a therapist. Discontinuation of Zoloft unknown time frame  Other relevant history:          Collateral Information  Is there collateral information: No (writer left msg for patient brother Saul ; unable to reach parents/emergency contacts)        Risk Assessment  [place] Suicide Severity Rating Scale Full Clinical Version:  Suicidal Ideation  Q6 Suicide Behavior (Lifetime): yes          [place] Suicide Severity Rating Scale Recent:   Suicidal Ideation (Recent)  Q1 Wished to be Dead (Past Month): yes  Q2 Suicidal Thoughts (Past Month): yes  Q3 Suicidal Thought Method: yes  Q4 Suicidal Intent " without Specific Plan: yes  Q5 Suicide Intent with Specific Plan: no  If yes to Q6, within past 3 months?: no  Level of Risk per Screen: high risk          Environmental or Psychosocial Events: public humiliation, other life stressors  Protective Factors: Protective Factors: strong bond to family unit, community support, or employment, supportive ongoing medical and mental health care relationships    Does the patient have thoughts of harming others? Feels Like Hurting Others: no  Previous Attempt to Hurt Others:  (none known)  Current presentation: Confused, Irritable  Violence Threats in Past 6 Months: none known  Current Violence Plan or Thoughts: denies  Is the patient engaging in sexually inappropriate behavior?: no  Duty to warn initiated: yes    Is the patient engaging in sexually inappropriate behavior?  no        Mental Status Exam   Affect: Dramatic  Appearance: Appropriate  Attention Span/Concentration: Other (please comment) (variable)  Eye Contact: Intense, Variable, Engaged    Fund of Knowledge: Appropriate   Language /Speech Content: Fluent  Language /Speech Volume: Normal  Language /Speech Rate/Productions: Hyperverbal  Recent Memory: Variable  Remote Memory: Variable  Mood: Anxious, Irritable  Orientation to Person: Yes   Orientation to Place: Yes  Orientation to Time of Day: Yes  Orientation to Date: Yes     Situation (Do they understand why they are here?): Yes  Psychomotor Behavior: Hyperactive  Thought Content: Delusions, Paranoia, Suicidal  Thought Form: Paranoia, Tangential, Obsessive/Perseverative     Medication  Psychotropic medications:   Medication Orders - Psychiatric (From admission, onward)      None             Current Care Team  Patient Care Team:  Pema Allison PA as PCP - General (Family Practice)  Pema Allison PA as Assigned PCP    Diagnosis  Patient Active Problem List   Diagnosis Code    JENAE (generalized anxiety disorder) F41.1    Psychophysiologic insomnia F51.04     "Cough variant asthma J45.991    Seasonal allergic rhinitis, unspecified trigger J30.2    Insomnia G47.00    Psychosis, unspecified psychosis type (H) F29    Suicidal ideation R45.851       Primary Problem This Admission  Active Hospital Problems    Insomnia      *Psychosis, unspecified psychosis type (H)      Suicidal ideation      JENAE (generalized anxiety disorder)        Clinical Summary and Substantiation of Recommendations   Patient Hema brought to ED after Hema called EMS for medical.  EMS called PD when patient appeared to be in mental health crisis.  Patient in ED hyper verbal, tangential. Hema tells writer he does not want to talk to anyone except his therapist Holly from [place] who works at the [facility]  He says \"this is all childhood trauma.  No one understands this.  He says he wants to leave, that he feels like a \"prisoner.\" He says he is afraid.  Asked what he is afraid of, he asks if writer can see \"orb\" above his head.  Writer tells Hema there is a bright glare on the wall above Hema's head from the fluorescent lights.   He states at one point that he is withdrawing from Zoloft. He tells ED triage staff that he  \"took an undisclosed drug that comes from the earth.\"  He mentions not wanting to be on any medications.  He talks of his \"celestrial ancestors,\" states he does not want to kill himself.  He says at one point, \"I had the rifle in my hand, the bullet in another. But I think it's something else besides the drugs.  My father is an addict.  I am trying not to be that.  I want to isolate.  The world does not understand. I want to live but I must isolate\"  He continues mentioning his therapist Holly.  He says several times, \"this is not the drugs.  I don't know about schizophrenia.  I don't want to take medications\"   Hema has stated he trusts his brother, he gives his brother's name as Saul but does not give writer number.  Writer tells Hema we want to make sure " someone he trusts knows he is here. Hema says he only wants his therapist contacted.  He offers to send writer a picture of therapist.  Writer finds a Lacy Lawrence in [place] at [facility].  Not available after hours.  Writer is unsure if this is Hema's therapist.  Left vm for patient's brother Saul.  Cannot reach other collaterals.  Saul is somewhat agitated at times in ED but able to redirect at this time.  Attending MD informed. Hema is refusing labs this encounter.  Writer finds no hx of substance induced psychosis, drug abuse or past psychotic episodes in patient chart.      Patient coping skills attempted to reduce the crisis:  states he has therapist; isolation    Disposition  Recommended disposition: Observation        Reviewed case and recommendations with attending provider. Attending Name: Chauncey Lee MD       Attending concurs with disposition: yes       Patient and/or validated legal guardian concurs with disposition:   no       Final disposition:  observation    Legal status on admission:  (BHARGAV; 72 recommended if tries to leave prior to ability to make good decisions for self and evaluation of safety)    Assessment Details   Total duration spent with the patient: 17 min     CPT code(s) utilized: Non-Billable    CAMILA Drummond, Psychotherapist  DEC - Triage & Transition Services  Callback: [phone]

## 2024-12-08 NOTE — ED NOTES
ED Provider Signout Note    Initial Brief HPI/ED Course: Patient is a 24-year-old male who presents to the emergency department with disorganized thoughts, pressured speech, suicidal statements at home and agitation.  He was fairly disorganized on arrival, had made some suicidal ideation statements.  He was placed on a prehospital hold which has been continued here.  Initially evaluated by DEC but unable to really participate fully in evaluation.  Suspicion is also fairly high for possible substance abuse although patient does not clearly admit to a specific drug and has not produced a urine sample so far.  Seen by crisis social work initially and recommended for overnight observation in the emergency department with reevaluation this morning and final disposition. Did become agitated overnight and was given Haldol and midazolam for sedation.    MDM: Patient more awake this morning. Re-evaluated by DEC.  Patient reporting that he feels much better having slept all night.  Consistent sleep has been a problem for him.  He is much more lucid now, able to have a linear conversation with me.  Not exhibiting signs of paranoia or psychosis. No further pressured speech.  He is eager to get home, planning to do some physical work around his house and get things cleaned up today.  He is eager to get back to his dog.  DEC evaluator was able to reach out to the patient's brother who  has secured the patient's firearm and the patient was comfortable with him holding onto it for the time being.  Patient is well-connected with both a prescriber and therapist.  It sounds like his therapist may be able able to meet with him as soon as tomorrow afternoon.  At this point, DEC is recommending discharge and I think this is reasonable given his significant clinical improvement.  Did have a long discussion with the patient about returning to the hospital if he has any other concerns including further thoughts of self-harm, paranoia,  safety concerns, etc.  Patient was discharged in stable condition.     Afshin Dacosta MD  12/08/24 0813

## 2024-12-08 NOTE — ED NOTES
Patient up and ambulating to bathroom independently. Patient appropriate and polite with 1:1 sitter.

## 2024-12-08 NOTE — ED NOTES
"Writer reached patient brother Saul ().  Saul tells writer Hema began exhibiting symptoms about two weeks ago.  Saul says he was with Hema earlier tonight.  He has never seen patient behave as Hema did earlier tonight. Hema \"wasn't making any sense.\" Saul does not know when Hema discontinued Zoloft.  Saul has heard Hema has therapist for first time earlier tonight.     Saul went to patient's home earlier tonight to check on Ausin.  Saul says patient had gun in basement covered with a cross and had \"holy water.\"  Patient had dismantled gun.  Saul took gun and has it. Saul says patient showed him a journal \"with all kinds of messed up stuff in it.\"  Saul says patient told Saul that Hema has medical cannabis Rx.  Saul is caring for patient's two dogs.     Saul says their father is an addict who has been \"in and out of our lives.\"  Saul says he himself has been in hospital with Bipolar disorder, manic type in past.  Saul is recovering meth addict.  Saul adds their mother is in [place] and Saul [facility]     Saul asks about 72 hour hold as Saul is worried about patient's mental state.  Writer assures Saul that patient Hema is safe, that Hema will not discharge until it is determined Hema can do so safely.      Saul says if patient clears and is determined to be safe to discharge sometime during encounter, that Saul will come get his brother.  Brother will allow Hema to stay at his home.  Saul adds they lived together until 6 months ago.  Saul wonders if patient is lonely, says Hema was supposed to come to dinner tonight but Saul said he has \"sleep schedule\" he must maintain and would not come to dinner as planned.    "

## 2024-12-08 NOTE — ED NOTES
Writer received patient Hema's brother Saul's phone number from [facility] .  Left generic vm.  Vm identifies Saul.  Writer tried calling parents, listed in chart as contacts. Generic vms.    Writer unable to reach collaterals,

## 2024-12-08 NOTE — ED NOTES
Please see 0347 am ED note from collateral.  Patient brother Saul did find patient with firearm.  Saul has firearm.  Is willing to pick patient up if reassessment deems Hema is now safe for discharge.

## 2024-12-08 NOTE — ED NOTES
Per DEC recommendations Dr. Lee has updated patient that he is unsafe to discharge at this time. Patient is not in agreement with this. After explaining to patient reasoning for not being sha to safely discharge him at this time Dr. Lee placed patient on 72 hour hold. Rights given to patient. Patient still appears agitated, unwilling to remain in his room, argumentative with staff. Code 21 called. Physical hold done for less than 1 minute in order to administer IM meds safely. Immediately after physical hold initiated, patient is cooperative with med administration. Patient is now calm in room. Debrief done with patient and verbalizes understanding but endorses frustration for the situation. Staff continues to provide explanation and answer questions as appropriate. Provided with more food and comfort measures per patient request. 1:1 staff remains at bedside.    Violent/Self-Destructive Restraints    Restraint order was obtained YES  Date and time of initial order: 12/8 0239  The length of the order is: 4 hours  Time order expires: 4hrs    Reason for Restraints: danger to self.    Restraint type: physical hold  Supervisor notified that the restaints were started:YES Peg Bowie  Patient is Not applicable.   The time restraints were placed: 0239  Documentation started in Violent/Self-Destructive Doc Flowsheet at: 0239    Continued assessments and documenting will be done every 15 minutes per plan of care.   Care Plan CPG added: Yes      Reason for Restraint being discontinued: discontinuation criteria met  Time Restraint discontinued: 0240

## 2024-12-08 NOTE — PLAN OF CARE
"Hema Rico  December 8, 2024  Plan of Care Hand-off Note     Patient Care Path: observation    Plan for Care: Observation     Patient Hema brought to ED after Hema called EMS for medical.  EMS called PD when patient appeared to be in mental health crisis.  Patient in ED hyperverbal, tangential, nonsensical. Hema tells writer he does not want to talk to anyone except his therapist Holly from [place] who works at the [facility]  He says \"this is all childhood trauma.  No one understands this.\" He says he wants to leave, that he feels like a \"prisoner.\" He says he is afraid.  Asked what he is afraid of, he asks if writer can see \"orb\" above his head.  Writer tells Hema there is a bright glare on the wall above Hema's head from the fluorescent lights. He seems to calm somewhat momentarily.       He states at one point that he is withdrawing from Zoloft. He tells ED triage staff that he \"took an undisclosed drug that comes from the earth.\"  He mentions not wanting to be on any medications.  He talks of his \"celestrial ancestors,\" states he does not want to kill himself.  He says at one point, \"I had the rifle in my hand, the bullet in another. But I think it's something else besides the drugs.  My father is an addict.  I am trying not to be that.  I want to isolate.  The world does not understand. I want to live but I must isolate\"  He continues mentioning his therapist Holly.  He says several times, \"this is not the drugs.  I don't know about schizophrenia.  I don't want to take medications.      Hema has stated he trusts his brother, he gives his brother's name as Saul but does not give writer number.  Writer tells Hema we want to make sure someone he trusts knows he is here. Hema says he only wants his therapist contacted.  He offers to send writer a picture of therapist.  Writer finds a Holly Lawrence in [place] at [facility].  Not available after hours.  Writer is unsure if " this is Hema's therapist.  Left vm for patient's brother Saul.  Cannot reach other collaterals.      Saul is somewhat agitated at times in ED but able to redirect at this time.  Attending MD informed. Hema is refusing labs this encounter.  Writer finds no hx of substance induced psychosis, drug abuse or past psychotic episodes in patient chart.    Identified Goals and Safety Issues:   Reduce patient distress/agitation   2.   Stabilize Patient  3.   Patient safety as he mentions having had gun and bullet considering suicide at unspecified time    4.   Staff safety as patient intermittently agitated   5.   Observation to determine if patient clears to differmine differential dx due to substance induced psychosis or a psychotic disorder.   6.  Determine risk of patient self harm/suicide if stabilizes sufficiently.    7.  Patient collateral requested by DEC.    Overview:  Earline Prather  Mother  [phone]   Phillip Prather  Stepparent  [phone]  Brother Saul     Legal Status:  BHARGAV hold     Psychiatry Consult: yes, if available    Updated regarding plan of care.      Christine Centeno, ROCHELLESW

## 2024-12-08 NOTE — ED TRIAGE NOTES
"Brought in by Ariana CARRASCO. Pt c/o withdrawing from zoloft. PD put patient on hold because he made suicidal comments stating he had a gun and intended to kill himself. On arrival patient is hyper verbal, tangential speech, states he does not want to kill himself. States he would like to take a shower then leave. Has not been on any meds for \"awhile\". States he does see a therapist. Denies ETOH use, states he took an undisclosed drug that \"comes from the earth\".        "

## 2025-06-17 SDOH — HEALTH STABILITY: PHYSICAL HEALTH: ON AVERAGE, HOW MANY MINUTES DO YOU ENGAGE IN EXERCISE AT THIS LEVEL?: 80 MIN

## 2025-06-17 SDOH — HEALTH STABILITY: PHYSICAL HEALTH: ON AVERAGE, HOW MANY DAYS PER WEEK DO YOU ENGAGE IN MODERATE TO STRENUOUS EXERCISE (LIKE A BRISK WALK)?: 5 DAYS

## 2025-06-17 ASSESSMENT — ASTHMA QUESTIONNAIRES
QUESTION_3 LAST FOUR WEEKS HOW OFTEN DID YOUR ASTHMA SYMPTOMS (WHEEZING, COUGHING, SHORTNESS OF BREATH, CHEST TIGHTNESS OR PAIN) WAKE YOU UP AT NIGHT OR EARLIER THAN USUAL IN THE MORNING: ONCE OR TWICE
QUESTION_4 LAST FOUR WEEKS HOW OFTEN HAVE YOU USED YOUR RESCUE INHALER OR NEBULIZER MEDICATION (SUCH AS ALBUTEROL): ONCE A WEEK OR LESS
QUESTION_2 LAST FOUR WEEKS HOW OFTEN HAVE YOU HAD SHORTNESS OF BREATH: ONCE OR TWICE A WEEK
ACT_TOTALSCORE: 20
QUESTION_5 LAST FOUR WEEKS HOW WOULD YOU RATE YOUR ASTHMA CONTROL: WELL CONTROLLED
QUESTION_1 LAST FOUR WEEKS HOW MUCH OF THE TIME DID YOUR ASTHMA KEEP YOU FROM GETTING AS MUCH DONE AT WORK, SCHOOL OR AT HOME: A LITTLE OF THE TIME

## 2025-06-17 ASSESSMENT — SOCIAL DETERMINANTS OF HEALTH (SDOH): HOW OFTEN DO YOU GET TOGETHER WITH FRIENDS OR RELATIVES?: ONCE A WEEK

## 2025-06-18 ENCOUNTER — OFFICE VISIT (OUTPATIENT)
Dept: FAMILY MEDICINE | Facility: OTHER | Age: 25
End: 2025-06-18
Attending: FAMILY MEDICINE
Payer: COMMERCIAL

## 2025-06-18 VITALS
DIASTOLIC BLOOD PRESSURE: 70 MMHG | RESPIRATION RATE: 16 BRPM | WEIGHT: 229.1 LBS | SYSTOLIC BLOOD PRESSURE: 112 MMHG | BODY MASS INDEX: 27.05 KG/M2 | TEMPERATURE: 97.5 F | OXYGEN SATURATION: 99 % | HEIGHT: 77 IN | HEART RATE: 60 BPM

## 2025-06-18 DIAGNOSIS — Z13.6 ENCOUNTER FOR LIPID SCREENING FOR CARDIOVASCULAR DISEASE: ICD-10-CM

## 2025-06-18 DIAGNOSIS — J45.991 COUGH VARIANT ASTHMA: ICD-10-CM

## 2025-06-18 DIAGNOSIS — Z00.00 ENCOUNTER FOR PREVENTIVE CARE: Primary | ICD-10-CM

## 2025-06-18 DIAGNOSIS — F41.1 GAD (GENERALIZED ANXIETY DISORDER): Chronic | ICD-10-CM

## 2025-06-18 DIAGNOSIS — Z13.220 ENCOUNTER FOR LIPID SCREENING FOR CARDIOVASCULAR DISEASE: ICD-10-CM

## 2025-06-18 DIAGNOSIS — Z13.1 SCREENING FOR DIABETES MELLITUS: ICD-10-CM

## 2025-06-18 ASSESSMENT — ANXIETY QUESTIONNAIRES
GAD7 TOTAL SCORE: 7
1. FEELING NERVOUS, ANXIOUS, OR ON EDGE: SEVERAL DAYS
GAD7 TOTAL SCORE: 7
4. TROUBLE RELAXING: SEVERAL DAYS
2. NOT BEING ABLE TO STOP OR CONTROL WORRYING: SEVERAL DAYS
7. FEELING AFRAID AS IF SOMETHING AWFUL MIGHT HAPPEN: SEVERAL DAYS
6. BECOMING EASILY ANNOYED OR IRRITABLE: SEVERAL DAYS
3. WORRYING TOO MUCH ABOUT DIFFERENT THINGS: SEVERAL DAYS
IF YOU CHECKED OFF ANY PROBLEMS ON THIS QUESTIONNAIRE, HOW DIFFICULT HAVE THESE PROBLEMS MADE IT FOR YOU TO DO YOUR WORK, TAKE CARE OF THINGS AT HOME, OR GET ALONG WITH OTHER PEOPLE: NOT DIFFICULT AT ALL
5. BEING SO RESTLESS THAT IT IS HARD TO SIT STILL: SEVERAL DAYS

## 2025-06-18 ASSESSMENT — PAIN SCALES - GENERAL: PAINLEVEL_OUTOF10: NO PAIN (0)

## 2025-06-18 NOTE — PROGRESS NOTES
"Preventive Care Visit  [facility]  Juan Levine DO, Family Medicine  Jun 18, 2025      Assessment & Plan     Encounter for preventive care  Patient requesting testosterone level be checked.  Not having any symptoms of lows.  Discussed his insurance may not cover this and he could get a bill he'd be responsible for.  - Testosterone, total; Future    JENAE (generalized anxiety disorder)  - Adult Mental Health  Referral; Future    Cough variant asthma  - CBC with Platelets & Differential; Future    Encounter for lipid screening for cardiovascular disease  - Lipid Profile; Future    Screening for diabetes mellitus  - Comprehensive metabolic panel; Future            Counseling  Appropriate preventive services were addressed with this patient via screening, questionnaire, or discussion as appropriate for fall prevention, nutrition, physical activity, Tobacco-use cessation, social engagement, weight loss and cognition.  Checklist reviewing preventive services available has been given to the patient.            Dulce Garrido is a 25 year old, presenting for the following:  Physical        6/18/2025     9:17 AM   Additional Questions   Roomed by Hernán lpn   Accompanied by self          HPI      Having some anxiety coming back - world events not helping - stopped Zoloft as felt numb, \"not alive\", and some sexual side effects      Asthma - currently on Trelegy - has seen Pulm - rescue inhaler maybe once a month if that  Reflux - meds didin't help - changed diet    He is aware insurance may not cover testosterone which he is requesting be checked          Advance Care Planning    Discussed advance care planning with patient; however, patient declined at this time.        6/17/2025   General Health   How would you rate your overall physical health? Excellent   Feel stress (tense, anxious, or unable to sleep) Only a little   (!) STRESS CONCERN      6/17/2025   Nutrition   Three or more servings of " calcium each day? Yes   Diet: Regular (no restrictions)   How many servings of fruit and vegetables per day? (!) 2-3   How many sweetened beverages each day? 0-1         6/17/2025   Exercise   Days per week of moderate/strenous exercise 5 days   Average minutes spent exercising at this level 80 min     5 days per week weight lifting, punching bag, walking dogs for an hour        6/17/2025   Social Factors   Frequency of gathering with friends or relatives Once a week   Worry food won't last until get money to buy more No   Food not last or not have enough money for food? No   Do you have housing? (Housing is defined as stable permanent housing and does not include staying outside in a car, in a tent, in an abandoned building, in an overnight shelter, or couch-surfing.) Yes   Are you worried about losing your housing? No   Lack of transportation? No   Unable to get utilities (heat,electricity)? No         6/17/2025   Dental   Dentist two times every year? Yes     Dentist 2 weeks ago  Eyes due        Today's PHQ-9 Score:       6/17/2025    11:03 AM   PHQ-9 SCORE   PHQ-9 Total Score MyChart 0   PHQ-9 Total Score 0        Patient-reported         6/17/2025   Substance Use   Alcohol more than 3/day or more than 7/wk Yes   How often do you have a drink containing alcohol 2 to 4 times a month   How many alcohol drinks on typical day 3 or 4   How often do you have 5+ drinks at one occasion Less than monthly   Audit 2/3 Score 2   How often not able to stop drinking once started Never   How often failed to do what normally expected Never   How often needed first drink in am after a heavy drinking session Never   How often feeling of guilt or remorse after drinking Less than monthly   How often unable to remember what happened the night before Less than monthly   Have you or someone else been injured because of your drinking No   Has anyone been concerned or suggested you cut down on drinking No   TOTAL SCORE - AUDIT 6   Do  "you use any other substances recreationally? (!) CANNABIS PRODUCTS     Social History     Tobacco Use    Smoking status: Former     Types: Dip, chew, snus or snuff, Other     Quit date: 8/1/2021     Years since quitting: 3.8     Passive exposure: Past    Smokeless tobacco: Former     Types: Chew     Quit date: 8/28/2020   Vaping Use    Vaping status: Never Used   Substance Use Topics    Alcohol use: Yes     Comment: occ    Drug use: No           6/17/2025   STI Screening   New sexual partner(s) since last STI/HIV test? No         6/17/2025   Contraception/Family Planning   Questions about contraception or family planning No        Reviewed and updated as needed this visit by Provider   Tobacco  Allergies    Med Hx  Surg Hx  Fam Hx                  Review of Systems  Constitutional, HEENT, cardiovascular, pulmonary, GI, , musculoskeletal, neuro, skin, endocrine and psych systems are negative, except as otherwise noted.     Objective    Exam  /70 (BP Location: Left arm, Patient Position: Sitting, Cuff Size: Adult Regular)   Pulse 60   Temp 97.5  F (36.4  C) (Tympanic)   Resp 16   Ht 1.956 m (6' 5\")   Wt 103.9 kg (229 lb 1.6 oz)   SpO2 99%   BMI 27.17 kg/m     Estimated body mass index is 27.17 kg/m  as calculated from the following:    Height as of this encounter: 1.956 m (6' 5\").    Weight as of this encounter: 103.9 kg (229 lb 1.6 oz).    Physical Exam  Constitutional:       General: He is not in acute distress.     Appearance: Normal appearance.   HENT:      Head: Normocephalic and atraumatic.      Right Ear: Tympanic membrane normal.      Left Ear: Tympanic membrane normal.      Mouth/Throat:      Mouth: Mucous membranes are moist.      Pharynx: Oropharynx is clear.   Eyes:      Conjunctiva/sclera: Conjunctivae normal.      Pupils: Pupils are equal, round, and reactive to light.   Neck:      Vascular: No carotid bruit.   Cardiovascular:      Rate and Rhythm: Normal rate and regular rhythm.      " Heart sounds: Normal heart sounds. No murmur heard.  Pulmonary:      Effort: Pulmonary effort is normal.      Breath sounds: Normal breath sounds.   Abdominal:      General: Bowel sounds are normal.      Palpations: Abdomen is soft.   Musculoskeletal:         General: Normal range of motion.      Right lower leg: No edema.      Left lower leg: No edema.   Lymphadenopathy:      Cervical: No cervical adenopathy.   Skin:     General: Skin is warm and dry.      Coloration: Skin is not jaundiced.   Neurological:      General: No focal deficit present.      Mental Status: He is alert and oriented to person, place, and time.      Deep Tendon Reflexes: Reflexes normal.               Signed Electronically by: Juan Levine DO      Answers submitted by the patient for this visit:  Patient Health Questionnaire (Submitted on 6/17/2025)  If you checked off any problems, how difficult have these problems made it for you to do your work, take care of things at home, or get along with other people?: Not difficult at all  PHQ9 TOTAL SCORE: 0

## 2025-06-19 ENCOUNTER — RESULTS FOLLOW-UP (OUTPATIENT)
Dept: FAMILY MEDICINE | Facility: OTHER | Age: 25
End: 2025-06-19

## 2025-06-19 ENCOUNTER — LAB (OUTPATIENT)
Dept: LAB | Facility: OTHER | Age: 25
End: 2025-06-19
Payer: COMMERCIAL

## 2025-06-19 DIAGNOSIS — Z00.00 ENCOUNTER FOR PREVENTIVE CARE: ICD-10-CM

## 2025-06-19 DIAGNOSIS — J45.991 COUGH VARIANT ASTHMA: ICD-10-CM

## 2025-06-19 DIAGNOSIS — Z13.220 ENCOUNTER FOR LIPID SCREENING FOR CARDIOVASCULAR DISEASE: ICD-10-CM

## 2025-06-19 DIAGNOSIS — Z13.6 ENCOUNTER FOR LIPID SCREENING FOR CARDIOVASCULAR DISEASE: ICD-10-CM

## 2025-06-19 DIAGNOSIS — Z13.1 SCREENING FOR DIABETES MELLITUS: ICD-10-CM

## 2025-06-19 LAB
ALBUMIN SERPL BCG-MCNC: 4.5 G/DL (ref 3.5–5.2)
ALP SERPL-CCNC: 108 U/L (ref 40–150)
ALT SERPL W P-5'-P-CCNC: 22 U/L (ref 0–70)
ANION GAP SERPL CALCULATED.3IONS-SCNC: 12 MMOL/L (ref 7–15)
AST SERPL W P-5'-P-CCNC: 20 U/L (ref 0–45)
BASOPHILS # BLD AUTO: 0.1 10E3/UL (ref 0–0.2)
BASOPHILS NFR BLD AUTO: 2 %
BILIRUB SERPL-MCNC: 1 MG/DL
BUN SERPL-MCNC: 18.1 MG/DL (ref 6–20)
CALCIUM SERPL-MCNC: 9.3 MG/DL (ref 8.8–10.4)
CHLORIDE SERPL-SCNC: 105 MMOL/L (ref 98–107)
CHOLEST SERPL-MCNC: 142 MG/DL
CREAT SERPL-MCNC: 0.96 MG/DL (ref 0.67–1.17)
EGFRCR SERPLBLD CKD-EPI 2021: >90 ML/MIN/1.73M2
EOSINOPHIL # BLD AUTO: 0.7 10E3/UL (ref 0–0.7)
EOSINOPHIL NFR BLD AUTO: 12 %
ERYTHROCYTE [DISTWIDTH] IN BLOOD BY AUTOMATED COUNT: 11.9 % (ref 10–15)
FASTING STATUS PATIENT QL REPORTED: YES
FASTING STATUS PATIENT QL REPORTED: YES
GLUCOSE SERPL-MCNC: 96 MG/DL (ref 70–99)
HCO3 SERPL-SCNC: 25 MMOL/L (ref 22–29)
HCT VFR BLD AUTO: 42.9 % (ref 40–53)
HDLC SERPL-MCNC: 42 MG/DL
HGB BLD-MCNC: 14.5 G/DL (ref 13.3–17.7)
IMM GRANULOCYTES # BLD: 0 10E3/UL
IMM GRANULOCYTES NFR BLD: 0 %
LDLC SERPL CALC-MCNC: 73 MG/DL
LYMPHOCYTES # BLD AUTO: 2 10E3/UL (ref 0.8–5.3)
LYMPHOCYTES NFR BLD AUTO: 35 %
MCH RBC QN AUTO: 30 PG (ref 26.5–33)
MCHC RBC AUTO-ENTMCNC: 33.8 G/DL (ref 31.5–36.5)
MCV RBC AUTO: 89 FL (ref 78–100)
MONOCYTES # BLD AUTO: 0.4 10E3/UL (ref 0–1.3)
MONOCYTES NFR BLD AUTO: 6 %
NEUTROPHILS # BLD AUTO: 2.6 10E3/UL (ref 1.6–8.3)
NEUTROPHILS NFR BLD AUTO: 45 %
NONHDLC SERPL-MCNC: 100 MG/DL
NRBC # BLD AUTO: 0 10E3/UL
NRBC BLD AUTO-RTO: 0 /100
PLATELET # BLD AUTO: 160 10E3/UL (ref 150–450)
POTASSIUM SERPL-SCNC: 4.3 MMOL/L (ref 3.4–5.3)
PROT SERPL-MCNC: 7.1 G/DL (ref 6.4–8.3)
RBC # BLD AUTO: 4.83 10E6/UL (ref 4.4–5.9)
SODIUM SERPL-SCNC: 142 MMOL/L (ref 135–145)
TRIGL SERPL-MCNC: 135 MG/DL
WBC # BLD AUTO: 5.8 10E3/UL (ref 4–11)

## 2025-06-19 PROCEDURE — 3048F LDL-C <100 MG/DL: CPT

## 2025-06-19 PROCEDURE — 80053 COMPREHEN METABOLIC PANEL: CPT

## 2025-06-19 PROCEDURE — 36415 COLL VENOUS BLD VENIPUNCTURE: CPT

## 2025-06-19 PROCEDURE — 80061 LIPID PANEL: CPT

## 2025-06-19 PROCEDURE — 85025 COMPLETE CBC W/AUTO DIFF WBC: CPT

## 2025-06-19 PROCEDURE — 84403 ASSAY OF TOTAL TESTOSTERONE: CPT

## 2025-06-22 LAB — TESTOST SERPL-MCNC: 428 NG/DL (ref 240–950)

## (undated) DEVICE — COBLATION WAND-TURBINATE REDUCT. PTR

## (undated) DEVICE — BLANKET-BAIR LOWER EXTREMITY

## (undated) DEVICE — PACK-ENT-CUSTOM

## (undated) DEVICE — TUBING-IRRIG. SYSTEM CLEARVISION

## (undated) DEVICE — INSTRUMENT WIPE-VISIWIPE

## (undated) DEVICE — IRRIGATION-H2O 1000ML

## (undated) DEVICE — SOL-NACL 0.9% 1000ML

## (undated) DEVICE — RELIEVA SPINPLUS BALLOON SYSTEM

## (undated) DEVICE — SPONGE-NEURO PATTY 1/2" X 1" X-RAY DETECTABLE

## (undated) DEVICE — INFLATION DEVICE-SINUS BALLOON CATH

## (undated) DEVICE — BLADE-SCALPEL #15

## (undated) DEVICE — BLADE-FUSION ROTATABLE QUADCUT 4.3MM X 13CM

## (undated) DEVICE — TRACKER-INSTRUMENT ENT NAV

## (undated) DEVICE — PACK-BASIN SET-UP

## (undated) DEVICE — ADAPTER-SPECIMEN TRAP

## (undated) DEVICE — BIN-ENT BIN

## (undated) DEVICE — IRRIGATION-NACL 1000ML

## (undated) DEVICE — NASOPORE 4CM FIRM

## (undated) DEVICE — CAUTERY PAD-POLYHESIVE II ADULT

## (undated) DEVICE — TRACKER-PATIENT ENT NIPT

## (undated) DEVICE — LIGHT HANDLE COVER

## (undated) DEVICE — Device

## (undated) DEVICE — LABEL-STERILE PREPRINTED FOR OR

## (undated) DEVICE — CANISTER-SUCTION 2000CC

## (undated) DEVICE — CAUTERY PENCIL-W/SUCTION 8FR HANDSWITCHING

## (undated) DEVICE — SUTURE-CHROMIC GUT 4-0 RB-1 U203H

## (undated) DEVICE — SUTURE-SILK 0 SH K834H

## (undated) DEVICE — SCD SLEEVE-KNEE REG.

## (undated) DEVICE — GLV-6.5 PROTEXIS PI CLASSIC LF/PF

## (undated) DEVICE — SUTURE-VICRYL 4-0 P-3 J494G

## (undated) DEVICE — TUBING-IRRIGATOR STRAIGHTSHOT FUSION

## (undated) RX ORDER — LIDOCAINE HYDROCHLORIDE 20 MG/ML
INJECTION, SOLUTION EPIDURAL; INFILTRATION; INTRACAUDAL; PERINEURAL
Status: DISPENSED
Start: 2020-08-26

## (undated) RX ORDER — ALBUTEROL SULFATE 90 UG/1
AEROSOL, METERED RESPIRATORY (INHALATION)
Status: DISPENSED
Start: 2020-08-26

## (undated) RX ORDER — FENTANYL CITRATE 50 UG/ML
INJECTION, SOLUTION INTRAMUSCULAR; INTRAVENOUS
Status: DISPENSED
Start: 2020-08-26

## (undated) RX ORDER — PROPOFOL 10 MG/ML
INJECTION, EMULSION INTRAVENOUS
Status: DISPENSED
Start: 2020-08-26

## (undated) RX ORDER — DEXAMETHASONE SODIUM PHOSPHATE 10 MG/ML
INJECTION, SOLUTION INTRAMUSCULAR; INTRAVENOUS
Status: DISPENSED
Start: 2020-08-26

## (undated) RX ORDER — ONDANSETRON 2 MG/ML
INJECTION INTRAMUSCULAR; INTRAVENOUS
Status: DISPENSED
Start: 2020-08-26